# Patient Record
Sex: MALE | Race: WHITE | Employment: UNEMPLOYED | URBAN - METROPOLITAN AREA
[De-identification: names, ages, dates, MRNs, and addresses within clinical notes are randomized per-mention and may not be internally consistent; named-entity substitution may affect disease eponyms.]

---

## 2017-09-24 ENCOUNTER — APPOINTMENT (OUTPATIENT)
Dept: GENERAL RADIOLOGY | Age: 22
End: 2017-09-24
Payer: MEDICARE

## 2017-09-24 ENCOUNTER — HOSPITAL ENCOUNTER (EMERGENCY)
Age: 22
Discharge: HOME | End: 2017-09-24
Attending: EMERGENCY MEDICINE
Payer: MEDICARE

## 2017-09-24 VITALS
TEMPERATURE: 98.1 F | RESPIRATION RATE: 16 BRPM | HEIGHT: 70 IN | HEART RATE: 104 BPM | OXYGEN SATURATION: 97 % | DIASTOLIC BLOOD PRESSURE: 73 MMHG | WEIGHT: 230 LBS | BODY MASS INDEX: 32.93 KG/M2 | SYSTOLIC BLOOD PRESSURE: 127 MMHG
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
## 2017-09-24 PROCEDURE — 6370000000 HC RX 637 (ALT 250 FOR IP): Performed by: EMERGENCY MEDICINE

## 2017-09-24 PROCEDURE — 99284 EMERGENCY DEPT VISIT MOD MDM: CPT

## 2017-09-24 PROCEDURE — 72040 X-RAY EXAM NECK SPINE 2-3 VW: CPT

## 2017-09-24 PROCEDURE — 72100 X-RAY EXAM L-S SPINE 2/3 VWS: CPT

## 2017-09-24 PROCEDURE — 72072 X-RAY EXAM THORAC SPINE 3VWS: CPT

## 2017-09-24 RX ORDER — IBUPROFEN 600 MG/1
600 TABLET ORAL ONCE
Status: COMPLETED | OUTPATIENT
Start: 2017-09-24 | End: 2017-09-24

## 2017-09-24 RX ADMIN — IBUPROFEN 600 MG: 600 TABLET, FILM COATED ORAL at 03:34

## 2017-09-24 ASSESSMENT — PAIN SCALES - GENERAL
PAINLEVEL_OUTOF10: 7
PAINLEVEL_OUTOF10: 8

## 2017-09-24 ASSESSMENT — PAIN DESCRIPTION - DESCRIPTORS: DESCRIPTORS: ACHING

## 2017-09-24 ASSESSMENT — ENCOUNTER SYMPTOMS
SHORTNESS OF BREATH: 0
RESPIRATORY NEGATIVE: 1
ABDOMINAL PAIN: 0
COUGH: 0
EYES NEGATIVE: 1
BACK PAIN: 1
GASTROINTESTINAL NEGATIVE: 1

## 2017-09-24 ASSESSMENT — PAIN DESCRIPTION - FREQUENCY: FREQUENCY: CONTINUOUS

## 2017-09-24 ASSESSMENT — PAIN DESCRIPTION - LOCATION: LOCATION: BACK;HEAD

## 2017-10-31 ENCOUNTER — HOSPITAL ENCOUNTER (OUTPATIENT)
Age: 22
Discharge: HOME | End: 2017-10-31
Payer: MEDICARE

## 2017-10-31 LAB
ABSOLUTE EOS #: 0.2 K/UL (ref 0–0.44)
ABSOLUTE IMMATURE GRANULOCYTE: <0.03 K/UL (ref 0–0.3)
ABSOLUTE LYMPH #: 1.7 K/UL (ref 1.1–3.7)
ABSOLUTE MONO #: 0.67 K/UL (ref 0.1–1.2)
ALBUMIN SERPL-MCNC: 4.9 G/DL (ref 3.5–5.2)
ALBUMIN/GLOBULIN RATIO: 1.4 (ref 1–2.5)
ALP BLD-CCNC: 69 U/L (ref 40–129)
ALT SERPL-CCNC: 38 U/L (ref 5–41)
ANION GAP SERPL CALCULATED.3IONS-SCNC: 14 MMOL/L (ref 9–17)
AST SERPL-CCNC: 25 U/L
BASOPHILS # BLD: 1 % (ref 0–2)
BASOPHILS ABSOLUTE: 0.05 K/UL (ref 0–0.2)
BILIRUB SERPL-MCNC: 0.45 MG/DL (ref 0.3–1.2)
BUN BLDV-MCNC: 11 MG/DL (ref 6–20)
BUN/CREAT BLD: ABNORMAL (ref 9–20)
CALCIUM SERPL-MCNC: 9.9 MG/DL (ref 8.6–10.4)
CHLORIDE BLD-SCNC: 102 MMOL/L (ref 98–107)
CHOLESTEROL/HDL RATIO: 4.2
CHOLESTEROL: 192 MG/DL
CO2: 26 MMOL/L (ref 20–31)
CREAT SERPL-MCNC: 0.74 MG/DL (ref 0.7–1.2)
DIFFERENTIAL TYPE: ABNORMAL
EOSINOPHILS RELATIVE PERCENT: 2 % (ref 1–4)
GFR AFRICAN AMERICAN: >60 ML/MIN
GFR NON-AFRICAN AMERICAN: >60 ML/MIN
GFR SERPL CREATININE-BSD FRML MDRD: ABNORMAL ML/MIN/{1.73_M2}
GFR SERPL CREATININE-BSD FRML MDRD: ABNORMAL ML/MIN/{1.73_M2}
GLUCOSE BLD-MCNC: 102 MG/DL (ref 70–99)
HAV IGM SER IA-ACNC: NONREACTIVE
HCT VFR BLD CALC: 50.6 % (ref 40.7–50.3)
HDLC SERPL-MCNC: 46 MG/DL
HEMOGLOBIN: 16.3 G/DL (ref 13–17)
HEPATITIS B CORE IGM ANTIBODY: NONREACTIVE
HEPATITIS B SURFACE ANTIGEN: NONREACTIVE
HEPATITIS C ANTIBODY: REACTIVE
HIV AG/AB: NONREACTIVE
IMMATURE GRANULOCYTES: 0 %
LDL CHOLESTEROL: 116 MG/DL (ref 0–130)
LYMPHOCYTES # BLD: 20 % (ref 24–43)
MCH RBC QN AUTO: 29.4 PG (ref 25.2–33.5)
MCHC RBC AUTO-ENTMCNC: 32.2 G/DL (ref 29.9–34.7)
MCV RBC AUTO: 91.2 FL (ref 82.6–102.9)
MONOCYTES # BLD: 8 % (ref 3–12)
PDW BLD-RTO: 13.3 % (ref 11.8–14.4)
PLATELET # BLD: 216 K/UL (ref 138–453)
PLATELET ESTIMATE: ABNORMAL
PMV BLD AUTO: 10.3 FL (ref 8.1–13.5)
POTASSIUM SERPL-SCNC: 4.5 MMOL/L (ref 3.7–5.3)
RBC # BLD: 5.55 M/UL (ref 4.21–5.77)
RBC # BLD: ABNORMAL 10*6/UL
SEG NEUTROPHILS: 69 % (ref 36–65)
SEGMENTED NEUTROPHILS ABSOLUTE COUNT: 5.92 K/UL (ref 1.5–8.1)
SODIUM BLD-SCNC: 142 MMOL/L (ref 135–144)
T3 FREE: 2.94 PG/ML (ref 2.02–4.43)
THYROXINE, FREE: 0.99 NG/DL (ref 0.93–1.7)
TOTAL PROTEIN: 8.3 G/DL (ref 6.4–8.3)
TRIGL SERPL-MCNC: 148 MG/DL
TSH SERPL DL<=0.05 MIU/L-ACNC: 0.59 MIU/L (ref 0.3–5)
VLDLC SERPL CALC-MCNC: NORMAL MG/DL (ref 1–30)
WBC # BLD: 8.6 K/UL (ref 3.5–11.3)
WBC # BLD: ABNORMAL 10*3/UL

## 2017-10-31 PROCEDURE — 36415 COLL VENOUS BLD VENIPUNCTURE: CPT

## 2017-10-31 PROCEDURE — 87389 HIV-1 AG W/HIV-1&-2 AB AG IA: CPT

## 2017-10-31 PROCEDURE — 80061 LIPID PANEL: CPT

## 2017-10-31 PROCEDURE — 84481 FREE ASSAY (FT-3): CPT

## 2017-10-31 PROCEDURE — 84439 ASSAY OF FREE THYROXINE: CPT

## 2017-10-31 PROCEDURE — 80053 COMPREHEN METABOLIC PANEL: CPT

## 2017-10-31 PROCEDURE — 85025 COMPLETE CBC W/AUTO DIFF WBC: CPT

## 2017-10-31 PROCEDURE — 84443 ASSAY THYROID STIM HORMONE: CPT

## 2017-10-31 PROCEDURE — 80074 ACUTE HEPATITIS PANEL: CPT

## 2018-02-16 ENCOUNTER — HOSPITAL ENCOUNTER (OUTPATIENT)
Age: 23
Setting detail: SPECIMEN
Discharge: HOME | End: 2018-02-16
Payer: MEDICARE

## 2018-02-16 LAB
ABSOLUTE EOS #: 0.21 K/UL (ref 0–0.44)
ABSOLUTE IMMATURE GRANULOCYTE: <0.03 K/UL (ref 0–0.3)
ABSOLUTE LYMPH #: 1.32 K/UL (ref 1.1–3.7)
ABSOLUTE MONO #: 0.59 K/UL (ref 0.1–1.2)
ALBUMIN SERPL-MCNC: 4.4 G/DL (ref 3.5–5.2)
ALBUMIN/GLOBULIN RATIO: 1.7 (ref 1–2.5)
ALP BLD-CCNC: 67 U/L (ref 40–129)
ALT SERPL-CCNC: 16 U/L (ref 5–41)
ANION GAP SERPL CALCULATED.3IONS-SCNC: 12 MMOL/L (ref 9–17)
AST SERPL-CCNC: 19 U/L
BASOPHILS # BLD: 1 % (ref 0–2)
BASOPHILS ABSOLUTE: 0.05 K/UL (ref 0–0.2)
BILIRUB SERPL-MCNC: 0.24 MG/DL (ref 0.3–1.2)
BILIRUBIN DIRECT: <0.08 MG/DL
BILIRUBIN, INDIRECT: ABNORMAL MG/DL (ref 0–1)
BUN BLDV-MCNC: 12 MG/DL (ref 6–20)
CALCIUM SERPL-MCNC: 9.5 MG/DL (ref 8.6–10.4)
CHLORIDE BLD-SCNC: 102 MMOL/L (ref 98–107)
CHOLESTEROL/HDL RATIO: 3.4
CHOLESTEROL: 136 MG/DL
CO2: 26 MMOL/L (ref 20–31)
CREAT SERPL-MCNC: 0.82 MG/DL (ref 0.7–1.2)
DIFFERENTIAL TYPE: ABNORMAL
EOSINOPHILS RELATIVE PERCENT: 3 % (ref 1–4)
GFR AFRICAN AMERICAN: >60 ML/MIN
GFR NON-AFRICAN AMERICAN: >60 ML/MIN
GFR SERPL CREATININE-BSD FRML MDRD: ABNORMAL ML/MIN/{1.73_M2}
GFR SERPL CREATININE-BSD FRML MDRD: ABNORMAL ML/MIN/{1.73_M2}
GLUCOSE BLD-MCNC: 74 MG/DL (ref 70–99)
HAV IGM SER IA-ACNC: NONREACTIVE
HCT VFR BLD CALC: 44.7 % (ref 40.7–50.3)
HDLC SERPL-MCNC: 40 MG/DL
HEMOGLOBIN: 14 G/DL (ref 13–17)
HEPATITIS B CORE IGM ANTIBODY: NONREACTIVE
HEPATITIS B SURFACE ANTIGEN: NONREACTIVE
HEPATITIS C ANTIBODY: REACTIVE
HIV AG/AB: NONREACTIVE
IMMATURE GRANULOCYTES: 0 %
LDL CHOLESTEROL: 80 MG/DL (ref 0–130)
LYMPHOCYTES # BLD: 18 % (ref 24–43)
MCH RBC QN AUTO: 28.5 PG (ref 25.2–33.5)
MCHC RBC AUTO-ENTMCNC: 31.3 G/DL (ref 28.4–34.8)
MCV RBC AUTO: 91 FL (ref 82.6–102.9)
MONOCYTES # BLD: 8 % (ref 3–12)
NRBC AUTOMATED: 0 PER 100 WBC
PDW BLD-RTO: 14.4 % (ref 11.8–14.4)
PLATELET # BLD: 222 K/UL (ref 138–453)
PLATELET ESTIMATE: ABNORMAL
PMV BLD AUTO: 11 FL (ref 8.1–13.5)
POTASSIUM SERPL-SCNC: 5.5 MMOL/L (ref 3.7–5.3)
RBC # BLD: 4.91 M/UL (ref 4.21–5.77)
RBC # BLD: ABNORMAL 10*6/UL
SEG NEUTROPHILS: 70 % (ref 36–65)
SEGMENTED NEUTROPHILS ABSOLUTE COUNT: 5.36 K/UL (ref 1.5–8.1)
SODIUM BLD-SCNC: 140 MMOL/L (ref 135–144)
T3 FREE: 3.82 PG/ML (ref 2.02–4.43)
THYROXINE, FREE: 1.06 NG/DL (ref 0.93–1.7)
TOTAL PROTEIN: 7 G/DL (ref 6.4–8.3)
TRIGL SERPL-MCNC: 82 MG/DL
TSH SERPL DL<=0.05 MIU/L-ACNC: 0.52 MIU/L (ref 0.3–5)
VLDLC SERPL CALC-MCNC: ABNORMAL MG/DL (ref 1–30)
WBC # BLD: 7.5 K/UL (ref 3.5–11.3)
WBC # BLD: ABNORMAL 10*3/UL

## 2018-02-19 LAB
QUANTIFERON (R) TB GOLD (INCUBATED): NEGATIVE
QUANTIFERON MITOGEN: 8.55 IU/ML
QUANTIFERON NIL: 0.06 IU/ML
QUANTIFERON TB AG MINUS NIL: 0.12 IU/ML (ref 0–0.34)

## 2018-03-01 ENCOUNTER — HOSPITAL ENCOUNTER (OUTPATIENT)
Age: 23
Setting detail: SPECIMEN
Discharge: HOME | End: 2018-03-01
Payer: MEDICARE

## 2018-03-02 LAB
DIRECT EXAM: NORMAL
Lab: NORMAL
SPECIMEN DESCRIPTION: NORMAL
STATUS: NORMAL

## 2018-05-17 ENCOUNTER — HOSPITAL ENCOUNTER (OUTPATIENT)
Age: 23
Setting detail: SPECIMEN
Discharge: HOME | End: 2018-05-17
Payer: MEDICARE

## 2018-05-17 LAB
ABSOLUTE EOS #: 0.3 K/UL (ref 0–0.44)
ABSOLUTE IMMATURE GRANULOCYTE: <0.03 K/UL (ref 0–0.3)
ABSOLUTE LYMPH #: 2.35 K/UL (ref 1.1–3.7)
ABSOLUTE MONO #: 0.49 K/UL (ref 0.1–1.2)
ALBUMIN SERPL-MCNC: 4 G/DL (ref 3.5–5.2)
ALBUMIN/GLOBULIN RATIO: 1.4 (ref 1–2.5)
ALP BLD-CCNC: 56 U/L (ref 40–129)
ALT SERPL-CCNC: 46 U/L (ref 5–41)
ANION GAP SERPL CALCULATED.3IONS-SCNC: 12 MMOL/L (ref 9–17)
AST SERPL-CCNC: 43 U/L
BASOPHILS # BLD: 1 % (ref 0–2)
BASOPHILS ABSOLUTE: 0.03 K/UL (ref 0–0.2)
BILIRUB SERPL-MCNC: 0.6 MG/DL (ref 0.3–1.2)
BILIRUBIN DIRECT: 0.2 MG/DL
BILIRUBIN, INDIRECT: 0.4 MG/DL (ref 0–1)
BUN BLDV-MCNC: 10 MG/DL (ref 6–20)
CALCIUM SERPL-MCNC: 8.8 MG/DL (ref 8.6–10.4)
CHLORIDE BLD-SCNC: 104 MMOL/L (ref 98–107)
CHOLESTEROL/HDL RATIO: 3.6
CHOLESTEROL: 105 MG/DL
CO2: 25 MMOL/L (ref 20–31)
CREAT SERPL-MCNC: 0.91 MG/DL (ref 0.7–1.2)
DIFFERENTIAL TYPE: ABNORMAL
EOSINOPHILS RELATIVE PERCENT: 5 % (ref 1–4)
GFR AFRICAN AMERICAN: >60 ML/MIN
GFR NON-AFRICAN AMERICAN: >60 ML/MIN
GFR SERPL CREATININE-BSD FRML MDRD: ABNORMAL ML/MIN/{1.73_M2}
GFR SERPL CREATININE-BSD FRML MDRD: ABNORMAL ML/MIN/{1.73_M2}
GLUCOSE BLD-MCNC: 86 MG/DL (ref 70–99)
HAV IGM SER IA-ACNC: NONREACTIVE
HCT VFR BLD CALC: 42.1 % (ref 40.7–50.3)
HDLC SERPL-MCNC: 29 MG/DL
HEMOGLOBIN: 13.6 G/DL (ref 13–17)
HEPATITIS B CORE IGM ANTIBODY: NONREACTIVE
HEPATITIS B SURFACE ANTIGEN: NONREACTIVE
HEPATITIS C ANTIBODY: REACTIVE
HIV AG/AB: NONREACTIVE
IMMATURE GRANULOCYTES: 0 %
LDL CHOLESTEROL: 60 MG/DL (ref 0–130)
LYMPHOCYTES # BLD: 41 % (ref 24–43)
MCH RBC QN AUTO: 28.5 PG (ref 25.2–33.5)
MCHC RBC AUTO-ENTMCNC: 32.3 G/DL (ref 28.4–34.8)
MCV RBC AUTO: 88.1 FL (ref 82.6–102.9)
MONOCYTES # BLD: 9 % (ref 3–12)
NRBC AUTOMATED: 0 PER 100 WBC
PDW BLD-RTO: 13.8 % (ref 11.8–14.4)
PLATELET # BLD: 191 K/UL (ref 138–453)
PLATELET ESTIMATE: ABNORMAL
PMV BLD AUTO: 10.6 FL (ref 8.1–13.5)
POTASSIUM SERPL-SCNC: 3.9 MMOL/L (ref 3.7–5.3)
RBC # BLD: 4.78 M/UL (ref 4.21–5.77)
RBC # BLD: ABNORMAL 10*6/UL
SEG NEUTROPHILS: 44 % (ref 36–65)
SEGMENTED NEUTROPHILS ABSOLUTE COUNT: 2.51 K/UL (ref 1.5–8.1)
SODIUM BLD-SCNC: 141 MMOL/L (ref 135–144)
T3 FREE: 4.05 PG/ML (ref 2.02–4.43)
THYROXINE, FREE: 1.33 NG/DL (ref 0.93–1.7)
TOTAL PROTEIN: 6.8 G/DL (ref 6.4–8.3)
TRIGL SERPL-MCNC: 82 MG/DL
TSH SERPL DL<=0.05 MIU/L-ACNC: 0.39 MIU/L (ref 0.3–5)
VLDLC SERPL CALC-MCNC: ABNORMAL MG/DL (ref 1–30)
WBC # BLD: 5.7 K/UL (ref 3.5–11.3)
WBC # BLD: ABNORMAL 10*3/UL

## 2018-05-19 LAB
QUANTIFERON (R) TB GOLD (INCUBATED): NEGATIVE
QUANTIFERON MITOGEN: >10 IU/ML
QUANTIFERON NIL: 0.02 IU/ML
QUANTIFERON TB AG MINUS NIL: 0 IU/ML (ref 0–0.34)

## 2018-06-05 ENCOUNTER — HOSPITAL ENCOUNTER (EMERGENCY)
Age: 23
Discharge: HOME | End: 2018-06-05
Attending: EMERGENCY MEDICINE
Payer: MEDICARE

## 2018-06-05 VITALS
BODY MASS INDEX: 32.93 KG/M2 | HEART RATE: 98 BPM | OXYGEN SATURATION: 98 % | SYSTOLIC BLOOD PRESSURE: 116 MMHG | TEMPERATURE: 98.6 F | WEIGHT: 230 LBS | RESPIRATION RATE: 11 BRPM | DIASTOLIC BLOOD PRESSURE: 69 MMHG | HEIGHT: 70 IN

## 2018-06-05 DIAGNOSIS — T40.1X1A ACCIDENTAL OVERDOSE OF HEROIN, INITIAL ENCOUNTER (HCC): Primary | ICD-10-CM

## 2018-06-05 LAB
EKG ATRIAL RATE: 115 BPM
EKG P AXIS: 37 DEGREES
EKG P-R INTERVAL: 146 MS
EKG Q-T INTERVAL: 320 MS
EKG QRS DURATION: 90 MS
EKG QTC CALCULATION (BAZETT): 442 MS
EKG R AXIS: 51 DEGREES
EKG T AXIS: 43 DEGREES
EKG VENTRICULAR RATE: 115 BPM

## 2018-06-05 PROCEDURE — 99283 EMERGENCY DEPT VISIT LOW MDM: CPT

## 2018-06-05 PROCEDURE — 96374 THER/PROPH/DIAG INJ IV PUSH: CPT

## 2018-06-05 PROCEDURE — 6360000002 HC RX W HCPCS: Performed by: PHYSICIAN ASSISTANT

## 2018-06-05 PROCEDURE — 6360000002 HC RX W HCPCS

## 2018-06-05 PROCEDURE — 93005 ELECTROCARDIOGRAM TRACING: CPT

## 2018-06-05 PROCEDURE — 96375 TX/PRO/DX INJ NEW DRUG ADDON: CPT

## 2018-06-05 RX ORDER — NALOXONE HYDROCHLORIDE 1 MG/ML
INJECTION INTRAMUSCULAR; INTRAVENOUS; SUBCUTANEOUS
Status: COMPLETED
Start: 2018-06-05 | End: 2018-06-05

## 2018-06-05 RX ORDER — QUETIAPINE FUMARATE 200 MG/1
200 TABLET, FILM COATED ORAL NIGHTLY
COMMUNITY
End: 2021-03-03

## 2018-06-05 RX ORDER — KETOROLAC TROMETHAMINE 30 MG/ML
30 INJECTION, SOLUTION INTRAMUSCULAR; INTRAVENOUS ONCE
Status: COMPLETED | OUTPATIENT
Start: 2018-06-05 | End: 2018-06-05

## 2018-06-05 RX ORDER — NALOXONE HYDROCHLORIDE 1 MG/ML
2 INJECTION INTRAMUSCULAR; INTRAVENOUS; SUBCUTANEOUS ONCE
Status: DISCONTINUED | OUTPATIENT
Start: 2018-06-05 | End: 2018-06-06 | Stop reason: HOSPADM

## 2018-06-05 RX ADMIN — NALOXONE HYDROCHLORIDE 2 MG: 1 INJECTION PARENTERAL at 20:15

## 2018-06-05 RX ADMIN — KETOROLAC TROMETHAMINE 30 MG: 30 INJECTION, SOLUTION INTRAMUSCULAR at 20:39

## 2018-06-05 ASSESSMENT — PAIN SCALES - GENERAL: PAINLEVEL_OUTOF10: 6

## 2018-06-06 ASSESSMENT — ENCOUNTER SYMPTOMS
SHORTNESS OF BREATH: 0
BACK PAIN: 0
EYE DISCHARGE: 0
NAUSEA: 0
VOMITING: 0
COUGH: 0
RHINORRHEA: 0
EYE PAIN: 0
WHEEZING: 0
EYE ITCHING: 0
SORE THROAT: 0

## 2018-06-13 ENCOUNTER — HOSPITAL ENCOUNTER (EMERGENCY)
Age: 23
Discharge: HOME | End: 2018-06-13
Attending: EMERGENCY MEDICINE
Payer: MEDICARE

## 2018-06-13 VITALS
SYSTOLIC BLOOD PRESSURE: 145 MMHG | TEMPERATURE: 97.2 F | HEART RATE: 65 BPM | OXYGEN SATURATION: 100 % | RESPIRATION RATE: 18 BRPM | DIASTOLIC BLOOD PRESSURE: 91 MMHG

## 2018-06-13 DIAGNOSIS — J02.9 ACUTE PHARYNGITIS, UNSPECIFIED ETIOLOGY: ICD-10-CM

## 2018-06-13 DIAGNOSIS — J06.9 VIRAL URI WITH COUGH: Primary | ICD-10-CM

## 2018-06-13 DIAGNOSIS — H66.003 ACUTE SUPPURATIVE OTITIS MEDIA OF BOTH EARS WITHOUT SPONTANEOUS RUPTURE OF TYMPANIC MEMBRANES, RECURRENCE NOT SPECIFIED: ICD-10-CM

## 2018-06-13 DIAGNOSIS — H10.33 ACUTE CONJUNCTIVITIS OF BOTH EYES, UNSPECIFIED ACUTE CONJUNCTIVITIS TYPE: ICD-10-CM

## 2018-06-13 PROCEDURE — 6370000000 HC RX 637 (ALT 250 FOR IP): Performed by: EMERGENCY MEDICINE

## 2018-06-13 PROCEDURE — 99282 EMERGENCY DEPT VISIT SF MDM: CPT

## 2018-06-13 RX ORDER — SULFACETAMIDE SODIUM 100 MG/ML
2 SOLUTION/ DROPS OPHTHALMIC 4 TIMES DAILY
Status: DISCONTINUED | OUTPATIENT
Start: 2018-06-13 | End: 2018-06-13 | Stop reason: HOSPADM

## 2018-06-13 RX ORDER — IBUPROFEN 400 MG/1
400 TABLET ORAL ONCE
Status: COMPLETED | OUTPATIENT
Start: 2018-06-13 | End: 2018-06-13

## 2018-06-13 RX ORDER — SULFACETAMIDE SODIUM 100 MG/ML
2 SOLUTION/ DROPS OPHTHALMIC 4 TIMES DAILY
Qty: 2 BOTTLE | Refills: 0 | Status: SHIPPED | OUTPATIENT
Start: 2018-06-13 | End: 2018-06-20

## 2018-06-13 RX ORDER — DIPHENHYDRAMINE HCL 25 MG
25 TABLET ORAL ONCE
Status: COMPLETED | OUTPATIENT
Start: 2018-06-13 | End: 2018-06-13

## 2018-06-13 RX ORDER — CETIRIZINE HYDROCHLORIDE 10 MG/1
10 TABLET ORAL DAILY
Qty: 15 TABLET | Refills: 0 | Status: ON HOLD | OUTPATIENT
Start: 2018-06-13 | End: 2020-01-09 | Stop reason: ALTCHOICE

## 2018-06-13 RX ORDER — AMOXICILLIN AND CLAVULANATE POTASSIUM 875; 125 MG/1; MG/1
1 TABLET, FILM COATED ORAL 2 TIMES DAILY
Qty: 14 TABLET | Refills: 0 | Status: SHIPPED | OUTPATIENT
Start: 2018-06-13 | End: 2018-06-20

## 2018-06-13 RX ORDER — AMOXICILLIN AND CLAVULANATE POTASSIUM 875; 125 MG/1; MG/1
1 TABLET, FILM COATED ORAL EVERY 12 HOURS SCHEDULED
Status: DISCONTINUED | OUTPATIENT
Start: 2018-06-13 | End: 2018-06-13 | Stop reason: HOSPADM

## 2018-06-13 RX ORDER — IBUPROFEN 400 MG/1
400 TABLET ORAL EVERY 8 HOURS PRN
Qty: 20 TABLET | Refills: 0 | Status: SHIPPED | OUTPATIENT
Start: 2018-06-13 | End: 2021-03-03

## 2018-06-13 RX ADMIN — DIPHENHYDRAMINE HCL 25 MG: 25 TABLET ORAL at 01:22

## 2018-06-13 RX ADMIN — SULFACETAMIDE SODIUM 2 DROP: 100 SOLUTION OPHTHALMIC at 01:22

## 2018-06-13 RX ADMIN — IBUPROFEN 400 MG: 400 TABLET, FILM COATED ORAL at 01:22

## 2018-06-13 ASSESSMENT — VISUAL ACUITY
OU: 20/20
OS: 20/20
OD: 20/15

## 2018-06-13 ASSESSMENT — ENCOUNTER SYMPTOMS
BLOOD IN STOOL: 0
PHOTOPHOBIA: 0
COUGH: 1
DIARRHEA: 0
RHINORRHEA: 1
SORE THROAT: 1
VOMITING: 0
SHORTNESS OF BREATH: 0
ABDOMINAL PAIN: 0
EYE REDNESS: 1
SINUS PRESSURE: 0
EYE DISCHARGE: 1
NAUSEA: 0
EYE PAIN: 1

## 2019-07-27 ENCOUNTER — HOSPITAL ENCOUNTER (OUTPATIENT)
Age: 24
Setting detail: SPECIMEN
Discharge: HOME | End: 2019-07-27
Payer: MEDICARE

## 2019-07-27 LAB
ABSOLUTE EOS #: 0.23 K/UL (ref 0–0.44)
ABSOLUTE IMMATURE GRANULOCYTE: <0.03 K/UL (ref 0–0.3)
ABSOLUTE LYMPH #: 1.65 K/UL (ref 1.1–3.7)
ABSOLUTE MONO #: 0.49 K/UL (ref 0.1–1.2)
ALBUMIN SERPL-MCNC: 4.1 G/DL (ref 3.5–5.2)
ALBUMIN/GLOBULIN RATIO: 1.4 (ref 1–2.5)
ALP BLD-CCNC: 73 U/L (ref 40–129)
ALT SERPL-CCNC: 26 U/L (ref 5–41)
ANION GAP SERPL CALCULATED.3IONS-SCNC: 11 MMOL/L (ref 9–17)
AST SERPL-CCNC: 21 U/L
BASOPHILS # BLD: 0 % (ref 0–2)
BASOPHILS ABSOLUTE: <0.03 K/UL (ref 0–0.2)
BILIRUB SERPL-MCNC: 0.2 MG/DL (ref 0.3–1.2)
BUN BLDV-MCNC: 12 MG/DL (ref 6–20)
BUN/CREAT BLD: ABNORMAL (ref 9–20)
CALCIUM SERPL-MCNC: 9.5 MG/DL (ref 8.6–10.4)
CHLORIDE BLD-SCNC: 107 MMOL/L (ref 98–107)
CO2: 25 MMOL/L (ref 20–31)
CREAT SERPL-MCNC: 0.95 MG/DL (ref 0.7–1.2)
DIFFERENTIAL TYPE: NORMAL
EOSINOPHILS RELATIVE PERCENT: 4 % (ref 1–4)
GFR AFRICAN AMERICAN: >60 ML/MIN
GFR NON-AFRICAN AMERICAN: >60 ML/MIN
GFR SERPL CREATININE-BSD FRML MDRD: ABNORMAL ML/MIN/{1.73_M2}
GFR SERPL CREATININE-BSD FRML MDRD: ABNORMAL ML/MIN/{1.73_M2}
GLUCOSE BLD-MCNC: 102 MG/DL (ref 70–99)
HAV IGM SER IA-ACNC: NONREACTIVE
HCT VFR BLD CALC: 43.4 % (ref 40.7–50.3)
HEMOGLOBIN: 14.1 G/DL (ref 13–17)
HEPATITIS B CORE IGM ANTIBODY: NONREACTIVE
HEPATITIS B SURFACE ANTIGEN: NONREACTIVE
HEPATITIS C ANTIBODY: REACTIVE
HIV AG/AB: NONREACTIVE
IMMATURE GRANULOCYTES: 0 %
LYMPHOCYTES # BLD: 27 % (ref 24–43)
MCH RBC QN AUTO: 28.9 PG (ref 25.2–33.5)
MCHC RBC AUTO-ENTMCNC: 32.5 G/DL (ref 28.4–34.8)
MCV RBC AUTO: 88.9 FL (ref 82.6–102.9)
MONOCYTES # BLD: 8 % (ref 3–12)
NRBC AUTOMATED: 0 PER 100 WBC
PDW BLD-RTO: 13.2 % (ref 11.8–14.4)
PLATELET # BLD: 188 K/UL (ref 138–453)
PLATELET ESTIMATE: NORMAL
PMV BLD AUTO: 11.1 FL (ref 8.1–13.5)
POTASSIUM SERPL-SCNC: 4.8 MMOL/L (ref 3.7–5.3)
RBC # BLD: 4.88 M/UL (ref 4.21–5.77)
RBC # BLD: NORMAL 10*6/UL
SEG NEUTROPHILS: 61 % (ref 36–65)
SEGMENTED NEUTROPHILS ABSOLUTE COUNT: 3.63 K/UL (ref 1.5–8.1)
SODIUM BLD-SCNC: 143 MMOL/L (ref 135–144)
TOTAL PROTEIN: 7 G/DL (ref 6.4–8.3)
WBC # BLD: 6 K/UL (ref 3.5–11.3)
WBC # BLD: NORMAL 10*3/UL

## 2019-07-28 LAB
DIRECT EXAM: NORMAL
Lab: NORMAL
SPECIMEN DESCRIPTION: NORMAL

## 2019-07-30 LAB
QUANTI TB GOLD PLUS: NEGATIVE
QUANTI TB1 MINUS NIL: 0 IU/ML (ref 0–0.34)
QUANTI TB2 MINUS NIL: 0 IU/ML (ref 0–0.34)
QUANTIFERON MITOGEN: 3.19 IU/ML
QUANTIFERON NIL: 0.05 IU/ML

## 2020-01-08 ENCOUNTER — ANESTHESIA EVENT (OUTPATIENT)
Dept: OPERATING ROOM | Age: 25
End: 2020-01-08
Payer: MEDICARE

## 2020-01-08 ENCOUNTER — ANESTHESIA (OUTPATIENT)
Dept: OPERATING ROOM | Age: 25
End: 2020-01-08
Payer: MEDICARE

## 2020-01-08 ENCOUNTER — HOSPITAL ENCOUNTER (OUTPATIENT)
Age: 25
Setting detail: OBSERVATION
Discharge: HOME | End: 2020-01-09
Attending: EMERGENCY MEDICINE | Admitting: SURGERY
Payer: MEDICARE

## 2020-01-08 ENCOUNTER — APPOINTMENT (OUTPATIENT)
Dept: CT IMAGING | Age: 25
End: 2020-01-08
Payer: MEDICARE

## 2020-01-08 LAB
ABSOLUTE EOS #: 0.1 K/UL (ref 0–0.44)
ABSOLUTE IMMATURE GRANULOCYTE: 0.04 K/UL (ref 0–0.3)
ABSOLUTE LYMPH #: 1.59 K/UL (ref 1.1–3.7)
ABSOLUTE MONO #: 0.57 K/UL (ref 0.1–1.2)
ANION GAP SERPL CALCULATED.3IONS-SCNC: 13 MMOL/L (ref 9–17)
BASOPHILS # BLD: 0 % (ref 0–2)
BASOPHILS ABSOLUTE: 0.03 K/UL (ref 0–0.2)
BUN BLDV-MCNC: 12 MG/DL (ref 6–20)
BUN/CREAT BLD: NORMAL (ref 9–20)
CALCIUM SERPL-MCNC: 9.6 MG/DL (ref 8.6–10.4)
CHLORIDE BLD-SCNC: 98 MMOL/L (ref 98–107)
CO2: 25 MMOL/L (ref 20–31)
CREAT SERPL-MCNC: 0.86 MG/DL (ref 0.7–1.2)
DIFFERENTIAL TYPE: ABNORMAL
EOSINOPHILS RELATIVE PERCENT: 1 % (ref 1–4)
GFR AFRICAN AMERICAN: >60 ML/MIN
GFR NON-AFRICAN AMERICAN: >60 ML/MIN
GFR SERPL CREATININE-BSD FRML MDRD: NORMAL ML/MIN/{1.73_M2}
GFR SERPL CREATININE-BSD FRML MDRD: NORMAL ML/MIN/{1.73_M2}
GLUCOSE BLD-MCNC: 92 MG/DL (ref 70–99)
HCT VFR BLD CALC: 47.5 % (ref 40.7–50.3)
HEMOGLOBIN: 15.7 G/DL (ref 13–17)
IMMATURE GRANULOCYTES: 1 %
LYMPHOCYTES # BLD: 20 % (ref 24–43)
MCH RBC QN AUTO: 29.3 PG (ref 25.2–33.5)
MCHC RBC AUTO-ENTMCNC: 33.1 G/DL (ref 28.4–34.8)
MCV RBC AUTO: 88.6 FL (ref 82.6–102.9)
MONOCYTES # BLD: 7 % (ref 3–12)
NRBC AUTOMATED: 0 PER 100 WBC
PDW BLD-RTO: 13.4 % (ref 11.8–14.4)
PLATELET # BLD: 217 K/UL (ref 138–453)
PLATELET ESTIMATE: ABNORMAL
PMV BLD AUTO: 10.7 FL (ref 8.1–13.5)
POTASSIUM SERPL-SCNC: 4.8 MMOL/L (ref 3.7–5.3)
RBC # BLD: 5.36 M/UL (ref 4.21–5.77)
RBC # BLD: ABNORMAL 10*6/UL
SEG NEUTROPHILS: 71 % (ref 36–65)
SEGMENTED NEUTROPHILS ABSOLUTE COUNT: 5.7 K/UL (ref 1.5–8.1)
SODIUM BLD-SCNC: 136 MMOL/L (ref 135–144)
WBC # BLD: 8 K/UL (ref 3.5–11.3)
WBC # BLD: ABNORMAL 10*3/UL

## 2020-01-08 PROCEDURE — 2580000003 HC RX 258: Performed by: SURGERY

## 2020-01-08 PROCEDURE — 2580000003 HC RX 258: Performed by: STUDENT IN AN ORGANIZED HEALTH CARE EDUCATION/TRAINING PROGRAM

## 2020-01-08 PROCEDURE — 6360000002 HC RX W HCPCS: Performed by: NURSE ANESTHETIST, CERTIFIED REGISTERED

## 2020-01-08 PROCEDURE — 74177 CT ABD & PELVIS W/CONTRAST: CPT

## 2020-01-08 PROCEDURE — 2709999900 HC NON-CHARGEABLE SUPPLY: Performed by: SURGERY

## 2020-01-08 PROCEDURE — 3700000000 HC ANESTHESIA ATTENDED CARE: Performed by: SURGERY

## 2020-01-08 PROCEDURE — 96375 TX/PRO/DX INJ NEW DRUG ADDON: CPT

## 2020-01-08 PROCEDURE — 96365 THER/PROPH/DIAG IV INF INIT: CPT

## 2020-01-08 PROCEDURE — 6360000004 HC RX CONTRAST MEDICATION: Performed by: STUDENT IN AN ORGANIZED HEALTH CARE EDUCATION/TRAINING PROGRAM

## 2020-01-08 PROCEDURE — 6360000002 HC RX W HCPCS: Performed by: STUDENT IN AN ORGANIZED HEALTH CARE EDUCATION/TRAINING PROGRAM

## 2020-01-08 PROCEDURE — 3600000014 HC SURGERY LEVEL 4 ADDTL 15MIN: Performed by: SURGERY

## 2020-01-08 PROCEDURE — 85025 COMPLETE CBC W/AUTO DIFF WBC: CPT

## 2020-01-08 PROCEDURE — 2500000003 HC RX 250 WO HCPCS: Performed by: NURSE ANESTHETIST, CERTIFIED REGISTERED

## 2020-01-08 PROCEDURE — 99285 EMERGENCY DEPT VISIT HI MDM: CPT

## 2020-01-08 PROCEDURE — 2720000010 HC SURG SUPPLY STERILE: Performed by: SURGERY

## 2020-01-08 PROCEDURE — 3700000001 HC ADD 15 MINUTES (ANESTHESIA): Performed by: SURGERY

## 2020-01-08 PROCEDURE — 3600000004 HC SURGERY LEVEL 4 BASE: Performed by: SURGERY

## 2020-01-08 PROCEDURE — 7100000001 HC PACU RECOVERY - ADDTL 15 MIN: Performed by: SURGERY

## 2020-01-08 PROCEDURE — 7100000000 HC PACU RECOVERY - FIRST 15 MIN: Performed by: SURGERY

## 2020-01-08 PROCEDURE — 2580000003 HC RX 258: Performed by: NURSE ANESTHETIST, CERTIFIED REGISTERED

## 2020-01-08 PROCEDURE — 80048 BASIC METABOLIC PNL TOTAL CA: CPT

## 2020-01-08 PROCEDURE — 81003 URINALYSIS AUTO W/O SCOPE: CPT

## 2020-01-08 RX ORDER — FENTANYL CITRATE 50 UG/ML
25 INJECTION, SOLUTION INTRAMUSCULAR; INTRAVENOUS EVERY 5 MIN PRN
Status: DISCONTINUED | OUTPATIENT
Start: 2020-01-08 | End: 2020-01-09 | Stop reason: HOSPADM

## 2020-01-08 RX ORDER — FENTANYL CITRATE 50 UG/ML
INJECTION, SOLUTION INTRAMUSCULAR; INTRAVENOUS PRN
Status: DISCONTINUED | OUTPATIENT
Start: 2020-01-08 | End: 2020-01-09 | Stop reason: SDUPTHER

## 2020-01-08 RX ORDER — FENTANYL CITRATE 50 UG/ML
50 INJECTION, SOLUTION INTRAMUSCULAR; INTRAVENOUS EVERY 5 MIN PRN
Status: COMPLETED | OUTPATIENT
Start: 2020-01-08 | End: 2020-01-09

## 2020-01-08 RX ORDER — PROPOFOL 10 MG/ML
INJECTION, EMULSION INTRAVENOUS PRN
Status: DISCONTINUED | OUTPATIENT
Start: 2020-01-08 | End: 2020-01-09 | Stop reason: SDUPTHER

## 2020-01-08 RX ORDER — MORPHINE SULFATE 4 MG/ML
4 INJECTION, SOLUTION INTRAMUSCULAR; INTRAVENOUS ONCE
Status: COMPLETED | OUTPATIENT
Start: 2020-01-08 | End: 2020-01-08

## 2020-01-08 RX ORDER — LIDOCAINE HYDROCHLORIDE 10 MG/ML
INJECTION, SOLUTION EPIDURAL; INFILTRATION; INTRACAUDAL; PERINEURAL PRN
Status: DISCONTINUED | OUTPATIENT
Start: 2020-01-08 | End: 2020-01-09 | Stop reason: SDUPTHER

## 2020-01-08 RX ORDER — SODIUM CHLORIDE, SODIUM LACTATE, POTASSIUM CHLORIDE, CALCIUM CHLORIDE 600; 310; 30; 20 MG/100ML; MG/100ML; MG/100ML; MG/100ML
INJECTION, SOLUTION INTRAVENOUS CONTINUOUS PRN
Status: DISCONTINUED | OUTPATIENT
Start: 2020-01-08 | End: 2020-01-09 | Stop reason: SDUPTHER

## 2020-01-08 RX ORDER — MAGNESIUM HYDROXIDE 1200 MG/15ML
LIQUID ORAL CONTINUOUS PRN
Status: COMPLETED | OUTPATIENT
Start: 2020-01-08 | End: 2020-01-08

## 2020-01-08 RX ORDER — ROCURONIUM BROMIDE 10 MG/ML
INJECTION, SOLUTION INTRAVENOUS PRN
Status: DISCONTINUED | OUTPATIENT
Start: 2020-01-08 | End: 2020-01-09 | Stop reason: SDUPTHER

## 2020-01-08 RX ORDER — SUCCINYLCHOLINE CHLORIDE 20 MG/ML
INJECTION INTRAMUSCULAR; INTRAVENOUS PRN
Status: DISCONTINUED | OUTPATIENT
Start: 2020-01-08 | End: 2020-01-09 | Stop reason: SDUPTHER

## 2020-01-08 RX ORDER — DEXAMETHASONE SODIUM PHOSPHATE 10 MG/ML
INJECTION INTRAMUSCULAR; INTRAVENOUS PRN
Status: DISCONTINUED | OUTPATIENT
Start: 2020-01-08 | End: 2020-01-09 | Stop reason: SDUPTHER

## 2020-01-08 RX ADMIN — DEXAMETHASONE SODIUM PHOSPHATE 10 MG: 10 INJECTION INTRAMUSCULAR; INTRAVENOUS at 23:56

## 2020-01-08 RX ADMIN — SODIUM CHLORIDE, POTASSIUM CHLORIDE, SODIUM LACTATE AND CALCIUM CHLORIDE: 600; 310; 30; 20 INJECTION, SOLUTION INTRAVENOUS at 23:31

## 2020-01-08 RX ADMIN — PROPOFOL 200 MG: 10 INJECTION, EMULSION INTRAVENOUS at 23:34

## 2020-01-08 RX ADMIN — IOHEXOL 75 ML: 350 INJECTION, SOLUTION INTRAVENOUS at 21:24

## 2020-01-08 RX ADMIN — LIDOCAINE HYDROCHLORIDE 50 MG: 10 INJECTION, SOLUTION EPIDURAL; INFILTRATION; INTRACAUDAL; PERINEURAL at 23:34

## 2020-01-08 RX ADMIN — FENTANYL CITRATE 100 MCG: 50 INJECTION INTRAMUSCULAR; INTRAVENOUS at 23:34

## 2020-01-08 RX ADMIN — ROCURONIUM BROMIDE 30 MG: 10 INJECTION INTRAVENOUS at 23:37

## 2020-01-08 RX ADMIN — SUCCINYLCHOLINE CHLORIDE 100 MG: 20 INJECTION, SOLUTION INTRAMUSCULAR; INTRAVENOUS at 23:34

## 2020-01-08 RX ADMIN — PIPERACILLIN AND TAZOBACTAM 3.38 G: 3; .375 INJECTION, POWDER, FOR SOLUTION INTRAVENOUS at 22:14

## 2020-01-08 RX ADMIN — MORPHINE SULFATE 4 MG: 4 INJECTION INTRAVENOUS at 23:20

## 2020-01-08 ASSESSMENT — PULMONARY FUNCTION TESTS
PIF_VALUE: 0
PIF_VALUE: 17
PIF_VALUE: 15
PIF_VALUE: 21
PIF_VALUE: 16
PIF_VALUE: 14
PIF_VALUE: 16
PIF_VALUE: 1
PIF_VALUE: 1
PIF_VALUE: 16
PIF_VALUE: 15
PIF_VALUE: 13
PIF_VALUE: 2
PIF_VALUE: 16
PIF_VALUE: 16
PIF_VALUE: 1
PIF_VALUE: 17
PIF_VALUE: 16
PIF_VALUE: 4
PIF_VALUE: 16
PIF_VALUE: 16
PIF_VALUE: 15
PIF_VALUE: 13
PIF_VALUE: 1
PIF_VALUE: 14
PIF_VALUE: 15
PIF_VALUE: 18
PIF_VALUE: 15

## 2020-01-08 ASSESSMENT — ENCOUNTER SYMPTOMS
DIARRHEA: 0
CONSTIPATION: 0
ABDOMINAL PAIN: 1
RHINORRHEA: 0
SHORTNESS OF BREATH: 0
EYE REDNESS: 0
VOMITING: 0
COUGH: 0
NAUSEA: 1
BACK PAIN: 0
EYE ITCHING: 0

## 2020-01-08 ASSESSMENT — PAIN SCALES - GENERAL
PAINLEVEL_OUTOF10: 7
PAINLEVEL_OUTOF10: 7

## 2020-01-08 ASSESSMENT — PAIN DESCRIPTION - LOCATION: LOCATION: ABDOMEN

## 2020-01-08 ASSESSMENT — LIFESTYLE VARIABLES: SMOKING_STATUS: 1

## 2020-01-08 ASSESSMENT — PAIN DESCRIPTION - DESCRIPTORS: DESCRIPTORS: SHARP

## 2020-01-08 ASSESSMENT — PAIN DESCRIPTION - ORIENTATION: ORIENTATION: LOWER

## 2020-01-08 NOTE — LETTER
[facility]  [id number] [address]  Phone: [phone]    No name on file. January 9, 2020     Patient: Chucho Dhillon   YOB: 1995   Date of Visit: 1/8/2020       To Whom It May Concern: It is my medical opinion that Tripp Ca may return to light duty immediately with the following restrictions: lifting/carrying not to exceed 10 lbs. .    If you have any questions or concerns, please don't hesitate to call. Sincerely,        No name on file.

## 2020-01-08 NOTE — LETTER
[facility]  [address] [place] [address]  Phone: [phone]          January 9, 2020     Patient: Ashleigh Ansari   YOB: 1995   Date of Visit: 1/8/2020       To Whom It May Concern: It is my medical opinion that Frida Sutton requires opioid pain medication to be used in the acute post operative period. He had surgery on 1/8/2020. He was prescribed a 3 day supply of opioid pain medication. He was instructed to use the least amount possible. If you have any questions or concerns, please don't hesitate to call.       Sincerely,    Lianne Bauer, DO

## 2020-01-09 VITALS
OXYGEN SATURATION: 96 % | WEIGHT: 225 LBS | HEIGHT: 69 IN | BODY MASS INDEX: 33.33 KG/M2 | RESPIRATION RATE: 11 BRPM | SYSTOLIC BLOOD PRESSURE: 125 MMHG | DIASTOLIC BLOOD PRESSURE: 61 MMHG | TEMPERATURE: 98.4 F | HEART RATE: 75 BPM

## 2020-01-09 VITALS — SYSTOLIC BLOOD PRESSURE: 105 MMHG | TEMPERATURE: 98.8 F | OXYGEN SATURATION: 100 % | DIASTOLIC BLOOD PRESSURE: 63 MMHG

## 2020-01-09 PROBLEM — K37 APPENDICITIS: Status: RESOLVED | Noted: 2020-01-09 | Resolved: 2020-01-09

## 2020-01-09 PROBLEM — K37 APPENDICITIS: Status: ACTIVE | Noted: 2020-01-09

## 2020-01-09 LAB
BILIRUBIN URINE: NEGATIVE
COLOR: YELLOW
COMMENT UA: ABNORMAL
GLUCOSE URINE: NEGATIVE
KETONES, URINE: ABNORMAL
LEUKOCYTE ESTERASE, URINE: NEGATIVE
NITRITE, URINE: NEGATIVE
PH UA: 5 (ref 5–8)
PROTEIN UA: NEGATIVE
SPECIFIC GRAVITY UA: 1.09 (ref 1–1.03)
TURBIDITY: CLEAR
URINE HGB: NEGATIVE
UROBILINOGEN, URINE: NORMAL

## 2020-01-09 PROCEDURE — 2580000003 HC RX 258: Performed by: STUDENT IN AN ORGANIZED HEALTH CARE EDUCATION/TRAINING PROGRAM

## 2020-01-09 PROCEDURE — 6360000002 HC RX W HCPCS

## 2020-01-09 PROCEDURE — G0378 HOSPITAL OBSERVATION PER HR: HCPCS

## 2020-01-09 PROCEDURE — 6370000000 HC RX 637 (ALT 250 FOR IP): Performed by: STUDENT IN AN ORGANIZED HEALTH CARE EDUCATION/TRAINING PROGRAM

## 2020-01-09 PROCEDURE — 6360000002 HC RX W HCPCS: Performed by: STUDENT IN AN ORGANIZED HEALTH CARE EDUCATION/TRAINING PROGRAM

## 2020-01-09 PROCEDURE — 2500000003 HC RX 250 WO HCPCS: Performed by: SURGERY

## 2020-01-09 PROCEDURE — 88304 TISSUE EXAM BY PATHOLOGIST: CPT

## 2020-01-09 PROCEDURE — 6360000002 HC RX W HCPCS: Performed by: NURSE ANESTHETIST, CERTIFIED REGISTERED

## 2020-01-09 PROCEDURE — 2500000003 HC RX 250 WO HCPCS: Performed by: NURSE ANESTHETIST, CERTIFIED REGISTERED

## 2020-01-09 PROCEDURE — 6360000002 HC RX W HCPCS: Performed by: ANESTHESIOLOGY

## 2020-01-09 RX ORDER — SODIUM CHLORIDE 0.9 % (FLUSH) 0.9 %
10 SYRINGE (ML) INJECTION EVERY 12 HOURS SCHEDULED
Status: DISCONTINUED | OUTPATIENT
Start: 2020-01-09 | End: 2020-01-09 | Stop reason: HOSPADM

## 2020-01-09 RX ORDER — NEOSTIGMINE METHYLSULFATE 5 MG/5 ML
SYRINGE (ML) INTRAVENOUS PRN
Status: DISCONTINUED | OUTPATIENT
Start: 2020-01-09 | End: 2020-01-09 | Stop reason: SDUPTHER

## 2020-01-09 RX ORDER — SODIUM CHLORIDE, SODIUM LACTATE, POTASSIUM CHLORIDE, CALCIUM CHLORIDE 600; 310; 30; 20 MG/100ML; MG/100ML; MG/100ML; MG/100ML
INJECTION, SOLUTION INTRAVENOUS CONTINUOUS
Status: DISCONTINUED | OUTPATIENT
Start: 2020-01-09 | End: 2020-01-09

## 2020-01-09 RX ORDER — ACETAMINOPHEN 500 MG
1000 TABLET ORAL EVERY 8 HOURS
Status: DISCONTINUED | OUTPATIENT
Start: 2020-01-09 | End: 2020-01-09 | Stop reason: HOSPADM

## 2020-01-09 RX ORDER — KETOROLAC TROMETHAMINE 30 MG/ML
30 INJECTION, SOLUTION INTRAMUSCULAR; INTRAVENOUS EVERY 6 HOURS
Status: DISCONTINUED | OUTPATIENT
Start: 2020-01-09 | End: 2020-01-09 | Stop reason: HOSPADM

## 2020-01-09 RX ORDER — TRAZODONE HYDROCHLORIDE 50 MG/1
50 TABLET ORAL NIGHTLY
COMMUNITY
End: 2021-03-03

## 2020-01-09 RX ORDER — OXYCODONE HYDROCHLORIDE AND ACETAMINOPHEN 5; 325 MG/1; MG/1
1 TABLET ORAL EVERY 6 HOURS PRN
Qty: 12 TABLET | Refills: 0 | Status: SHIPPED | OUTPATIENT
Start: 2020-01-09 | End: 2020-01-12

## 2020-01-09 RX ORDER — KETOROLAC TROMETHAMINE 30 MG/ML
15 INJECTION, SOLUTION INTRAMUSCULAR; INTRAVENOUS EVERY 6 HOURS
Status: DISCONTINUED | OUTPATIENT
Start: 2020-01-09 | End: 2020-01-09

## 2020-01-09 RX ORDER — GLYCOPYRROLATE 1 MG/5 ML
SYRINGE (ML) INTRAVENOUS PRN
Status: DISCONTINUED | OUTPATIENT
Start: 2020-01-09 | End: 2020-01-09 | Stop reason: SDUPTHER

## 2020-01-09 RX ORDER — SODIUM CHLORIDE 0.9 % (FLUSH) 0.9 %
10 SYRINGE (ML) INJECTION PRN
Status: DISCONTINUED | OUTPATIENT
Start: 2020-01-09 | End: 2020-01-09 | Stop reason: HOSPADM

## 2020-01-09 RX ORDER — ONDANSETRON 4 MG/1
4 TABLET, ORALLY DISINTEGRATING ORAL EVERY 8 HOURS PRN
Qty: 15 TABLET | Refills: 0 | Status: SHIPPED | OUTPATIENT
Start: 2020-01-09 | End: 2021-03-03

## 2020-01-09 RX ORDER — ONDANSETRON 2 MG/ML
4 INJECTION INTRAMUSCULAR; INTRAVENOUS EVERY 6 HOURS PRN
Status: DISCONTINUED | OUTPATIENT
Start: 2020-01-09 | End: 2020-01-09 | Stop reason: HOSPADM

## 2020-01-09 RX ORDER — BUPIVACAINE HYDROCHLORIDE AND EPINEPHRINE 2.5; 5 MG/ML; UG/ML
INJECTION, SOLUTION INFILTRATION; PERINEURAL PRN
Status: DISCONTINUED | OUTPATIENT
Start: 2020-01-09 | End: 2020-01-09 | Stop reason: ALTCHOICE

## 2020-01-09 RX ORDER — ONDANSETRON 2 MG/ML
INJECTION INTRAMUSCULAR; INTRAVENOUS PRN
Status: DISCONTINUED | OUTPATIENT
Start: 2020-01-09 | End: 2020-01-09 | Stop reason: SDUPTHER

## 2020-01-09 RX ORDER — OXYCODONE HYDROCHLORIDE 5 MG/1
5 TABLET ORAL EVERY 4 HOURS PRN
Status: DISCONTINUED | OUTPATIENT
Start: 2020-01-09 | End: 2020-01-09 | Stop reason: HOSPADM

## 2020-01-09 RX ADMIN — HYDROMORPHONE HYDROCHLORIDE 0.5 MG: 1 INJECTION, SOLUTION INTRAMUSCULAR; INTRAVENOUS; SUBCUTANEOUS at 01:15

## 2020-01-09 RX ADMIN — HYDROMORPHONE HYDROCHLORIDE 0.5 MG: 1 INJECTION, SOLUTION INTRAMUSCULAR; INTRAVENOUS; SUBCUTANEOUS at 01:30

## 2020-01-09 RX ADMIN — Medication 0.4 MG: at 00:03

## 2020-01-09 RX ADMIN — SODIUM CHLORIDE, POTASSIUM CHLORIDE, SODIUM LACTATE AND CALCIUM CHLORIDE: 600; 310; 30; 20 INJECTION, SOLUTION INTRAVENOUS at 02:49

## 2020-01-09 RX ADMIN — HYDROMORPHONE HYDROCHLORIDE 0.5 MG: 1 INJECTION, SOLUTION INTRAMUSCULAR; INTRAVENOUS; SUBCUTANEOUS at 01:25

## 2020-01-09 RX ADMIN — HYDROMORPHONE HYDROCHLORIDE 0.5 MG: 1 INJECTION, SOLUTION INTRAMUSCULAR; INTRAVENOUS; SUBCUTANEOUS at 01:20

## 2020-01-09 RX ADMIN — OXYCODONE HYDROCHLORIDE 5 MG: 5 TABLET ORAL at 06:31

## 2020-01-09 RX ADMIN — KETOROLAC TROMETHAMINE 15 MG: 30 INJECTION, SOLUTION INTRAMUSCULAR; INTRAVENOUS at 01:14

## 2020-01-09 RX ADMIN — FENTANYL CITRATE 50 MCG: 50 INJECTION, SOLUTION INTRAMUSCULAR; INTRAVENOUS at 01:05

## 2020-01-09 RX ADMIN — FENTANYL CITRATE 50 MCG: 50 INJECTION, SOLUTION INTRAMUSCULAR; INTRAVENOUS at 01:10

## 2020-01-09 RX ADMIN — Medication 0.4 MG: at 00:43

## 2020-01-09 RX ADMIN — PIPERACILLIN AND TAZOBACTAM 3.38 G: 3; .375 INJECTION, POWDER, FOR SOLUTION INTRAVENOUS at 03:30

## 2020-01-09 RX ADMIN — OXYCODONE HYDROCHLORIDE 5 MG: 5 TABLET ORAL at 02:19

## 2020-01-09 RX ADMIN — SODIUM CHLORIDE, PRESERVATIVE FREE 10 ML: 5 INJECTION INTRAVENOUS at 08:51

## 2020-01-09 RX ADMIN — ACETAMINOPHEN 1000 MG: 500 TABLET ORAL at 02:19

## 2020-01-09 RX ADMIN — OXYCODONE HYDROCHLORIDE 5 MG: 5 TABLET ORAL at 10:10

## 2020-01-09 RX ADMIN — KETOROLAC TROMETHAMINE 30 MG: 30 INJECTION, SOLUTION INTRAMUSCULAR; INTRAVENOUS at 06:31

## 2020-01-09 RX ADMIN — ONDANSETRON 4 MG: 2 INJECTION, SOLUTION INTRAMUSCULAR; INTRAVENOUS at 00:43

## 2020-01-09 RX ADMIN — ACETAMINOPHEN 1000 MG: 500 TABLET ORAL at 10:10

## 2020-01-09 RX ADMIN — FENTANYL CITRATE 50 MCG: 50 INJECTION, SOLUTION INTRAMUSCULAR; INTRAVENOUS at 01:15

## 2020-01-09 RX ADMIN — FENTANYL CITRATE 50 MCG: 50 INJECTION, SOLUTION INTRAMUSCULAR; INTRAVENOUS at 01:00

## 2020-01-09 RX ADMIN — Medication 3 MG: at 00:43

## 2020-01-09 ASSESSMENT — PULMONARY FUNCTION TESTS
PIF_VALUE: 22
PIF_VALUE: 24
PIF_VALUE: 0
PIF_VALUE: 5
PIF_VALUE: 24
PIF_VALUE: 23
PIF_VALUE: 24
PIF_VALUE: 25
PIF_VALUE: 19
PIF_VALUE: 24
PIF_VALUE: 25
PIF_VALUE: 24
PIF_VALUE: 24
PIF_VALUE: 18
PIF_VALUE: 24
PIF_VALUE: 2
PIF_VALUE: 16
PIF_VALUE: 24
PIF_VALUE: 0
PIF_VALUE: 18
PIF_VALUE: 19
PIF_VALUE: 19
PIF_VALUE: 24
PIF_VALUE: 22
PIF_VALUE: 22
PIF_VALUE: 24
PIF_VALUE: 24
PIF_VALUE: 17
PIF_VALUE: 24
PIF_VALUE: 23
PIF_VALUE: 0
PIF_VALUE: 24
PIF_VALUE: 24
PIF_VALUE: 25
PIF_VALUE: 19
PIF_VALUE: 25
PIF_VALUE: 25
PIF_VALUE: 24
PIF_VALUE: 24
PIF_VALUE: 25
PIF_VALUE: 25
PIF_VALUE: 1
PIF_VALUE: 18
PIF_VALUE: 25
PIF_VALUE: 24
PIF_VALUE: 19
PIF_VALUE: 22
PIF_VALUE: 24
PIF_VALUE: 18
PIF_VALUE: 23
PIF_VALUE: 25
PIF_VALUE: 21
PIF_VALUE: 25

## 2020-01-09 ASSESSMENT — PAIN DESCRIPTION - PAIN TYPE
TYPE: SURGICAL PAIN
TYPE: SURGICAL PAIN

## 2020-01-09 ASSESSMENT — PAIN SCALES - GENERAL
PAINLEVEL_OUTOF10: 10
PAINLEVEL_OUTOF10: 7
PAINLEVEL_OUTOF10: 6
PAINLEVEL_OUTOF10: 10
PAINLEVEL_OUTOF10: 10
PAINLEVEL_OUTOF10: 5
PAINLEVEL_OUTOF10: 10
PAINLEVEL_OUTOF10: 9
PAINLEVEL_OUTOF10: 10
PAINLEVEL_OUTOF10: 5
PAINLEVEL_OUTOF10: 10
PAINLEVEL_OUTOF10: 8
PAINLEVEL_OUTOF10: 10

## 2020-01-09 ASSESSMENT — PAIN DESCRIPTION - LOCATION: LOCATION: ABDOMEN

## 2020-01-09 ASSESSMENT — PAIN DESCRIPTION - ORIENTATION: ORIENTATION: LOWER

## 2020-01-09 ASSESSMENT — PAIN DESCRIPTION - DESCRIPTORS: DESCRIPTORS: SHARP

## 2020-01-09 NOTE — ED PROVIDER NOTES
[facility] ED  Emergency Department Encounter  EmergencyMedicine Resident     Pt Name:Suraj Gao  MRN: [id number]  [place] 1995  Date of evaluation: 1/8/20  PCP:  No primary care provider on file. CHIEF COMPLAINT       Chief Complaint   Patient presents with    Abdominal Pain     Right lower radiates to left side. Started couple days ago       HISTORY OF PRESENT ILLNESS  (Location/Symptom, Timing/Onset, Context/Setting, Quality, Duration, Modifying Factors, Severity.)      Mcikey Perez is a 30 y.o. male who presents with right lower quadrant pain since last night. Patient states that occasionally radiates to left side, feels deep. Patient states that he has decreased appetite has been drinking water but not eating. Patient states he has intermittent feelings of fevers and chills. No surgical history, continues to pass gas. Patient has no other medical problems, does not take medication daily. PAST MEDICAL / SURGICAL / SOCIAL / FAMILY HISTORY     No past medical history   has a past surgical history that includes knee surgery.     Social History     Socioeconomic History    Marital status: Single     Spouse name: Not on file    Number of children: Not on file    Years of education: Not on file    Highest education level: Not on file   Occupational History    Not on file   Social Needs    Financial resource strain: Not on file    Food insecurity:     Worry: Not on file     Inability: Not on file    Transportation needs:     Medical: Not on file     Non-medical: Not on file   Tobacco Use    Smoking status: Current Every Day Smoker    Smokeless tobacco: Never Used   Substance and Sexual Activity    Alcohol use: No    Drug use: Yes     Types: Opiates     Sexual activity: Not on file   Lifestyle    Physical activity:     Days per week: Not on file     Minutes per session: Not on file    Stress: Not on file   Relationships    Social connections:     Talks on Ref Range    WBC 8.0 3.5 - 11.3 k/uL    RBC 5.36 4.21 - 5.77 m/uL    Hemoglobin 15.7 13.0 - 17.0 g/dL    Hematocrit 47.5 40.7 - 50.3 %    MCV 88.6 82.6 - 102.9 fL    MCH 29.3 25.2 - 33.5 pg    MCHC 33.1 28.4 - 34.8 g/dL    RDW 13.4 11.8 - 14.4 %    Platelets 990 783 - 459 k/uL    MPV 10.7 8.1 - 13.5 fL    NRBC Automated 0.0 0.0 per 100 WBC    Differential Type NOT REPORTED     Seg Neutrophils 71 (H) 36 - 65 %    Lymphocytes 20 (L) 24 - 43 %    Monocytes 7 3 - 12 %    Eosinophils % 1 1 - 4 %    Basophils 0 0 - 2 %    Immature Granulocytes 1 (H) 0 %    Segs Absolute 5.70 1.50 - 8.10 k/uL    Absolute Lymph # 1.59 1.10 - 3.70 k/uL    Absolute Mono # 0.57 0.10 - 1.20 k/uL    Absolute Eos # 0.10 0.00 - 0.44 k/uL    Basophils Absolute 0.03 0.00 - 0.20 k/uL    Absolute Immature Granulocyte 0.04 0.00 - 0.30 k/uL    WBC Morphology NOT REPORTED     RBC Morphology NOT REPORTED     Platelet Estimate NOT REPORTED    Basic Metabolic Panel   Result Value Ref Range    Glucose 92 70 - 99 mg/dL    BUN 12 6 - 20 mg/dL    CREATININE 0.86 0.70 - 1.20 mg/dL    Bun/Cre Ratio NOT REPORTED 9 - 20    Calcium 9.6 8.6 - 10.4 mg/dL    Sodium 136 135 - 144 mmol/L    Potassium 4.8 3.7 - 5.3 mmol/L    Chloride 98 98 - 107 mmol/L    CO2 25 20 - 31 mmol/L    Anion Gap 13 9 - 17 mmol/L    GFR Non-African American >60 >60 mL/min    GFR African American >60 >60 mL/min    GFR Comment          GFR Staging NOT REPORTED        IMPRESSION: Geovanni Jean-Baptiste is a 30 y.o. presenting with right lower quadrant tenderness and abdominal pain. Concerning for sinusitis given patient's lack of appetite and subjective fevers. Vitals normal, afebrile. Patient will receive CT scan to evaluate for appendicitis. Laboratory evaluation, if negative will be given return precautions. RADIOLOGY:  CT ABDOMEN PELVIS W IV CONTRAST Additional Contrast? None   Final Result   Acute appendicitis as described.                EKG  None    All EKG's are interpreted by the Emergency Department Physician who either signs or Co-signs this chart in the absence of a cardiologist.    EMERGENCY DEPARTMENT COURSE:  Acute appendicitis on CT, general surgery consulted. Patient consented for surgery by general surgery, 2 OR from ED    PROCEDURES:  None    CONSULTS:  IP CONSULT TO GENERAL SURGERY    CRITICAL CARE:  None    FINAL IMPRESSION      1. Acute appendicitis, unspecified acute appendicitis type          DISPOSITION / PLAN     DISPOSITION Decision To Admit 01/08/2020 10:06:50 PM      PATIENT REFERRED TO:  No follow-up provider specified.     DISCHARGE MEDICATIONS:  New Prescriptions    No medications on file       Rukhsana Espino MD  Emergency Medicine Resident    (Please note that portions of thisnote were completed with a voice recognition program.  Efforts were made to edit the dictations but occasionally words are mis-transcribed.)        Rukhsana Espino MD  01/08/20 8521

## 2020-01-09 NOTE — ED NOTES
Pt. Ambulatory with steady gait to restroom; urine sample provided, labeled correctly and sent to lab  Pt.  Requesting pain medication; Dr. Sharifa Asencio notified     Jenniffer Yuan RN  01/08/20 8938

## 2020-01-09 NOTE — OP NOTE
underlying tissues were evaluated and no injury was seen upon entry into the abdomen. We then evaluated the right lower quadrant. At first the appendix was unable to be seen. It was retrocecal according to the CT abdomen. We then placed an additional 5 mm port suprapubically under direct observation. We also placed a port in the left lower quadrant under direct visualization. This port was also 5 mm. We then had the patient placed in the Trendelenburg position and rotated to the left. An atraumatic grasper was then used to move the small bowel cephalad. We were then able to identify the appendix which was adhered to the abdominal wall. The appendix was acutely inflamed and the diagnosis of acute appendicitis was confirmed. A LigaSure was used to take down the surrounding attachments which were preventing the appendix from being elevated off of the abdominal wall. Care was taken to avoid any injury to the cecum or the terminal ileum which was also adhered to the abdominal wall. We were able to safely dissect the appendix free from the abdominal wall. We then used a [place] to create a window in the mesoappendix at the base of the appendix. The base was clearly identified where the appendix entered into the cecum. Once this window was created in the mesoappendix we then fired a Endo BLOSSOM blue load stapler across the appendix. When this was completed we then used a white load on the Endo BLOSSOM stapler to staple across the mesoappendix. When this was completed the staple lines were evaluated and no bleeding was noted. The appendix was placed in an Endo Catch bag. The right lower quadrant was then suctioned and closely evaluated and the staple lines appeared intact and there was no evidence of bleeding. The suprapubic and left lower quadrant ports were then removed under direct observation. We then removed the appendix through the supraumbilical port in the Endo Catch bag.   This was sent to

## 2020-01-09 NOTE — ED PROVIDER NOTES
Emergency Medicine Attending Note    I have seen and examined the patient in conjunction with the Resident/MLP. Please see my key portion documented:      I agree with the assessment and plan as discussed with Dr. Radha Rod. Acute retrocecal appendicitis. Electronically signed:  Junaid Wood M.D.           Lorena Hightower MD  01/08/20 Praveen Cano MD  01/09/20 [id number]

## 2020-01-09 NOTE — H&P
History and Physical - General Surgery    PATIENT NAME: Autumn Soriano  AGE: 30 y.o. MEDICAL RECORD NO. [id number]  DATE: 1/8/2020  SURGEON: Dr. Pooja Birch: No primary care provider on file. Patient evaluated at the request of  Dr. Hudson Odell  Reason for evaluation: Acute appendicitis    Patient information was obtained from patient. History/Exam limitations: none. Patient presented to the Emergency Department by private vehicle. IMPRESSION:     1. Acute appendicitis  2. IV drug abuse  3. Tobacco abuse      MEDICAL DECISION MAKING AND PLAN:   1. Laparoscopic appendectomy, possible open. Diagnoses and treatment discussed with the patient, risks and benefits were discussed and all questions answered. Informed consent obtained. 2. Admit patient post operatively for hydration and observation, will initiate clear liquid diet in post operative period. 3. AM labs cbc/bmp  4. IS q1h  5. Pt to ambulate TID and be up in chair  6. Nausea control w/ zofran  7. Pain control w/ APA, IBU, pt requesting no opioid pain medications  8. DVT ppx- EPC cuffs  9. Pt likely d/c in AM if doing well post op   10. Counseled smoking cessation  11. Counseled IV drug use cessation, pt is beginning rehabilitation program, requesting no opioid pain medication at this time      HISTORY:   History of Chief Complaint:    Autumn Soriano is a 30 y.o. male who presents with acute abdominal pain and anorexia with fevers for one day's duration. Symptom onset has been acute for a time period of one day(s). Severity is described as moderate to severe. Course of his symptoms over time is gradual and worsening. Pt is a daily smoker, uses heroin last on 12/28/19, denies ETOH. PMH significant for hepatitis C, history of right knee surgery. Past Medical History   has no past medical history on file. Past Surgical History   has a past surgical history that includes knee surgery.   Medications  Prior to Admission medications normal respiratory rate and rhythm  CARDIOVASCULAR: Heart regular rate and rhythm  ABDOMEN: soft, ttp RLQ>LLQ, without guarding, rebound, or peritoneal signs  NEUROLOGIC: There are no focalizing motor or sensory deficits  EXTREMITIES: no cyanosis, clubbing or edema    LABS:     Recent Labs     01/08/20 2002   WBC 8.0   HGB 15.7   HCT 47.5         K 4.8   CL 98   CO2 25   BUN 12   CREATININE 0.86   CALCIUM 9.6     No results for input(s): ALKPHOS, ALT, AST, BILITOT, BILIDIR, LABALBU, AMYLASE, LIPASE in the last 72 hours. CBC with Differential:    Lab Results   Component Value Date    WBC 8.0 01/08/2020    RBC 5.36 01/08/2020    HGB 15.7 01/08/2020    HCT 47.5 01/08/2020     01/08/2020    MCV 88.6 01/08/2020    MCH 29.3 01/08/2020    MCHC 33.1 01/08/2020    RDW 13.4 01/08/2020    LYMPHOPCT 20 01/08/2020    MONOPCT 7 01/08/2020    BASOPCT 0 01/08/2020    MONOSABS 0.57 01/08/2020    LYMPHSABS 1.59 01/08/2020    EOSABS 0.10 01/08/2020    BASOSABS 0.03 01/08/2020    DIFFTYPE NOT REPORTED 01/08/2020     CMP:    Lab Results   Component Value Date     01/08/2020    K 4.8 01/08/2020    CL 98 01/08/2020    CO2 25 01/08/2020    BUN 12 01/08/2020    CREATININE 0.86 01/08/2020    GFRAA >60 01/08/2020    LABGLOM >60 01/08/2020    GLUCOSE 92 01/08/2020    PROT 7.0 07/27/2019    LABALBU 4.1 07/27/2019    CALCIUM 9.6 01/08/2020    BILITOT 0.20 07/27/2019    ALKPHOS 73 07/27/2019    AST 21 07/27/2019    ALT 26 07/27/2019       RADIOLOGY:   Ct Abdomen Pelvis W Iv Contrast Additional Contrast? None    Result Date: 1/8/2020  Acute appendicitis as described. Thank you for the interesting evaluation. Further recommendations to follow.     Antonina Arechiga DO  1/8/20, 10:32 PM

## 2020-01-09 NOTE — PROGRESS NOTES
PROGRESS NOTE          PATIENT NAME: Ashleigh Ansari  MEDICAL RECORD NO. [id number]  DATE: 2020  SURGEON: Aureliano Bagley  PRIMARY CARE PHYSICIAN: No primary care provider on file. HD: # 0    ASSESSMENT    Patient Active Problem List   Diagnosis    Appendicitis   POD 0 s/p lap appy    MEDICAL DECISION MAKING AND PLAN    1. General diet  2. Pain control   3. Encourage ambulation and IS use  4. Plan for discharge when pain controlled and tolerating diet, hopefully later today. 5. Follow up in 2 weeks for post op check      Chief Complaint: \"Abd pain\"    Shauna Gray is feeling much better this morning. Afebrile. Hemodynamically stable. Tolerating clears, denies n/v. Feeling hungry. Incisions c/d/i. Abd soft and non tender. OBJECTIVE  VITALS: Temp: Temp: 98.7 °F (37.1 °C)Temp  Av.7 °F (37.1 °C)  Min: 98.2 °F (36.8 °C)  Max: 98.9 °F (82.5 °C) BP Systolic (08SGV), NME:175 , Min:82 , ZUD:114   Diastolic (52TPL), ANALISA:14, Min:49, Max:118   Pulse Pulse  Av.4  Min: 64  Max: 91 Resp Resp  Av.8  Min: 0  Max: 22 Pulse ox SpO2  Av.5 %  Min: 96 %  Max: 100 %  GENERAL: alert, no distress  NEURO: no neuro deficits   HEENT: atraumatic  : normal  LUNGS: clear to ausculation, without wheezes, rales or rhonci  HEART: normal rate and regular rhythm  ABDOMEN: soft, non-tender, non-distended and incisions c/d/i  EXTREMITY: no cyanosis, clubbing or edema    I/O last 3 completed shifts: In: 950 [I.V.:900; IV Piggyback:50]  Out: 10 [Blood:10]    Drain/tube output: In: 950 [I.V.:900]  Out: 10     LAB:  CBC:   Recent Labs     20   WBC 8.0   HGB 15.7   HCT 47.5   MCV 88.6        BMP:   Recent Labs     20      K 4.8   CL 98   CO2 25   BUN 12   CREATININE 0.86   GLUCOSE 92     COAGS: No results for input(s): APTT, PROT, INR in the last 72 hours.     RADIOLOGY:  CXR:       Deena Levine DO  20, 7:26 AM

## 2020-01-09 NOTE — ANESTHESIA PRE PROCEDURE
Department of Anesthesiology  Preprocedure Note       Name:  Cassandra Dumont   Age:  30 y.o.  :  1995                                          MRN:  [id number]         Date:  2020      Surgeon: Kam David):  Herbert Parker MD    Procedure: APPENDECTOMY LAPAROSCOPIC (N/A )    Medications prior to admission:   Prior to Admission medications    Medication Sig Start Date End Date Taking? Authorizing Provider   ibuprofen (IBU) 400 MG tablet Take 1 tablet by mouth every 8 hours as needed for Pain 18   Paulette Schultz MD   Pseudoephedrine HCl 30 MG CAPS Take 1 capsule by mouth every 6-8 hours as needed (congestion and ear fullness) Avoid use 6 hours prior to anticipated bedtime to avoid insomnia. 18   Paulette Schultz MD   cetirizine (ZYRTEC) 10 MG tablet Take 1 tablet by mouth daily 18   Paulette Schultz MD   QUEtiapine (SEROQUEL) 200 MG tablet Take 200 mg by mouth nightly    Historical Provider, MD       Current medications:    No current facility-administered medications for this encounter. Current Outpatient Medications   Medication Sig Dispense Refill    ibuprofen (IBU) 400 MG tablet Take 1 tablet by mouth every 8 hours as needed for Pain 20 tablet 0    Pseudoephedrine HCl 30 MG CAPS Take 1 capsule by mouth every 6-8 hours as needed (congestion and ear fullness) Avoid use 6 hours prior to anticipated bedtime to avoid insomnia. 12 capsule 0    cetirizine (ZYRTEC) 10 MG tablet Take 1 tablet by mouth daily 15 tablet 0    QUEtiapine (SEROQUEL) 200 MG tablet Take 200 mg by mouth nightly         Allergies:  No Known Allergies    Problem List:  There is no problem list on file for this patient. Past Medical History:  History reviewed. No pertinent past medical history.     Past Surgical History:        Procedure Laterality Date    KNEE SURGERY         Social History:    Social History     Tobacco Use    Smoking status: Current Every Day Smoker    Smokeless tobacco: Never Used Cardiovascular:Negative CV ROS            Rhythm: regular  Rate: normal                    Neuro/Psych:   Negative Neuro/Psych ROS  (+) depression/anxiety             GI/Hepatic/Renal:   (+) hepatitis: C,           Endo/Other: Negative Endo/Other ROS                    Abdominal:   (+) obese,         Vascular:                                        Anesthesia Plan      general     ASA 2 - emergent       Induction: intravenous and rapid sequence. Anesthetic plan and risks discussed with patient and Unable to obtain due to emergent nature. Plan discussed with CRNA.                   Zev Mata MD   1/8/2020

## 2020-01-09 NOTE — PROGRESS NOTES
Patient was able to tolerate a general diet as well as ambulate in the room with no assistance.  Patient states he is ready to go home

## 2020-01-09 NOTE — PROGRESS NOTES
POST OP NOTE    SUBJECTIVE  Pt s/p laparoscopic appendectomy . Reports some mild abdominal pain but overall tolerating pain well. Denies any nausea or vomiting. Denies passing any flatus since surgery. Denies pain elsewhere. Denies fever, chills. VSS, afebrile. OBJECTIVE  VITALS:  BP (!) 145/78   Pulse 80   Temp 98.7 °F (37.1 °C) (Oral)   Resp 13   Ht 5' 9\" (1.753 m)   Wt 225 lb (102.1 kg)   SpO2 97%   BMI 33.23 kg/m²         GENERAL:  awake and alert. No acute distress  CARDIOVASCULAR:  regular rate and rhythm   LUNGS:  CTA Bilaterally  ABDOMEN:   Abdomen soft, non-tender, non-distended  INCISION: Incision clean/dry/intact    ASSESSMENT  1. POD# 0 s/p laparoscopic appendectomy    PLAN  1. Pain management- Tylenol, toradol, morphine PRN  2. DVT proph- SCDs  3. Diet - clear liquid. Will advance as tolerated  4. Zosyn given pre-operatively. No signs/symptoms of infectious process at this time.       Kat Real  Trauma/Surgery Service  1/9/2020 at 3:46 AM

## 2020-01-09 NOTE — PROGRESS NOTES
Smoking Cessation - topics covered   []  Health Risks  []  Benefits of Quitting   []  Smoking Cessation  []  Patient has no history of tobacco use per note in significant history. []  Patient is former smoker. Per note in significant history, patient quit in   []  No need for tobacco cessation education. []  Booklet given  []  Patient verbalizes understanding. []  Patient denies need for tobacco cessation education. [x]  Unable to meet with patient today.      Kee Michelle  2:03 PM

## 2020-01-10 LAB — SURGICAL PATHOLOGY REPORT: NORMAL

## 2020-08-17 ENCOUNTER — HOSPITAL ENCOUNTER (EMERGENCY)
Age: 25
Discharge: HOME | End: 2020-08-17
Attending: EMERGENCY MEDICINE
Payer: MEDICARE

## 2020-08-17 VITALS
HEIGHT: 69 IN | BODY MASS INDEX: 27.4 KG/M2 | WEIGHT: 185 LBS | DIASTOLIC BLOOD PRESSURE: 80 MMHG | HEART RATE: 86 BPM | TEMPERATURE: 98.4 F | SYSTOLIC BLOOD PRESSURE: 119 MMHG | RESPIRATION RATE: 16 BRPM | OXYGEN SATURATION: 97 %

## 2020-08-17 PROCEDURE — 99283 EMERGENCY DEPT VISIT LOW MDM: CPT

## 2020-08-17 ASSESSMENT — ENCOUNTER SYMPTOMS
NAUSEA: 0
VOMITING: 0
BACK PAIN: 0
SHORTNESS OF BREATH: 0
ABDOMINAL PAIN: 0

## 2020-08-17 NOTE — ED NOTES
contacted [facility] for transport back to [facility].        Eliezer Singletary MSW, ELVAW     Nicholas Cody  08/17/20 [id number]
Bed: 22  Expected date:   Expected time:   Means of arrival:   Comments:  [address] RN  08/17/20 8500
Pt to the ED via EMS following an overdose. PT states that he was at the [facility] to enter the 30 day sobriety program and snorted the last of the drugs he had on him. Pt usually does IV Heroin but is unsure of the substance that he most recently inhaled. Pt received 8mg [place] narcan by police and 2mg IV Narcan by EMS before being revived.  Pt arrived to ED alert and oriented x4, no signs of acute distress, and cooperative       Jessica Franks RN  08/17/20 806 Roma Corona RN  08/17/20 4923
normal...

## 2020-08-17 NOTE — ED PROVIDER NOTES
[facility] ED     Emergency Department     Faculty Attestation        I performed a history and physical examination of the patient and discussed management with the resident. I reviewed the residents note and agree with the documented findings and plan of care. Any areas of disagreement are noted on the chart. I was personally present for the key portions of any procedures. I have documented in the chart those procedures where I was not present during the key portions. I have reviewed the emergency nurses triage note. I agree with the chief complaint, past medical history, past surgical history, allergies, medications, social and family history as documented unless otherwise noted below. For Physician Assistant/ Nurse Practitioner cases/documentation I have personally evaluated this patient and have completed at least one if not all key elements of the E/M (history, physical exam, and MDM). Additional findings are as noted. Vital Signs: BP: 119/80  Pulse: 86  Resp: 16  Temp: 98.4 °F (36.9 °C) SpO2: 97 %  PCP:  No primary care provider on file. Pertinent Comments:         Critical Care  None    This patient was evaluated in the Emergency Department for symptoms described in the history of present illness. He/she was evaluated in the context of the global COVID-19 pandemic, which necessitated consideration that the patient might be at risk for infection with the SARS-CoV-2 virus that causes COVID-19. Institutional protocols and algorithms that pertain to the evaluation of patients at risk for COVID-19 are in a state of rapid change based on information released by regulatory bodies including the CDC and federal and state organizations. These policies and algorithms were followed during the patient's care in the ED.     (Please note that portions of this note were completed with a voice recognition program. Efforts were made to edit the dictations but

## 2020-08-17 NOTE — ED PROVIDER NOTES
[facility] ED  Emergency Department Encounter  Emergency Medicine Resident     Pt Name: Berto Mei  MRN: [id number]  [place] 1995  Date of evaluation: 8/17/20  PCP:  No primary care provider on file. CHIEF COMPLAINT       Chief Complaint   Patient presents with    Drug Overdose       HISTORY OFPRESENT ILLNESS  (Location/Symptom, Timing/Onset, Context/Setting, Quality, Duration, Modifying Factors,Severity.)      Berto Mei is a 30 y. o.yo male who presents with Overdose. Patient is a known user of opioid/heroin, states that he is been try to get into rehab. Has an appointment to be in staff, states that he went back to a dealer that he used to associate with in the past has not had this product in a while, states that the product might be stronger than he remembers, snorted the product and then promptly O deed. States that he was down on the floor was given 2 mg of IV Narcan came to. States that he is feeling at baseline right now. Denies any weakness focal neuro deficits, injuries or pain at this time. States that he did not fall and hit his head. The last thing he remembers was snorting the drugs. Denies any fevers or chills. States that he feels well at this time, is not having any shortness of breath. Alert and oriented x4. PAST MEDICAL / SURGICAL / SOCIAL / FAMILY HISTORY      has no past medical history on file. has a past surgical history that includes knee surgery; Appendectomy (01/08/2020); and laparoscopic appendectomy (N/A, 1/8/2020).      Social History     Socioeconomic History    Marital status: Single     Spouse name: Not on file    Number of children: Not on file    Years of education: Not on file    Highest education level: Not on file   Occupational History    Not on file   Social Needs    Financial resource strain: Not on file    Food insecurity     Worry: Not on file     Inability: Not on file    Transportation needs     Medical: Not on file     Non-medical: Not on file   Tobacco Use    Smoking status: Current Every Day Smoker    Smokeless tobacco: Never Used   Substance and Sexual Activity    Alcohol use: No    Drug use: Yes     Types: Opiates , IV    Sexual activity: Not on file   Lifestyle    Physical activity     Days per week: Not on file     Minutes per session: Not on file    Stress: Not on file   Relationships    Social connections     Talks on phone: Not on file     Gets together: Not on file     Attends Faith service: Not on file     Active member of club or organization: Not on file     Attends meetings of clubs or organizations: Not on file     Relationship status: Not on file    Intimate partner violence     Fear of current or ex partner: Not on file     Emotionally abused: Not on file     Physically abused: Not on file     Forced sexual activity: Not on file   Other Topics Concern    Not on file   Social History Narrative    Not on file       History reviewed. No pertinent family history. Allergies:  Patient has no known allergies. Home Medications:  Prior to Admission medications    Medication Sig Start Date End Date Taking? Authorizing Provider   ondansetron (ZOFRAN ODT) 4 MG disintegrating tablet Take 1 tablet by mouth every 8 hours as needed for Nausea or Vomiting 1/9/20   Suleiman Malik DO   traZODone (DESYREL) 50 MG tablet Take 50 mg by mouth nightly    Historical Provider, MD   ibuprofen (IBU) 400 MG tablet Take 1 tablet by mouth every 8 hours as needed for Pain 6/13/18   Trevin Chapin MD   QUEtiapine (SEROQUEL) 200 MG tablet Take 200 mg by mouth nightly    Historical Provider, MD       REVIEW OFSYSTEMS    (2-9 systems for level 4, 10 or more for level 5)      Review of Systems   Constitutional: Negative for diaphoresis and fever. HENT: Negative for congestion. Eyes: Negative for visual disturbance. Respiratory: Negative for shortness of breath. Cardiovascular: Negative for chest pain. Gastrointestinal: Negative for abdominal pain, nausea and vomiting. Endocrine: Negative for polyuria. Genitourinary: Negative for dysuria. Musculoskeletal: Negative for back pain. Skin: Negative for wound. Neurological: Negative for headaches. Psychiatric/Behavioral: Negative for confusion. PHYSICAL EXAM   (up to 7 for level 4, 8 or more forlevel 5)      ED TRIAGE VITALS BP: 119/80, Temp: 98.4 °F (36.9 °C), Pulse: 86, Resp: 16, SpO2: 97 %    Vitals:    08/17/20 1420   BP: 119/80   Pulse: 86   Resp: 16   Temp: 98.4 °F (36.9 °C)   SpO2: 97%   Weight: 185 lb (83.9 kg)   Height: 5' 9\" (1.753 m)       Physical Exam  Constitutional:       General: He is not in acute distress. Appearance: He is well-developed. HENT:      Head: Normocephalic and atraumatic. Nose: Nose normal.   Eyes:      Pupils: Pupils are equal, round, and reactive to light. Neck:      Musculoskeletal: Normal range of motion and neck supple. Cardiovascular:      Rate and Rhythm: Normal rate and regular rhythm. Heart sounds: No murmur. Pulmonary:      Effort: Pulmonary effort is normal. No respiratory distress. Breath sounds: No stridor. No wheezing. Abdominal:      General: There is no distension. Palpations: Abdomen is soft. Tenderness: There is no abdominal tenderness. Musculoskeletal: Normal range of motion. General: No tenderness. Skin:     General: Skin is warm and dry. Capillary Refill: Capillary refill takes less than 2 seconds. Findings: No erythema or rash. Neurological:      Mental Status: He is alert and oriented to person, place, and time. Sensory: No sensory deficit.       Deep Tendon Reflexes: Reflexes normal.   Psychiatric:         Behavior: Behavior normal.         DIFFERENTIAL  DIAGNOSIS     PLAN (LABS / IMAGING / EKG):  Orders Placed This Encounter   Procedures    Inpatient consult to Social Work       MEDICATIONS ORDERED:  No orders of the defined types were placed in this encounter. DDX:     Overdose, opoid     Initial MDM/Plan: 30 y.o. male who presents with overdose, states that he took heroin earlier, snorted that medication, states that he has a history of abuse and is trying to quit himself appointment set up and reservation made. Medically stable, physical exam unremarkable. Will observe for 2 hours discharge. Also get social work involved to make sure patient has placement. DIAGNOSTIC RESULTS / EMERGENCYDEPARTMENT COURSE / MDM     LABS:  No results found for this visit on 08/17/20. RADIOLOGY:  No orders to display         EMERGENCY DEPARTMENT COURSE:  ED Course as of Aug 18 2120   [facility] Aug 17, 2020   1424 Patient seen and assessed in the emergency department no acute respiratory cardiovascular distress. Patient is a known user of opioid/heroin, states that he is been try to get into rehab. Has an appointment to be in staff, states that he went back to a dealer that he used to associate with in the past has not had this product in a while, states that the product might be stronger than he remembers, snorted the product and then promptly O deed. States that he was down on the floor was given 2 mg of IV Narcan came to. States that he is feeling at baseline right now. Denies any weakness focal neuro deficits, injuries or pain at this time. States that he did not fall and hit his head. The last thing he remembers was snorting the drugs. Denies any fevers or chills. States that he feels well at this time, is not having any shortness of breath. Alert and oriented x4. [PS]   1625 Stable, discharge        [PS]      ED Course User Index  [PS] Candie Mansfield MD          PROCEDURES:  None    CONSULTS:  IP CONSULT TO SOCIAL WORK    CRITICAL CARE:  Please see attending note    FINAL IMPRESSION      1.  Accidental overdose of heroin, initial encounter [facility])          DISPOSITION / PLAN     DISPOSITION Decision To Discharge 08/17/2020

## 2020-08-17 NOTE — ED PROVIDER NOTES
[facility] ED  Emergency Department Encounter  Emergency Medicine Resident     Pt Name: Adilene Sanderson  MRN: [id number]  [place] 1995  Date of evaluation: 8/17/20  PCP:  No primary care provider on file. CHIEF COMPLAINT       Chief Complaint   Patient presents with    Drug Overdose       HISTORY OFPRESENT ILLNESS  (Location/Symptom, Timing/Onset, Context/Setting, Quality, Duration, Modifying Factors,Severity.)      Adilene Sanderson is a 30 y. o.yo male who presents with ***     PAST MEDICAL / SURGICAL / SOCIAL / FAMILY HISTORY      has no past medical history on file. has a past surgical history that includes knee surgery; Appendectomy (01/08/2020); and laparoscopic appendectomy (N/A, 1/8/2020).  ***    Social History     Socioeconomic History    Marital status: Single     Spouse name: Not on file    Number of children: Not on file    Years of education: Not on file    Highest education level: Not on file   Occupational History    Not on file   Social Needs    Financial resource strain: Not on file    Food insecurity     Worry: Not on file     Inability: Not on file    Transportation needs     Medical: Not on file     Non-medical: Not on file   Tobacco Use    Smoking status: Current Every Day Smoker    Smokeless tobacco: Never Used   Substance and Sexual Activity    Alcohol use: No    Drug use: Yes     Types: Opiates , IV    Sexual activity: Not on file   Lifestyle    Physical activity     Days per week: Not on file     Minutes per session: Not on file    Stress: Not on file   Relationships    Social connections     Talks on phone: Not on file     Gets together: Not on file     Attends [employer] service: Not on file     Active member of club or organization: Not on file     Attends meetings of clubs or organizations: Not on file     Relationship status: Not on file    Intimate partner violence     Fear of current or ex partner: Not on file     Emotionally abused: Normocephalic and atraumatic. Nose: Nose normal.   Eyes:      Pupils: Pupils are equal, round, and reactive to light. Neck:      Musculoskeletal: Normal range of motion and neck supple. Cardiovascular:      Rate and Rhythm: Normal rate and regular rhythm. Heart sounds: No murmur. Pulmonary:      Effort: Pulmonary effort is normal. No respiratory distress. Breath sounds: No stridor. No wheezing. Abdominal:      General: There is no distension. Palpations: Abdomen is soft. Tenderness: There is no abdominal tenderness. Musculoskeletal: Normal range of motion. General: No tenderness. Skin:     General: Skin is warm and dry. Capillary Refill: Capillary refill takes less than 2 seconds. Findings: No erythema or rash. Neurological:      Mental Status: He is alert and oriented to person, place, and time. Sensory: No sensory deficit. Deep Tendon Reflexes: Reflexes normal.   Psychiatric:         Behavior: Behavior normal.         DIFFERENTIAL  DIAGNOSIS     PLAN (LABS / IMAGING / EKG):  Orders Placed This Encounter   Procedures    Inpatient consult to Social Work       MEDICATIONS ORDERED:  No orders of the defined types were placed in this encounter. DDX: ***    Initial MDM/Plan: 30 y.o. male who presents with ***     DIAGNOSTIC RESULTS / EMERGENCYDEPARTMENT COURSE / MDM     LABS:  No results found for this visit on 08/17/20. RADIOLOGY:  No orders to display       EKG  ***    All EKG's are interpreted by the Emergency Department Physicianwho either signs or Co-signs this chart in the absence of a cardiologist.    EMERGENCY DEPARTMENT COURSE:    ***      PROCEDURES:  None    CONSULTS:  IP CONSULT TO SOCIAL WORK    CRITICAL CARE:  Please see attending note    FINAL IMPRESSION      No diagnosis found. DISPOSITION / PLAN     DISPOSITION     ***    PATIENT REFERRED TO:  No follow-up provider specified.     DISCHARGE MEDICATIONS:  New Prescriptions No medications on file       Shirin Brower MD  Emergency Medicine Resident    (Please note that portions of this note were completed with a voice recognition program.Efforts were made to edit the dictations but occasionally words are mis-transcribed.)

## 2020-11-19 ENCOUNTER — HOSPITAL ENCOUNTER (EMERGENCY)
Age: 25
Discharge: HOME | End: 2020-11-20
Attending: EMERGENCY MEDICINE
Payer: MEDICARE

## 2020-11-19 VITALS
BODY MASS INDEX: 25.1 KG/M2 | TEMPERATURE: 98.5 F | OXYGEN SATURATION: 93 % | RESPIRATION RATE: 16 BRPM | SYSTOLIC BLOOD PRESSURE: 138 MMHG | HEART RATE: 100 BPM | DIASTOLIC BLOOD PRESSURE: 85 MMHG | WEIGHT: 170 LBS

## 2020-11-19 PROCEDURE — 99283 EMERGENCY DEPT VISIT LOW MDM: CPT

## 2020-11-20 NOTE — ED NOTES
Bed: 04  Expected date: 11/19/20  Expected time: 11:42 PM  Means of arrival: Mariel Snowsadi  Comments:  Medic 3 31 yo [address] [facility], [address] [place] [address]  11/19/20 7149

## 2020-11-20 NOTE — ED PROVIDER NOTES
[address]  ED  Emergency Department Encounter  Emergency Medicine Resident     Pt Name: Mary Ca  MRN: [id number]  [place] 1995  Date of evaluation: 11/20/20  PCP:  No primary care provider on file. CHIEF COMPLAINT       Chief Complaint   Patient presents with    Drug / Alcohol Assessment       HISTORY OFPRESENT ILLNESS  (Location/Symptom, Timing/Onset, Context/Setting, Quality, Duration, Modifying Factors,Severity.)      Mary Ca is a 30 y.o. male who presents with unfound complaint. Patient was brought here by EMS after his girlfriend was found blue and he was concerned that she overdosed, Narcan was given to her. Patient denies doing any heroin or drugs tonight, states he has been clean for 2 days and is attempting to get into a rehab facility program.  Patient has no complaints. Patient denies any chest pain, shortness of breath, nausea, vomiting, fever, chills. PAST MEDICAL / SURGICAL / SOCIAL / FAMILY HISTORY      has no past medical history on file. has a past surgical history that includes knee surgery; Appendectomy (01/08/2020); and laparoscopic appendectomy (N/A, 1/8/2020).      Social History     Socioeconomic History    Marital status: Single     Spouse name: Not on file    Number of children: Not on file    Years of education: Not on file    Highest education level: Not on file   Occupational History    Not on file   Social Needs    Financial resource strain: Not on file    Food insecurity     Worry: Not on file     Inability: Not on file    Transportation needs     Medical: Not on file     Non-medical: Not on file   Tobacco Use    Smoking status: Current Every Day Smoker    Smokeless tobacco: Never Used   Substance and Sexual Activity    Alcohol use: No    Drug use: Yes     Types: Opiates , IV    Sexual activity: Not on file   Lifestyle    Physical activity     Days per week: Not on file     Minutes per session: Not on file    Temp Temp Source Pulse Resp SpO2 Height Weight   11/19/20 2357 11/19/20 2356 11/19/20 2356 11/19/20 2357 11/19/20 2357 11/19/20 2357 -- 11/19/20 2356   138/85 98.5 °F (36.9 °C) Oral 100 16 93 %  170 lb (77.1 kg)       Physical Exam  HENT:      Head: Normocephalic and atraumatic. Eyes:      Pupils: Pupils are equal, round, and reactive to light. Neck:      Musculoskeletal: Normal range of motion and neck supple. Cardiovascular:      Rate and Rhythm: Normal rate and regular rhythm. Pulmonary:      Effort: Pulmonary effort is normal. No respiratory distress. Breath sounds: Normal breath sounds. No stridor. Abdominal:      General: Bowel sounds are normal. There is no distension. Palpations: Abdomen is soft. Tenderness: There is no abdominal tenderness. Musculoskeletal: Normal range of motion. General: No deformity. Skin:     General: Skin is warm and dry. Capillary Refill: Capillary refill takes less than 2 seconds. Neurological:      Mental Status: He is alert and oriented to person, place, and time. Psychiatric:         Behavior: Behavior normal.         DIFFERENTIAL  DIAGNOSIS     PLAN (LABS / IMAGING / EKG):  No orders of the defined types were placed in this encounter. MEDICATIONS ORDERED:  No orders of the defined types were placed in this encounter. DDX: no complaint    Initial MDM/Plan: 30 y.o. male who presents with EMS and was recommended to get checked out, however patient has no complaints. Patient was around his girlfriend who had an overdose tonight and was given Narcan, however patient has not done any drugs in 2 days. Patient does have a history of heroin abuse, however he is sober and attempting to get to a rehab facility program.    DIAGNOSTIC RESULTS / [address] / MDM     LABS:  Labs Reviewed - No data to display      RADIOLOGY:  No results found.       EKG  NA     All EKG's are interpreted by the Emergency Department

## 2020-11-20 NOTE — ED PROVIDER NOTES
[address]     Emergency Department     Faculty Attestation    I performed a history and physical examination of the patient and discussed management with the resident. I have reviewed and agree with the residents findings including all diagnostic interpretations, and treatment plans as written. Any areas of disagreement are noted on the chart. I was personally present for the key portions of any procedures. I have documented in the chart those procedures where I was not present during the key portions. I have reviewed the emergency nurses triage note. I agree with the chief complaint, past medical history, past surgical history, allergies, medications, social and family history as documented unless otherwise noted below. Documentation of the HPI, Physical Exam and Medical Decision Making performed by [email or web address] is based on my personal performance of the HPI, PE and MDM. For Physician Assistant/ Nurse Practitioner cases/documentation I have personally evaluated this patient and have completed at least one if not all key elements of the E/M (history, physical exam, and MDM). Additional findings are as noted. 29 yo M pt took trazadone, denies opiate or other illicit substance, no fever, no vomit, no injury, no suicidal or homicidal ideation, pt denies cp or abdominal pain,   pe vss gcs 15, roscoe, no cervical tenderness, crepitus or deformity, chest non tender, abdomen non tender, non distended, extremities atraumatic,     -pt observed, tolerating liquids, talkative, ambulatory, pt denies complaint, I feel pt stable for out pt tx,     EKG Interpretation    Interpreted by me      CRITICAL CARE: There was a high probability of clinically significant/life threatening deterioration in this patient's condition which required my urgent intervention. Total critical care time was 0 minutes. This excludes any time for separately reportable procedures.        Chaz Petties Linh Barajas, DO  11/20/20 18 [facility], DO  11/20/20 0253

## 2021-03-03 ENCOUNTER — HOSPITAL ENCOUNTER (EMERGENCY)
Age: 26
Discharge: HOME | End: 2021-03-03
Attending: EMERGENCY MEDICINE
Payer: MEDICARE

## 2021-03-03 VITALS
HEIGHT: 69 IN | BODY MASS INDEX: 33.47 KG/M2 | SYSTOLIC BLOOD PRESSURE: 131 MMHG | RESPIRATION RATE: 14 BRPM | HEART RATE: 75 BPM | TEMPERATURE: 97.7 F | DIASTOLIC BLOOD PRESSURE: 74 MMHG | WEIGHT: 226 LBS | OXYGEN SATURATION: 99 %

## 2021-03-03 DIAGNOSIS — M25.562 CHRONIC PAIN OF BOTH KNEES: ICD-10-CM

## 2021-03-03 DIAGNOSIS — M25.561 CHRONIC PAIN OF BOTH KNEES: ICD-10-CM

## 2021-03-03 DIAGNOSIS — G89.29 CHRONIC PAIN OF BOTH KNEES: ICD-10-CM

## 2021-03-03 DIAGNOSIS — T50.901A ACCIDENTAL DRUG OVERDOSE, INITIAL ENCOUNTER: Primary | ICD-10-CM

## 2021-03-03 PROCEDURE — 99285 EMERGENCY DEPT VISIT HI MDM: CPT

## 2021-03-03 PROCEDURE — 6370000000 HC RX 637 (ALT 250 FOR IP): Performed by: STUDENT IN AN ORGANIZED HEALTH CARE EDUCATION/TRAINING PROGRAM

## 2021-03-03 RX ORDER — ACETAMINOPHEN 500 MG
1000 TABLET ORAL 3 TIMES DAILY
Qty: 90 TABLET | Refills: 0 | Status: SHIPPED | OUTPATIENT
Start: 2021-03-03 | End: 2021-03-17 | Stop reason: SDUPTHER

## 2021-03-03 RX ORDER — ACETAMINOPHEN 500 MG
1000 TABLET ORAL ONCE
Status: COMPLETED | OUTPATIENT
Start: 2021-03-03 | End: 2021-03-03

## 2021-03-03 RX ORDER — IBUPROFEN 800 MG/1
800 TABLET ORAL ONCE
Status: COMPLETED | OUTPATIENT
Start: 2021-03-03 | End: 2021-03-03

## 2021-03-03 RX ORDER — IBUPROFEN 600 MG/1
600 TABLET ORAL 4 TIMES DAILY PRN
Qty: 30 TABLET | Refills: 0 | Status: SHIPPED | OUTPATIENT
Start: 2021-03-03 | End: 2021-03-17 | Stop reason: SDUPTHER

## 2021-03-03 RX ADMIN — IBUPROFEN 800 MG: 800 TABLET, FILM COATED ORAL at 18:08

## 2021-03-03 RX ADMIN — ACETAMINOPHEN 1000 MG: 500 TABLET ORAL at 18:08

## 2021-03-03 ASSESSMENT — PAIN DESCRIPTION - DESCRIPTORS: DESCRIPTORS: ACHING

## 2021-03-03 ASSESSMENT — PAIN SCALES - GENERAL
PAINLEVEL_OUTOF10: 10
PAINLEVEL_OUTOF10: 10

## 2021-03-03 ASSESSMENT — PAIN DESCRIPTION - FREQUENCY: FREQUENCY: CONTINUOUS

## 2021-03-03 ASSESSMENT — PAIN DESCRIPTION - LOCATION: LOCATION: HEAD

## 2021-03-03 NOTE — ED PROVIDER NOTES
[facility] ED  Emergency Department  Senior Resident Attestation     Primary Care Physician  No primary care provider on file. I performed a history and physical examination of the patient and discussed management with the lenore resident. I reviewed the lenore residents note and agree with the documented findings and plan of care. Any areas of disagreement are noted on the chart. Case was then discussed with Faculty Attending Supervisor for additional medical management. PERTINENT ATTENDING PHYSICIAN COMMENTS:    HISTORY:   Garcia Davidson is a 27 y.o. male who  has no past medical history on file. and presents with complaint of headache. Patient was picked up by [facility] police after being found unresponsive. Respond to Narcan. Her complains of a headache and diffuse body pain. States that he hit his head earlier today when he is rising from a seated position hit a tree branch. Denies consciousness. Also has pain throughout his lower extremities. PHYSICAL:   Temp: 97.7 °F (36.5 °C),  Pulse: 72, Resp: 20, BP: (!) 144/90, SpO2: 100 %  Gen: Non-toxic, Afebrile  HENT: No padilla signs, raccoon eyes or hemotympanum. Neck: Supple  Cards: Regular rate and rhythm  Pulm: Lung sounds clear to auscultation  Abdomen: Soft, non-tender, non-distended  Neur: CN II-XII intact. Full strength and sensation in upper and lower extremities bilaterally.   Skin: warm, dry  Extremities: pulses 2+ radial / dorsalis pedis, no clubbing, cyanosis, edema    IMPRESSION:   Heroin overdose, headache    PLAN:   Symptomatic treatment, observe, discharge    CRITICAL CARE TIME:    None    Briana Rasheed DO  Senior Resident Physician    (Please note that portions of this note were completed with a voice recognition program.  Efforts were made to edit the dictations but occasionally words are mis-transcribed.)        Briana Rasheed DO  Resident  03/03/21 8616

## 2021-03-03 NOTE — ED PROVIDER NOTES
[address]  ED  Emergency Department Encounter  EmergencyMedicine Resident     Pt Name:Suraj Mg  MRN: [id number]  [place] 1995  Date of evaluation: 3/3/21  PCP:  No primary care provider on file. CHIEF COMPLAINT       Chief Complaint   Patient presents with    Headache     c/o headache after given Narcan after Fentanyl Overdose. Narcan approx 1715 by [employer], being sent to booking pt complaint of heart racing/not feeling well brought for clearance       HISTORY OF PRESENT ILLNESS  (Location/Symptom, Timing/Onset, Context/Setting, Quality, Duration, Modifying Factors, Severity.)      Angie Baez is a 27 y.o. male who presents with in [facility] custody for medical clearance. Reportedly the patient was sitting at a bus stop leaned up against a glass wall unresponsive. Medical squad responded gave him Narcan at [phone] with good response. Patient denies hitting his head or traumatic injury, denies assault. Says he has had generalized body aches arthralgias myalgias. Tolerating p.o., had a [employer] meal earlier today denies chest pain shortness of breath nausea vomiting diarrhea. Patient takes 16 mg Suboxone daily, did not dose today. Does admit to using IV fentanyl. Smokes a pack a day, denies alcohol. PAST MEDICAL / SURGICAL / SOCIAL / FAMILY HISTORY      has no past medical history on file. has a past surgical history that includes knee surgery; Appendectomy (01/08/2020); and laparoscopic appendectomy (N/A, 1/8/2020).     Social History     Socioeconomic History    Marital status: Single     Spouse name: Not on file    Number of children: Not on file    Years of education: Not on file    Highest education level: Not on file   Occupational History    Not on file   Social Needs    Financial resource strain: Not on file    Food insecurity     Worry: Not on file     Inability: Not on file    Transportation needs     Medical: Not on file Non-medical: Not on file   Tobacco Use    Smoking status: Current Every Day Smoker    Smokeless tobacco: Never Used   Substance and Sexual Activity    Alcohol use: No    Drug use: Yes     Types: Opiates , IV, Marijuana     Comment: fentanyl    Sexual activity: Not on file   Lifestyle    Physical activity     Days per week: Not on file     Minutes per session: Not on file    Stress: Not on file   Relationships    Social connections     Talks on phone: Not on file     Gets together: Not on file     Attends Hindu service: Not on file     Active member of club or organization: Not on file     Attends meetings of clubs or organizations: Not on file     Relationship status: Not on file    Intimate partner violence     Fear of current or ex partner: Not on file     Emotionally abused: Not on file     Physically abused: Not on file     Forced sexual activity: Not on file   Other Topics Concern    Not on file   Social History Narrative    Not on file       History reviewed. No pertinent family history. Allergies:  Patient has no known allergies. Home Medications:  Prior to Admission medications    Medication Sig Start Date End Date Taking? Authorizing Provider   acetaminophen (TYLENOL) 500 MG tablet Take 2 tablets by mouth 3 times daily 3/3/21  Yes Jayashree Baugh MD   ibuprofen (ADVIL;MOTRIN) 600 MG tablet Take 1 tablet by mouth 4 times daily as needed for Pain 3/3/21  Yes Jayashree Baugh MD       REVIEW OF SYSTEMS    (2-9 systems for level 4, 10 or more for level 5)      Review of Systems   Constitutional: Negative for fever. HENT: Negative for congestion. Eyes: Negative for photophobia. Respiratory: Negative for shortness of breath. Cardiovascular: Negative for chest pain. Gastrointestinal: Negative for abdominal pain and vomiting. Endocrine: Negative for polyuria. Genitourinary: Negative for dysuria. Musculoskeletal: Positive for arthralgias, myalgias.    Skin: Negative for color change. Allergic/Immunologic: Negative for immunocompromised state. Neurological: Negative for dizziness. Hematological: Does not bruise/bleed easily. Psychiatric/Behavioral: Negative for agitation. PHYSICAL EXAM   (up to 7 for level 4, 8 or more for level 5)      INITIAL VITALS:   /74   Pulse 75   Temp 97.7 °F (36.5 °C) (Oral)   Resp 14   Ht 5' 9\" (1.753 m)   Wt 226 lb (102.5 kg)   SpO2 99%   BMI 33.37 kg/m²     Physical Exam  Constitutional:       General: Appears sleepy     Appearance: Sleepy appearance. Normal weight. Not toxic-appearing. HENT:      Head: Normocephalic and atraumatic. Nose: Nose normal.      Mouth/Throat: Mucous membranes are moist.  Uvula midline no oropharyngeal edema. Pharynx: Oropharynx is clear. Eyes:      Extraocular Movements: Extraocular movements intact. Conjunctiva/sclera: Conjunctivae normal.      Pupils: Pupils are equal, round, and reactive to light. Neck:      Musculoskeletal: Normal range of motion and neck supple. No neck rigidity. Cardiovascular:      Rate and Rhythm: Normal rate and regular rhythm. Pulses: Normal pulses. Heart sounds: Normal heart sounds. No murmur. Pulmonary:      Effort: Pulmonary effort is normal.      Breath sounds: Normal breath sounds. No wheezing. Abdominal:      General: Abdomen is flat. Bowel sounds are normal.      Tenderness: There is no abdominal tenderness. Musculoskeletal:     Normal range of motion. General: Bilateral knee pain on range of motion. No clicking pops deformities. No effusion no erythema no sign of trauma    Skin:     General: Skin is warm. Capillary Refill: Capillary refill takes less than 2 seconds. Coloration: Skin is not jaundiced. Neurological:      General: No focal deficit present. Mental Status: Alert and oriented to person, place, and time. Mental status is at baseline. Motor: No weakness.        DIFFERENTIAL DIAGNOSIS     PLAN (LABS / IMAGING / EKG):  No orders of the defined types were placed in this encounter. MEDICATIONS ORDERED:  Orders Placed This Encounter   Medications    acetaminophen (TYLENOL) tablet 1,000 mg    ibuprofen (ADVIL;MOTRIN) tablet 800 mg    acetaminophen (TYLENOL) 500 MG tablet     Sig: Take 2 tablets by mouth 3 times daily     Dispense:  90 tablet     Refill:  0    ibuprofen (ADVIL;MOTRIN) 600 MG tablet     Sig: Take 1 tablet by mouth 4 times daily as needed for Pain     Dispense:  30 tablet     Refill:  0           DIAGNOSTIC RESULTS / EMERGENCY DEPARTMENT COURSE / MDM     LABS:  No results found for this visit on 03/03/21. RADIOLOGY:  None    EKG  None    All EKG's are interpreted by the Emergency Department Physician who either signs or Co-signs this chart in the absence of a cardiologist.    EMERGENCY DEPARTMENT COURSE:  Patient breathing quietly and unlabored on room air. Speech is normal and speaking in full sentences without requiring to pause to take a breath. Patient tired appearing, vital signs stable, afebrile. Physical exam unremarkable no signs of trauma TMs unremarkable no padilla sign or raccoon eyes or other signs of basilar skull fracture. Patient ambulatory with a normal gait moves all extremities equally with good strength, sensation normal throughout. Abdomen soft nontender. Patient is complaining of chronic bilateral knee pain. Said he had an arthroplasty done when he was 10 does not recall the name of the surgeon or what was done. He has had pain ever since. Patient was given Narcan at [phone] will observe until 1915 for signs of reemerging opiate overdose    We will prescribe anti-inflammatory pain medication and refer the patient to orthopedic surgery for his chronic knee pain. PROCEDURES:  None    CONSULTS:  None    CRITICAL CARE:  None    FINAL IMPRESSION      1. Accidental drug overdose, initial encounter    2.  Chronic pain of both knees DISPOSITION / PLAN     DISPOSITION Decision To Discharge 03/03/2021 07:13:57 PM      PATIENT REFERRED TO:  [address] [place] [address] Mark  [phone]  Schedule an appointment as soon as possible for a visit today        DISCHARGE MEDICATIONS:  New Prescriptions    ACETAMINOPHEN (TYLENOL) 500 MG TABLET    Take 2 tablets by mouth 3 times daily    IBUPROFEN (ADVIL;MOTRIN) 600 MG TABLET    Take 1 tablet by mouth 4 times daily as needed for Pain       Juwan Lyon MD  Emergency Medicine Resident    (Please note that portions of thisnote were completed with a voice recognition program.  Efforts were made to edit the dictations but occasionally words are mis-transcribed.)       Juwan Lyon MD  Resident  03/03/21 1222

## 2021-03-04 NOTE — ED PROVIDER NOTES
[address]  ED  eMERGENCY dEPARTMENT eNCOUnter   Attending Attestation     Pt Name: Tyrone Keller  MRN: [id number]  [place] 1995  Date of evaluation: 3/3/21       Tyrone Keller is a 27 y.o. male who presents with Headache (c/o headache after given Narcan after Fentanyl Overdose. Narcan approx 1715 by [facility], being sent to booking pt complaint of heart racing/not feeling well brought for clearance)      History: Patient presents for medical clearance for retirement. Patient received Narcan after fentanyl overdose. Patient complains of pain everywhere. Exam: Heart rate and rhythm are regular. Lungs are clear to auscultation bilaterally. Abdomen is soft, nontender. Patient in no acute distress. Plan to watch the patient briefly in the ED  and plan for discharge to retirement. I performed a history and physical examination of the patient and discussed management with the resident. I reviewed the residents note and agree with the documented findings and plan of care. Any areas of disagreement are noted on the chart. I was personally present for the key portions of any procedures. I have documented in the chart those procedures where I was not present during the key portions. I have personally reviewed all images and agree with the resident's interpretation. I have reviewed the emergency nurses triage note. I agree with the chief complaint, past medical history, past surgical history, allergies, medications, social and family history as documented unless otherwise noted below. Documentation of the HPI, Physical Exam and Medical Decision Making performed by medical students or scribes is based on my personal performance of the HPI, PE and MDM. For Phys Assistant/ Nurse Practitioner cases/documentation I have had a face to face evaluation of this patient and have completed at least one if not all key elements of the E/M (history, physical exam, and MDM).  Additional findings are as noted.    For APC cases I have personally evaluated and examined the patient in conjunction with the APC and agree with the treatment plan and disposition of the patient as recorded by the APC.     Aguila Urias MD  Attending Emergency  Physician       Kerline Henry MD  03/05/21 5109

## 2021-03-17 ENCOUNTER — APPOINTMENT (OUTPATIENT)
Dept: GENERAL RADIOLOGY | Age: 26
End: 2021-03-17
Payer: MEDICARE

## 2021-03-17 ENCOUNTER — HOSPITAL ENCOUNTER (EMERGENCY)
Age: 26
Discharge: HOME | End: 2021-03-17
Attending: EMERGENCY MEDICINE
Payer: MEDICARE

## 2021-03-17 VITALS
HEART RATE: 108 BPM | RESPIRATION RATE: 20 BRPM | DIASTOLIC BLOOD PRESSURE: 104 MMHG | TEMPERATURE: 96.8 F | HEIGHT: 69 IN | SYSTOLIC BLOOD PRESSURE: 144 MMHG | WEIGHT: 220 LBS | BODY MASS INDEX: 32.58 KG/M2 | OXYGEN SATURATION: 96 %

## 2021-03-17 DIAGNOSIS — M25.561 CHRONIC PAIN OF RIGHT KNEE: Primary | ICD-10-CM

## 2021-03-17 DIAGNOSIS — G89.29 CHRONIC PAIN OF RIGHT KNEE: Primary | ICD-10-CM

## 2021-03-17 PROCEDURE — 73562 X-RAY EXAM OF KNEE 3: CPT

## 2021-03-17 PROCEDURE — 6360000002 HC RX W HCPCS: Performed by: STUDENT IN AN ORGANIZED HEALTH CARE EDUCATION/TRAINING PROGRAM

## 2021-03-17 PROCEDURE — 99284 EMERGENCY DEPT VISIT MOD MDM: CPT

## 2021-03-17 PROCEDURE — 96372 THER/PROPH/DIAG INJ SC/IM: CPT

## 2021-03-17 RX ORDER — KETOROLAC TROMETHAMINE 30 MG/ML
30 INJECTION, SOLUTION INTRAMUSCULAR; INTRAVENOUS ONCE
Status: COMPLETED | OUTPATIENT
Start: 2021-03-17 | End: 2021-03-17

## 2021-03-17 RX ORDER — IBUPROFEN 600 MG/1
600 TABLET ORAL 4 TIMES DAILY PRN
Qty: 30 TABLET | Refills: 0 | Status: SHIPPED | OUTPATIENT
Start: 2021-03-17 | End: 2021-03-22

## 2021-03-17 RX ORDER — ACETAMINOPHEN 500 MG
1000 TABLET ORAL 3 TIMES DAILY
Qty: 30 TABLET | Refills: 0 | Status: SHIPPED | OUTPATIENT
Start: 2021-03-17 | End: 2021-09-15

## 2021-03-17 RX ADMIN — KETOROLAC TROMETHAMINE 30 MG: 30 INJECTION, SOLUTION INTRAMUSCULAR at 22:25

## 2021-03-18 NOTE — ED NOTES
Pt c/o rt knee pain states he had previous surgery 10 yrs ago for football injury. Pt denies injury fall or trauma to rt knee. Pt presents with slight swelling and tenderness to rt knee. No other complaints at this time. Call light in reach.      Daniela Torres RN  03/17/21 1089

## 2021-03-18 NOTE — ED PROVIDER NOTES
[facility]     Emergency Department     Faculty Note/ Attestation      Pt Name: Luma Collazo                                       MRN: [id number]  [place] 1995  Date of evaluation: 3/17/2021    Patients PCP:    No primary care provider on file. Attestation  I performed a history and physical examination of the patient and discussed management with the resident. I reviewed the residents note and agree with the documented findings and plan of care. Any areas of disagreement are noted on the chart. I was personally present for the key portions of any procedures. I have documented in the chart those procedures where I was not present during the key portions. I have reviewed the emergency nurses triage note. I agree with the chief complaint, past medical history, past surgical history, allergies, medications, social and family history as documented unless otherwise noted below. For Physician Assistant/ Nurse Practitioner cases/documentation I have personally evaluated this patient and have completed at least one if not all key elements of the E/M (history, physical exam, and MDM). Additional findings are as noted.       Initial Screens:        Joshua Coma Scale  Eye Opening: Spontaneous  Best Verbal Response: Oriented  Best Motor Response: Obeys commands  Joshua Coma Scale Score: 15    Vitals:    Vitals:    03/17/21 2209 03/17/21 2214 03/17/21 2216   BP:   (!) 180/92   Pulse:  129    Resp:  20    Temp: 96.8 °F (36 °C)     TempSrc: Skin     SpO2:  96%    Weight:  220 lb (99.8 kg)    Height:  5' 9\" (1.753 m)        CHIEF COMPLAINT       Chief Complaint   Patient presents with    Knee Pain     rt knee             DIAGNOSTIC RESULTS             RADIOLOGY:   XR KNEE RIGHT (3 VIEWS)    (Results Pending)         LABS:  Labs Reviewed - No data to display      EMERGENCY DEPARTMENT COURSE:     -------------------------  BP: (!) 180/92, Temp: 96.8 °F (36 °C), Pulse: 129, Resp: 21      Comments    19year-old with ACL repair multiple years ago, states has had pain since, mild effusion today with aching at night. He is ambulatory, he has full range of motion, no abnormalities on exam other than effusion. Followed up with his Ortho, he is not taking NSAIDs, he is not using a brace or doing any physical therapy. Recommend all these things, x-ray is complete and if normal will discharge him. He does seem a bit anxious, his blood pressure and pulse rate are up we will let him settle and reassess.     (Please note that portions of this note were completed with a voice recognition program.  Efforts were made to edit the dictations but occasionally words are mis-transcribed.)      Moran MD  Attending Emergency Physician         Michelle Whelan MD  03/17/21 4311

## 2021-03-18 NOTE — ED PROVIDER NOTES
[facility]   Emergency Department  Emergency Medicine Attending Sign-out     Care of Chloe Mace was assumed from previous attending Dr. Lissy Yates and is being seen for Knee Pain (rt knee)  . The patient's initial evaluation and plan have been discussed with the prior provider who initially evaluated the patient. Attestation  I was available and discussed any additional care issues that arose and coordinated the management plans with the resident(s) caring for the patient during my duty period. Any areas of disagreement with resident's documentation of care or procedures are noted on the chart. I was personally present for the key portions of any/all procedures, during my duty period. I have documented in the chart those procedures where I was not present during the key portions. BRIEF PATIENT SUMMARY/MDM COURSE PER INITIAL PROVIDER:   RECENT VITALS:     Temp: 96.8 °F (36 °C),  Pulse: 108, Resp: 20, BP: (!) 144/104, SpO2: 96 %    This patient is a 27 y.o. Male with for right knee pain. No new trauma. Effusion with no signs of overlying infection per initial attending. Awaiting xray and repeat HR as initial was tachycardia    DIAGNOSTICS/MEDICATIONS:     MEDICATIONS GIVEN:  ED Medication Orders (From admission, onward)    Start Ordered     Status Ordering Provider    03/17/21 2230 03/17/21 2222  ketorolac (TORADOL) injection 30 mg  ONCE      Last MAR action: Given - by Jennifer Yun on 03/17/21 at [address] [place] [address],  [address], [address] - No data to display    RADIOLOGY  Xr Knee Right (3 Views)    Result Date: 3/17/2021  EXAMINATION: THREE XRAY VIEWS OF THE RIGHT KNEE 3/17/2021 10:37 pm COMPARISON: None.  HISTORY: ORDERING SYSTEM PROVIDED HISTORY: Chronic knee pain, history of ACL repair TECHNOLOGIST PROVIDED HISTORY: Chronic knee pain, history of ACL repair Reason for Exam: c/o worsening chronic knee pain waking him up hx: ACL repair Acuity: Acute Type of Exam:

## 2021-03-19 ASSESSMENT — ENCOUNTER SYMPTOMS
EYE REDNESS: 0
EYE DISCHARGE: 0
ABDOMINAL PAIN: 0
COLOR CHANGE: 0
SHORTNESS OF BREATH: 0

## 2021-03-19 NOTE — ED PROVIDER NOTES
Yes     Types: Opiates , IV, Marijuana     Comment: fentanyl    Sexual activity: Not on file   Lifestyle    Physical activity     Days per week: Not on file     Minutes per session: Not on file    Stress: Not on file   Relationships    Social connections     Talks on phone: Not on file     Gets together: Not on file     Attends Confucianism service: Not on file     Active member of club or organization: Not on file     Attends meetings of clubs or organizations: Not on file     Relationship status: Not on file    Intimate partner violence     Fear of current or ex partner: Not on file     Emotionally abused: Not on file     Physically abused: Not on file     Forced sexual activity: Not on file   Other Topics Concern    Not on file   Social History Narrative    Not on file       No family history on file. Allergies:  Patient has no known allergies. Home Medications:  Prior to Admission medications    Medication Sig Start Date End Date Taking? Authorizing Provider   acetaminophen (TYLENOL) 500 MG tablet Take 2 tablets by mouth 3 times daily 3/17/21  Yes Eros Goodwin DO   ibuprofen (ADVIL;MOTRIN) 600 MG tablet Take 1 tablet by mouth 4 times daily as needed for Pain 3/17/21  Yes Eros Goodwin DO       REVIEW OF SYSTEMS    (2-9 systems for level 4, 10 or more for level 5)      Review of Systems   Constitutional: Negative for chills and fever. Eyes: Negative for discharge and redness. Respiratory: Negative for shortness of breath. Cardiovascular: Negative for chest pain. Gastrointestinal: Negative for abdominal pain. Genitourinary: Negative for dysuria. Musculoskeletal: Positive for arthralgias and joint swelling. Skin: Negative for color change and rash. Allergic/Immunologic: Negative for environmental allergies. Neurological: Negative for headaches. Psychiatric/Behavioral: Negative for agitation and confusion.        PHYSICAL EXAM   (up to 7 for level 4, 8 or more for level 5) INITIAL VITALS:    height is 5' 9\" (1.753 m) and weight is 220 lb (99.8 kg). His skin temperature is 96.8 °F (36 °C). His blood pressure is 144/104 (abnormal) and his pulse is 108. His respiration is 20 and oxygen saturation is 96%. Physical Exam  Vitals signs and nursing note reviewed. Constitutional:       Appearance: He is well-developed. HENT:      Head: Normocephalic and atraumatic. Nose: Nose normal.      Mouth/Throat:      Mouth: Mucous membranes are moist.   Eyes:      General: No scleral icterus. Conjunctiva/sclera: Conjunctivae normal.      Pupils: Pupils are equal, round, and reactive to light. Neck:      Musculoskeletal: Neck supple. Trachea: No tracheal deviation. Cardiovascular:      Rate and Rhythm: Normal rate and regular rhythm. Heart sounds: Normal heart sounds. No murmur. No friction rub. No gallop. Pulmonary:      Effort: Pulmonary effort is normal. No respiratory distress. Breath sounds: Normal breath sounds. No wheezing or rales. Abdominal:      General: Bowel sounds are normal. There is no distension. Palpations: Abdomen is soft. There is no mass. Tenderness: There is no abdominal tenderness. There is no guarding or rebound. Musculoskeletal: Normal range of motion. Comments: Full range of motion of right knee, however painful. PMS intact distally. Pulses 2/4. No pain on anterior posterior drawer or varus or valgus. Mild medial effusion, no redness or erythema. Able to bear weight. Skin:     General: Skin is warm and dry. Findings: No erythema or rash. Neurological:      Mental Status: He is alert and oriented to person, place, and time.    Psychiatric:         Behavior: Behavior normal.         DIFFERENTIAL  DIAGNOSIS     PLAN (LABS / IMAGING / EKG):  Orders Placed This Encounter   Procedures    XR KNEE RIGHT (3 VIEWS)       MEDICATIONS ORDERED:  Orders Placed This Encounter   Medications    ketorolac (TORADOL) injection 30 mg    acetaminophen (TYLENOL) 500 MG tablet     Sig: Take 2 tablets by mouth 3 times daily     Dispense:  30 tablet     Refill:  0    ibuprofen (ADVIL;MOTRIN) 600 MG tablet     Sig: Take 1 tablet by mouth 4 times daily as needed for Pain     Dispense:  30 tablet     Refill:  0       DDX: Meniscus versus ligamentous injury versus septic joint    Initial MDM/Plan: 27 y.o. male who presents with acute on chronic knee pain. Denies any recent injury however will get x-rays as patient has had no recent imaging. Low suspicion for septic joint or severe ligamentous injury. Will encourage patient follow-up closely with orthopedic surgeon or sports medicine doctor for continued follow-up as he has long history of ligamentous injury to this knee. DIAGNOSTIC RESULTS / EMERGENCY DEPARTMENT COURSE / MDM     LABS:  Labs Reviewed - No data to display      RADIOLOGY:  XR KNEE RIGHT (3 VIEWS)   Final Result   1. Moderate tricompartmental osteoarthritis involving the right knee with a   lateral compartment predominance. 2. No evidence of an acute fracture. EMERGENCY DEPARTMENT COURSE:  · Based on the low acuity of concerning symptoms and improvement of symptoms, patient will be discharged with follow up and prescription information listed in the Disposition section. · Patient states they will follow-up with primary care physician and/or return to the emergency department should they experience a change or worsening of symptoms. · Patient will be discharged with resources: summary of visit, instructions, follow-up information, prescriptions if necessary and clinics available. · Patient/ family instructed to read discharge paperwork. All of their questions and concerns were addressed. · Suspicion for any acute life-threatening processes is low. Patient voices understanding of plan.       PROCEDURES:  None    CONSULTS:  None    CRITICAL CARE:  Please see attending note    FINAL IMPRESSION 1. Chronic pain of right knee      =    DISPOSITION / PLAN     DISPOSITION Decision To Discharge 03/17/2021 11:13:49 PM        PATIENTREFERRED TO:  [address] [facility]  [phone]  Schedule an appointment as soon as possible for a visit         DISCHARGE MEDICATIONS:  Discharge Medication List as of 3/17/2021 11:25 PM          Clarissa Pedro DO  EmergencyMedicine Resident    (Please note that portions of this note were completed with a voice recognition program.  Efforts were made to edit the dictations but occasionally words are mis-transcribed.)       Clarissa Pedro DO  Resident  03/19/21 2331

## 2021-03-22 ENCOUNTER — HOSPITAL ENCOUNTER (EMERGENCY)
Age: 26
Discharge: HOME | End: 2021-03-22
Attending: EMERGENCY MEDICINE
Payer: MEDICARE

## 2021-03-22 VITALS
TEMPERATURE: 98.4 F | OXYGEN SATURATION: 100 % | DIASTOLIC BLOOD PRESSURE: 99 MMHG | HEIGHT: 71 IN | BODY MASS INDEX: 30.8 KG/M2 | HEART RATE: 120 BPM | RESPIRATION RATE: 16 BRPM | WEIGHT: 220 LBS | SYSTOLIC BLOOD PRESSURE: 155 MMHG

## 2021-03-22 DIAGNOSIS — M25.561 ACUTE PAIN OF RIGHT KNEE: ICD-10-CM

## 2021-03-22 DIAGNOSIS — Z76.0 ENCOUNTER FOR MEDICATION REFILL: Primary | ICD-10-CM

## 2021-03-22 PROCEDURE — 99283 EMERGENCY DEPT VISIT LOW MDM: CPT

## 2021-03-22 RX ORDER — 0.9 % SODIUM CHLORIDE 0.9 %
1000 INTRAVENOUS SOLUTION INTRAVENOUS ONCE
Status: DISCONTINUED | OUTPATIENT
Start: 2021-03-22 | End: 2021-03-22

## 2021-03-22 RX ORDER — METHYLPREDNISOLONE SODIUM SUCCINATE 125 MG/2ML
125 INJECTION, POWDER, LYOPHILIZED, FOR SOLUTION INTRAMUSCULAR; INTRAVENOUS ONCE
Status: DISCONTINUED | OUTPATIENT
Start: 2021-03-22 | End: 2021-03-22

## 2021-03-22 RX ORDER — DIPHENHYDRAMINE HYDROCHLORIDE 50 MG/ML
50 INJECTION INTRAMUSCULAR; INTRAVENOUS EVERY 6 HOURS PRN
Status: DISCONTINUED | OUTPATIENT
Start: 2021-03-22 | End: 2021-03-22

## 2021-03-22 RX ORDER — IBUPROFEN 600 MG/1
600 TABLET ORAL EVERY 6 HOURS PRN
Qty: 20 TABLET | Refills: 0 | Status: SHIPPED | OUTPATIENT
Start: 2021-03-22 | End: 2021-09-15

## 2021-03-22 ASSESSMENT — ENCOUNTER SYMPTOMS
WHEEZING: 0
ABDOMINAL PAIN: 0
SHORTNESS OF BREATH: 0
NAUSEA: 0
BACK PAIN: 0
DIARRHEA: 0
VOMITING: 0
CONSTIPATION: 0
COUGH: 0

## 2021-03-22 ASSESSMENT — PAIN DESCRIPTION - DESCRIPTORS: DESCRIPTORS: SHARP

## 2021-03-22 ASSESSMENT — PAIN DESCRIPTION - ORIENTATION: ORIENTATION: RIGHT

## 2021-03-22 ASSESSMENT — PAIN DESCRIPTION - PAIN TYPE: TYPE: ACUTE PAIN

## 2021-03-22 NOTE — ED NOTES
Pt arrived to ed after being evaluated from knee pain several days ago. Pt reports having insurance problems and cannot fill prescriptions. Pt requesting motrin. Pt is alert, oriented speaking clearly. Vitals presented tachycardia in 120s pt reports just smoking vape quickly before walking into room. Pt denies heart racing, chest pain shortness of breath.       Debbie Steele RN  03/22/21 0020
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
## 2021-08-04 ENCOUNTER — HOSPITAL ENCOUNTER (EMERGENCY)
Facility: CLINIC | Age: 26
Discharge: NURSING, REHAB OR LONG-TERM CARE | End: 2021-08-04
Attending: SPECIALIST
Payer: MEDICARE

## 2021-08-04 VITALS
DIASTOLIC BLOOD PRESSURE: 122 MMHG | SYSTOLIC BLOOD PRESSURE: 158 MMHG | TEMPERATURE: 98.9 F | HEART RATE: 104 BPM | RESPIRATION RATE: 19 BRPM | OXYGEN SATURATION: 95 %

## 2021-08-04 DIAGNOSIS — Z71.1 NO PROBLEM, FEARED COMPLAINT UNFOUNDED: ICD-10-CM

## 2021-08-04 DIAGNOSIS — Z76.89 ENCOUNTER FOR ASSESSMENT OF ALCOHOL AND DRUG USE: Primary | ICD-10-CM

## 2021-08-04 LAB
ABSOLUTE EOS #: 0.1 K/UL (ref 0–0.4)
ABSOLUTE IMMATURE GRANULOCYTE: NORMAL K/UL (ref 0–0.3)
ABSOLUTE LYMPH #: 2.2 K/UL (ref 1–4.8)
ABSOLUTE MONO #: 0.8 K/UL (ref 0.1–1.2)
ACETAMINOPHEN LEVEL: <5 UG/ML (ref 10–30)
AMPHETAMINE SCREEN URINE: NEGATIVE
ANION GAP: 10 MMOL/L (ref 7–16)
BARBITURATE SCREEN URINE: NEGATIVE
BASOPHILS # BLD: 0 % (ref 0–2)
BASOPHILS ABSOLUTE: 0 K/UL (ref 0–0.2)
BENZODIAZEPINE SCREEN, URINE: NEGATIVE
BUPRENORPHINE URINE: NORMAL
CANNABINOID SCREEN URINE: NEGATIVE
COCAINE METABOLITE, URINE: NEGATIVE
DIFFERENTIAL TYPE: NORMAL
EKG ATRIAL RATE: 101 BPM
EKG P AXIS: 47 DEGREES
EKG P-R INTERVAL: 154 MS
EKG Q-T INTERVAL: 342 MS
EKG QRS DURATION: 96 MS
EKG QTC CALCULATION (BAZETT): 443 MS
EKG R AXIS: 49 DEGREES
EKG T AXIS: 47 DEGREES
EKG VENTRICULAR RATE: 101 BPM
EOSINOPHILS RELATIVE PERCENT: 1 % (ref 1–4)
ETHANOL PERCENT: <0.01 %
ETHANOL: <10 MG/DL
GFR NON-AFRICAN AMERICAN: ABNORMAL ML/MIN
GFR SERPL CREATININE-BSD FRML MDRD: ABNORMAL ML/MIN
GFR SERPL CREATININE-BSD FRML MDRD: ABNORMAL ML/MIN/{1.73_M2}
GLUCOSE BLD-MCNC: 101 MG/DL (ref 74–100)
HCT VFR BLD CALC: 47.7 % (ref 41–53)
HEMOGLOBIN: 16.1 G/DL (ref 13.5–17.5)
IMMATURE GRANULOCYTES: NORMAL %
LYMPHOCYTES # BLD: 24 % (ref 24–44)
MCH RBC QN AUTO: 28.6 PG (ref 26–34)
MCHC RBC AUTO-ENTMCNC: 33.7 G/DL (ref 31–37)
MCV RBC AUTO: 85 FL (ref 80–100)
MDMA URINE: NORMAL
METHADONE SCREEN, URINE: NEGATIVE
METHAMPHETAMINE, URINE: NORMAL
MONOCYTES # BLD: 9 % (ref 2–11)
NRBC AUTOMATED: NORMAL PER 100 WBC
OPIATES, URINE: NEGATIVE
OXYCODONE SCREEN URINE: NEGATIVE
PDW BLD-RTO: 14.7 % (ref 12.5–15.4)
PHENCYCLIDINE, URINE: NEGATIVE
PLATELET # BLD: 212 K/UL (ref 140–450)
PLATELET ESTIMATE: NORMAL
PMV BLD AUTO: 7.6 FL (ref 6–12)
POC BUN: 16 MG/DL (ref 8–26)
POC CHLORIDE: 106 MMOL/L (ref 98–107)
POC CREATININE: 1.37 MG/DL (ref 0.51–1.19)
POC HEMATOCRIT: 48 % (ref 41–53)
POC HEMOGLOBIN: 16.3 G/DL (ref 13.5–17.5)
POC IONIZED CALCIUM: 1.11 MMOL/L (ref 1.15–1.33)
POC POTASSIUM: 4.3 MMOL/L (ref 3.5–4.5)
POC SODIUM: 143 MMOL/L (ref 138–146)
POC TCO2: 28 MMOL/L (ref 22–30)
PROPOXYPHENE, URINE: NORMAL
RBC # BLD: 5.61 M/UL (ref 4.5–5.9)
RBC # BLD: NORMAL 10*6/UL
SALICYLATE LEVEL: <1 MG/DL (ref 3–10)
SEG NEUTROPHILS: 66 % (ref 36–66)
SEGMENTED NEUTROPHILS ABSOLUTE COUNT: 5.9 K/UL (ref 1.8–7.7)
TEST INFORMATION: NORMAL
TRICYCLIC ANTIDEPRESSANTS, UR: NORMAL
WBC # BLD: 9.1 K/UL (ref 3.5–11)
WBC # BLD: NORMAL 10*3/UL

## 2021-08-04 PROCEDURE — 80143 DRUG ASSAY ACETAMINOPHEN: CPT

## 2021-08-04 PROCEDURE — 85025 COMPLETE CBC W/AUTO DIFF WBC: CPT

## 2021-08-04 PROCEDURE — 80051 ELECTROLYTE PANEL: CPT

## 2021-08-04 PROCEDURE — 85014 HEMATOCRIT: CPT

## 2021-08-04 PROCEDURE — 99284 EMERGENCY DEPT VISIT MOD MDM: CPT

## 2021-08-04 PROCEDURE — 84520 ASSAY OF UREA NITROGEN: CPT

## 2021-08-04 PROCEDURE — 82330 ASSAY OF CALCIUM: CPT

## 2021-08-04 PROCEDURE — 80179 DRUG ASSAY SALICYLATE: CPT

## 2021-08-04 PROCEDURE — 82947 ASSAY GLUCOSE BLOOD QUANT: CPT

## 2021-08-04 PROCEDURE — 36415 COLL VENOUS BLD VENIPUNCTURE: CPT

## 2021-08-04 PROCEDURE — 93005 ELECTROCARDIOGRAM TRACING: CPT | Performed by: SPECIALIST

## 2021-08-04 PROCEDURE — 80307 DRUG TEST PRSMV CHEM ANLYZR: CPT

## 2021-08-04 PROCEDURE — 99285 EMERGENCY DEPT VISIT HI MDM: CPT

## 2021-08-04 PROCEDURE — 82565 ASSAY OF CREATININE: CPT

## 2021-08-04 PROCEDURE — G0480 DRUG TEST DEF 1-7 CLASSES: HCPCS

## 2021-08-04 RX ORDER — GABAPENTIN 300 MG/1
300 CAPSULE ORAL 3 TIMES DAILY
Status: ON HOLD | COMMUNITY
End: 2021-09-22 | Stop reason: HOSPADM

## 2021-08-04 RX ORDER — TRAZODONE HYDROCHLORIDE 100 MG/1
100 TABLET ORAL NIGHTLY
COMMUNITY
End: 2022-03-11

## 2021-08-04 RX ORDER — BUSPIRONE HYDROCHLORIDE 5 MG/1
5 TABLET ORAL DAILY
COMMUNITY
End: 2022-03-11

## 2021-08-04 RX ORDER — BUPROPION HYDROCHLORIDE 150 MG/1
150 TABLET ORAL EVERY MORNING
COMMUNITY
End: 2022-03-11

## 2021-08-04 RX ORDER — HYDROXYZINE PAMOATE 25 MG/1
25 CAPSULE ORAL 3 TIMES DAILY PRN
COMMUNITY
End: 2022-03-11

## 2021-08-04 NOTE — ED PROVIDER NOTES
[facility]  [address] [facility]  Phone: [phone]      Pt Name: Mary Ca  MRN: [id number]  [place] 1995  Date of evaluation: 8/4/2021      CHIEF COMPLAINT       Chief Complaint   Patient presents with   [address]         HISTORY OF PRESENT ILLNESS    Mary Ca is a 27 y.o. male who presents   Chief Complaint   Patient presents with   [address] Problem   . 40-year-old male patient presents to the emergency department brought in via EMS from [facility] recovery and rehab center for evaluation of possible substance abuse. Patient has history of substance abuse including fentanyl, Percocet, cocaine, marijuana and intravenous heroin in the past.  He was admitted at [facility] recently for about 2 days and then discharged to rehab center at [address] [place] [address] for recovery 2 days ago. Patient states he was feeling very tired, fatigued and felt asleep. He was very drowsy and sleepy and the staff was concerned whether patient has been using opiates and thus he is transferred to the emergency department. Upon arrival patient denies using any opiates tonight. He just feels anxious, very tired and fatigued. He denies any headache, chest pain, shortness of breath, palpitations or diaphoresis. He denies any abdominal pain, vomiting or diarrhea. REVIEW OF SYSTEMS       Review of Systems    All systems reviewed and negative unless noted in HPI. The patient denies fever or constitutional symptoms. Denies vision change. Denies any sore throat or rhinorrhea. Denies any neck pain or stiffness. Denies chest pain or shortness of breath. No nausea,  vomiting or diarrhea. Denies any dysuria. Denies urinary frequency or hematuria. Denies musculoskeletal injury or pain. Denies any weakness, numbness or focal neurologic deficit.     Denies any polyuria, polydypsia       PAST MEDICAL HISTORY    has no past medical history on file.    SURGICAL HISTORY      has a past surgical history that includes knee surgery; Appendectomy (01/08/2020); and laparoscopic appendectomy (N/A, 1/8/2020). CURRENT MEDICATIONS       Discharge Medication List as of 8/4/2021  2:55 AM      CONTINUE these medications which have NOT CHANGED    Details   sertraline (ZOLOFT) 50 MG tablet Take 50 mg by mouth nightlyHistorical Med      busPIRone (BUSPAR) 5 MG tablet Take 5 mg by mouth dailyHistorical Med      buPROPion (WELLBUTRIN XL) 150 MG extended release tablet Take 150 mg by mouth every morningHistorical Med      gabapentin (NEURONTIN) 300 MG capsule Take 300 mg by mouth 3 times daily. Historical Med      traZODone (DESYREL) 100 MG tablet Take 100 mg by mouth nightlyHistorical Med      hydrOXYzine (VISTARIL) 25 MG capsule Take 25 mg by mouth 3 times daily as needed for AnxietyHistorical Med      ibuprofen (IBU) 600 MG tablet Take 1 tablet by mouth every 6 hours as needed for Pain, Disp-20 tablet, R-0Print      diclofenac sodium (VOLTAREN) 1 % GEL Apply topically 2 times daily for 6 days, Topical, 2 TIMES DAILY Starting Mon 3/22/2021, Until Sun 3/28/2021, For 6 days, Disp-50 g, R-0, Print      acetaminophen (TYLENOL) 500 MG tablet Take 2 tablets by mouth 3 times daily, Disp-30 tablet, R-0Print             ALLERGIES     has No Known Allergies. FAMILY HISTORY     has no family status information on file. family history is not on file. SOCIAL HISTORY      reports that he has been smoking. He has never used smokeless tobacco. He reports previous drug use. Drugs: Opiates , IV, Marijuana, and Cocaine. He reports that he does not drink alcohol. PHYSICAL EXAM     INITIAL VITALS:  oral temperature is 98.9 °F (37.2 °C). His blood pressure is 158/122 (abnormal) and his pulse is 104. His respiration is 19 and oxygen saturation is 95%. Physical Exam  Vitals and nursing note reviewed. Constitutional:       Appearance: He is well-developed.    HENT:      Head: Normocephalic and atraumatic. Nose: Nose normal.   Eyes:      Extraocular Movements: Extraocular movements intact. Pupils: Pupils are equal, round, and reactive to light. Cardiovascular:      Rate and Rhythm: Normal rate and regular rhythm. Heart sounds: Normal heart sounds. No murmur heard. Pulmonary:      Effort: Pulmonary effort is normal. No respiratory distress. Breath sounds: Normal breath sounds. Abdominal:      General: Bowel sounds are normal. There is no distension. Palpations: Abdomen is soft. Tenderness: There is no abdominal tenderness. Musculoskeletal:      Cervical back: Normal range of motion and neck supple. Skin:     General: Skin is warm and dry. Neurological:      General: No focal deficit present. Mental Status: He is alert and oriented to person, place, and time. DIFFERENTIAL DIAGNOSIS/ MDM:     Patient is transferred to the emergency department for evaluation of possible substance abuse. Will obtain EKG, CBC, chemistries, urine drug screen, alcohol level, salicylate and acetaminophen level. DIAGNOSTIC RESULTS     EKG: All EKG's are interpreted by the Emergency Department Physician who either signs or Co-signs this chart in the absence of a cardiologist.    EKG Interpretation    Interpreted by emergency department physician    Rhythm: Sinus rhythm, but Tachycardic  Rate: Tachycardic  Axis: normal  Conduction: normal  ST Segments: no acute change  T Waves: no acute change  Q Waves: no acute change    Clinical Impression: Sinus Tachycardia. RADIOLOGY:   I reviewed the radiologist interpretations:  No orders to display       No results found.         LABS:  Labs Reviewed   SALICYLATE LEVEL - Abnormal; Notable for the following components:       Result Value    Salicylate Lvl <1 (*)     All other components within normal limits   ACETAMINOPHEN LEVEL - Abnormal; Notable for the following components:    Acetaminophen Level <5 (*) All other components within normal limits   CALCIUM, IONIC (POC) - Abnormal; Notable for the following components:    POC Ionized Calcium 1.11 (*)     All other components within normal limits   CREATININE W/GFR POINT OF CARE - Abnormal; Notable for the following components:    POC Creatinine 1.37 (*)     All other components within normal limits   POCT GLUCOSE - Abnormal; Notable for the following components:    POC Glucose 101 (*)     All other components within normal limits   URINE DRUG SCREEN   ETHANOL   CBC WITH AUTO DIFFERENTIAL   ELECTROLYTES PLUS   HGB/HCT   UREA N (POC)       I reviewed all the lab results, serum creatinine is 1.37 and ionized calcium is 1.11    EMERGENCY DEPARTMENT COURSE:   Vitals:    Vitals:    08/04/21 0005 08/04/21 0123   BP: (!) 158/122    Pulse: 104    Resp: 19    Temp:  98.9 °F (37.2 °C)   TempSrc:  Oral   SpO2: 95%      -------------------------  BP: (!) 158/122, Temp: 98.9 °F (37.2 °C), Pulse: 104, Resp: 19    No orders of the defined types were placed in this encounter. During the emergency department course, patient was given oral fluids which he tolerated well. He has remained alert and oriented x3. He has been feeling drowsy intermittently but he is easily arousable. He is ambulatory in the hallway and is essentially asymptomatic. Plan is to discharge the patient with instructions drink plenty of oral fluids, continue current medications, follow-up with PCP, return if worse. The patient and significant other understands that at this time there is no evidence for a more malignant underlying process, but also understands that early in the process of an illness or injury, an emergency department workup can be falsely reassuring. Routine discharge counseling was given, and it is understood that worsening, changing or persistent symptoms should prompt an immediate call or follow up with their primary physician or return to the emergency department.  The importance of appropriate follow up was also discussed. I have reviewed the disposition diagnosis. I have answered the questions and given discharge instructions. There was voiced understanding of these instructions and no further questions or complaints. I have reviewed the disposition diagnosis with the patient and or their family/guardian. I have answered their questions and given discharge instructions. They voiced understanding of these instructions and did not have any further questions or complaints. Re-evaluation Notes    Patient is resting comfortably and does not appear to be in any pain or distress      PROCEDURES:  None    FINAL IMPRESSION      1. Encounter for assessment of alcohol and drug use    2. No problem, feared complaint unfounded          DISPOSITION/PLAN   DISPOSITION Decision To Discharge 08/04/2021 02:52:52 AM      Condition on Disposition    Stable    PATIENT REFERRED TO:  Call 419-same-day for follow up    Call in 2 days  For reevaluation of current symptoms    [facility] ED  / [address]   [phone]    If symptoms worsen      DISCHARGE MEDICATIONS:  Discharge Medication List as of 8/4/2021  2:55 AM          (Please note that portions of this note were completed with a voice recognition program.  Efforts were made to edit the dictations but occasionally words are mis-transcribed.)    Marybeth Koch MD,, MD, F.A.C.E.P.   Attending Emergency Physician     Marybeth Koch MD  08/04/21 9137

## 2021-09-15 ENCOUNTER — APPOINTMENT (OUTPATIENT)
Dept: GENERAL RADIOLOGY | Age: 26
End: 2021-09-15
Payer: MEDICARE

## 2021-09-15 ENCOUNTER — HOSPITAL ENCOUNTER (EMERGENCY)
Age: 26
Discharge: HOME | End: 2021-09-15
Attending: EMERGENCY MEDICINE
Payer: MEDICARE

## 2021-09-15 VITALS
DIASTOLIC BLOOD PRESSURE: 100 MMHG | RESPIRATION RATE: 16 BRPM | OXYGEN SATURATION: 98 % | TEMPERATURE: 98.4 F | HEART RATE: 110 BPM | SYSTOLIC BLOOD PRESSURE: 149 MMHG

## 2021-09-15 DIAGNOSIS — S42.491G OTHER CLOSED DISPLACED FRACTURE OF DISTAL END OF RIGHT HUMERUS WITH DELAYED HEALING, SUBSEQUENT ENCOUNTER: Primary | ICD-10-CM

## 2021-09-15 PROCEDURE — 73110 X-RAY EXAM OF WRIST: CPT

## 2021-09-15 PROCEDURE — 99283 EMERGENCY DEPT VISIT LOW MDM: CPT

## 2021-09-15 PROCEDURE — 73080 X-RAY EXAM OF ELBOW: CPT

## 2021-09-15 PROCEDURE — 73060 X-RAY EXAM OF HUMERUS: CPT

## 2021-09-15 PROCEDURE — 6370000000 HC RX 637 (ALT 250 FOR IP): Performed by: STUDENT IN AN ORGANIZED HEALTH CARE EDUCATION/TRAINING PROGRAM

## 2021-09-15 RX ORDER — OXYCODONE HYDROCHLORIDE 5 MG/1
5 TABLET ORAL ONCE
Status: COMPLETED | OUTPATIENT
Start: 2021-09-15 | End: 2021-09-15

## 2021-09-15 RX ORDER — ACETAMINOPHEN 325 MG/1
650 TABLET ORAL EVERY 6 HOURS PRN
Qty: 60 TABLET | Refills: 0 | Status: ON HOLD | OUTPATIENT
Start: 2021-09-15 | End: 2021-09-22 | Stop reason: HOSPADM

## 2021-09-15 RX ORDER — OXYCODONE HYDROCHLORIDE 5 MG/1
5 TABLET ORAL EVERY 6 HOURS PRN
Qty: 12 TABLET | Refills: 0 | Status: SHIPPED | OUTPATIENT
Start: 2021-09-15 | End: 2021-09-18

## 2021-09-15 RX ORDER — CYCLOBENZAPRINE HCL 5 MG
5 TABLET ORAL 2 TIMES DAILY PRN
Qty: 20 TABLET | Refills: 0 | Status: ON HOLD | OUTPATIENT
Start: 2021-09-15 | End: 2021-09-22 | Stop reason: HOSPADM

## 2021-09-15 RX ORDER — IBUPROFEN 200 MG
400 TABLET ORAL EVERY 6 HOURS PRN
Qty: 60 TABLET | Refills: 0 | Status: SHIPPED | OUTPATIENT
Start: 2021-09-15 | End: 2021-09-21 | Stop reason: SDUPTHER

## 2021-09-15 RX ORDER — OXYCODONE HYDROCHLORIDE 5 MG/1
10 TABLET ORAL ONCE
Status: COMPLETED | OUTPATIENT
Start: 2021-09-15 | End: 2021-09-15

## 2021-09-15 RX ADMIN — OXYCODONE HYDROCHLORIDE 5 MG: 5 TABLET ORAL at 23:05

## 2021-09-15 RX ADMIN — OXYCODONE HYDROCHLORIDE 10 MG: 5 TABLET ORAL at 22:11

## 2021-09-15 ASSESSMENT — PAIN DESCRIPTION - LOCATION: LOCATION: ARM

## 2021-09-15 ASSESSMENT — PAIN DESCRIPTION - ORIENTATION: ORIENTATION: RIGHT;UPPER

## 2021-09-15 ASSESSMENT — ENCOUNTER SYMPTOMS
SORE THROAT: 0
VOMITING: 0
ABDOMINAL PAIN: 0
DIARRHEA: 0
SHORTNESS OF BREATH: 0
RHINORRHEA: 0
NAUSEA: 0

## 2021-09-15 ASSESSMENT — PAIN SCALES - GENERAL
PAINLEVEL_OUTOF10: 10
PAINLEVEL_OUTOF10: 10
PAINLEVEL_OUTOF10: 9

## 2021-09-15 ASSESSMENT — PAIN DESCRIPTION - PROGRESSION: CLINICAL_PROGRESSION: GRADUALLY WORSENING

## 2021-09-16 NOTE — ED PROVIDER NOTES
laparoscopic appendectomy (N/A, 1/8/2020). 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/21    Sexual activity: Not on file   Other Topics Concern    Not on file   Social History Narrative    Not on file     Social Determinants of Health     Financial Resource Strain:     Difficulty of Paying Living Expenses:    Food Insecurity:     Worried About Running Out of Food in the Last Year:     [address] in the Last Year:    Transportation Needs:     Lack of Transportation (Medical):  Lack of Transportation (Non-Medical):    Physical Activity:     Days of Exercise per Week:     Minutes of Exercise per Session:    Stress:     Feeling of Stress :    Social Connections:     Frequency of Communication with Friends and Family:     Frequency of Social Gatherings with Friends and Family:     Attends [employer] Services:     Active Member of Clubs or Organizations:     Attends Club or Organization Meetings:     Marital Status:    Intimate Partner Violence:     Fear of Current or Ex-Partner:     Emotionally Abused:     Physically Abused:     Sexually Abused:        History reviewed. No pertinent family history. Allergies:  Patient has no known allergies. Home Medications:  Prior to Admission medications    Medication Sig Start Date End Date Taking?  Authorizing Provider   sertraline (ZOLOFT) 50 MG tablet Take 50 mg by mouth nightly    Historical Provider, MD   busPIRone (BUSPAR) 5 MG tablet Take 5 mg by mouth daily    Historical Provider, MD   buPROPion (WELLBUTRIN XL) 150 MG extended release tablet Take 150 mg by mouth every morning    Historical Provider, MD   gabapentin (NEURONTIN) 300 MG capsule Take 300 mg by mouth 3 times daily. Historical Provider, MD   traZODone (DESYREL) 100 MG tablet Take 100 mg by mouth nightly    Historical Provider, MD   hydrOXYzine (VISTARIL) 25 MG capsule Take 25 mg by mouth 3 times daily as needed for Anxiety    Historical Provider, MD   ibuprofen (IBU) 600 MG tablet Take 1 tablet by mouth every 6 hours as needed for Pain 3/22/21 4/11/21  Williams Vargas DO   diclofenac sodium (VOLTAREN) 1 % GEL Apply topically 2 times daily for 6 days 3/22/21 3/28/21  Williams Vargas DO   acetaminophen (TYLENOL) 500 MG tablet Take 2 tablets by mouth 3 times daily 3/17/21   Radha Hill DO       REVIEW OF SYSTEMS    (2-9 systems for level 4, 10 or more for level 5)      Review of Systems   Constitutional: Negative for chills, diaphoresis, fatigue and fever. HENT: Negative for congestion, rhinorrhea and sore throat. Respiratory: Negative for shortness of breath. Cardiovascular: Negative for chest pain. Gastrointestinal: Negative for abdominal pain, diarrhea, nausea and vomiting. Musculoskeletal: Positive for arthralgias. Negative for neck pain and neck stiffness. Skin: Negative for pallor and rash. Neurological: Negative for dizziness, syncope, weakness, light-headedness and headaches. PHYSICAL EXAM   (up to 7 for level 4, 8 or more for level 5)      INITIAL VITALS:   BP (!) 149/100   Pulse 110   Temp 98.4 °F (36.9 °C) (Oral)   Resp 16   SpO2 98%     Physical Exam  Constitutional:       General: He is not in acute distress. Appearance: He is well-developed. He is not ill-appearing, toxic-appearing or diaphoretic.       Comments: Patient is alert oriented, openly communicative, appears to be in a moderate amount of discomfort   HENT:      Head: Normocephalic and ORDERED:  Orders Placed This Encounter   Medications    oxyCODONE (ROXICODONE) immediate release tablet 10 mg       DDX:     DIAGNOSTIC RESULTS / EMERGENCY DEPARTMENT COURSE / MDM   LAB RESULTS:  No results found for this visit on 09/15/21. IMPRESSION: 47-year-old male with a right humerus fracture, happened 7 days ago, has not followed up with orthopedics, will repeat imaging, pain management, consult orthopedics. No concern for compartment syndrome at this time. RADIOLOGY:  XR HUMERUS RIGHT (MIN 2 VIEWS)    Result Date: 9/15/2021  EXAMINATION: TWO XRAY VIEWS OF THE RIGHT HUMERUS 9/15/2021 6:52 pm COMPARISON: None. HISTORY: ORDERING SYSTEM PROVIDED HISTORY: humerus fracture TECHNOLOGIST PROVIDED HISTORY: humerus fracture Reason for Exam: fx of humerus x 1 week ago out of town Acuity: Acute Type of Exam: Ongoing FINDINGS: Oblique fracture of the distal humerus with posterior displacement. There is no evidence of dislocation. . The  joint spaces appear well maintained. The soft tissues are unremarkable. Oblique fracture of the distal humerus with posterior displacement     XR ELBOW RIGHT (MIN 3 VIEWS)    Result Date: 9/15/2021  EXAMINATION: XRAY VIEWS OF THE RIGHT WRIST; THREE XRAY VIEWS OF THE RIGHT ELBOW 9/15/2021 6:52 pm COMPARISON: None. HISTORY: ORDERING SYSTEM PROVIDED HISTORY: right wrist pain TECHNOLOGIST PROVIDED HISTORY: right wrist pain Acuity: Acute Type of Exam: Ongoing; ORDERING SYSTEM PROVIDED HISTORY: right elbow pain TECHNOLOGIST PROVIDED HISTORY: right elbow pain Acuity: Acute Type of Exam: Ongoing RIGHT WRIST FINDINGS: Carpal bones and alignment are maintained. Distal radius and ulna are intact. No acute fracture or dislocation. Normal wrist radiographs RIGHT ELBOW FINDINGS: Oblique fracture of the distal humerus with posterior displacement. There is no evidence of dislocation. . The  joint spaces appear well maintained. Soft tissue swelling.   5 mm linear radiopaque foreign body in the subcutaneous tissues of the ventral lateral lower arm IMPRESSION: Oblique fracture of the distal humerus with posterior displacement     XR WRIST RIGHT (MIN 3 VIEWS)    Result Date: 9/15/2021  EXAMINATION: XRAY VIEWS OF THE RIGHT WRIST; THREE XRAY VIEWS OF THE RIGHT ELBOW 9/15/2021 6:52 pm COMPARISON: None. HISTORY: ORDERING SYSTEM PROVIDED HISTORY: right wrist pain TECHNOLOGIST PROVIDED HISTORY: right wrist pain Acuity: Acute Type of Exam: Ongoing; ORDERING SYSTEM PROVIDED HISTORY: right elbow pain TECHNOLOGIST PROVIDED HISTORY: right elbow pain Acuity: Acute Type of Exam: Ongoing RIGHT WRIST FINDINGS: Carpal bones and alignment are maintained. Distal radius and ulna are intact. No acute fracture or dislocation. Normal wrist radiographs RIGHT ELBOW FINDINGS: Oblique fracture of the distal humerus with posterior displacement. There is no evidence of dislocation. . The  joint spaces appear well maintained. Soft tissue swelling. 5 mm linear radiopaque foreign body in the subcutaneous tissues of the ventral lateral lower arm IMPRESSION: Oblique fracture of the distal humerus with posterior displacement       EKG      All EKG's are interpreted by the Emergency Department Physician who either signs or Co-signs this chart in the absence of a cardiologist.    EMERGENCY DEPARTMENT COURSE:  Patient came to emergency department, HPI and physical exam were conducted. All nursing notes were reviewed. X-rays consistent with distal humerus fracture, no other fractures noted. Consulted with orthopedics who recommended outpatient management with follow-up with Dr. Lubna Waggoner. Patient remained stable emergency department with improving vital signs. Gave strict return precautions to the emergency department and discharge patient home. Recommended patient call and schedule an appointment with orthopedics in the morning for reevaluation.   Prescribed Tylenol, ibuprofen, Flexeril, short course of oxycodone for pain management. PROCEDURES:      CONSULTS:  IP CONSULT TO ORTHOPEDIC SURGERY    CRITICAL CARE:      FINAL IMPRESSION      1. Other closed displaced fracture of distal end of right humerus with delayed healing, subsequent encounter          DISPOSITION / [facility] [address] Discharge - Pending Orders Complete 09/15/2021 10:14:49 PM      PATIENT REFERRED TO:  No follow-up provider specified.     DISCHARGE MEDICATIONS:  New Prescriptions    No medications on file       Randy Mccormack MD  Emergency Medicine Resident    (Please note that portions of thisnote were completed with a voice recognition program.  Efforts were made to edit the dictations but occasionally words are mis-transcribed.)        Randy Mccormack MD  Resident  09/15/21 [id number]

## 2021-09-16 NOTE — ED PROVIDER NOTES
[facility]     Emergency Department     Faculty Attestation        I performed a history and physical examination of the patient and discussed management with the resident. I reviewed the residents note and agree with the documented findings and plan of care. Any areas of disagreement are noted on the chart. I was personally present for the key portions of any procedures. I have documented in the chart those procedures where I was not present during the key portions. I have reviewed the emergency nurses triage note. I agree with the chief complaint, past medical history, past surgical history, allergies, medications, social and family history as documented unless otherwise noted below. For mid-level providers such as nurse practitioners as well as physicians assistants:    I have personally seen and evaluated the patient. I find the patient's history and physical exam are consistent with NP/PA documentation. I agree with the care provided, treatment rendered, disposition, & follow-up plan. Additional findings are as noted. Vital Signs: BP (!) 149/100   Pulse 110   Temp 98.4 °F (36.9 °C) (Oral)   Resp 16   SpO2 98%   PCP:  No primary care provider on file. Pertinent Comments:     Patient broke his humerus while arm wrestling a week ago. He is right-hand dominant. He was placed in a splint. He complains of continued pain he was also follow-up with orthopedic surgeon but did not do so.   Splint was taken down his compartments are soft compressible he has +2 radial ulnar pulses and other wise is well-appearing will check x-rays discussed with Ortho, reassessment      Critical Care  None          Christiano Tobin MD    Attending Emergency Medicine Physician              Neelam Lainez MD  09/15/21 [id number]

## 2021-09-17 DIAGNOSIS — M79.601 PAIN OF RIGHT UPPER EXTREMITY: Primary | ICD-10-CM

## 2021-09-21 ENCOUNTER — APPOINTMENT (OUTPATIENT)
Dept: GENERAL RADIOLOGY | Age: 26
End: 2021-09-21
Payer: MEDICARE

## 2021-09-21 ENCOUNTER — HOSPITAL ENCOUNTER (EMERGENCY)
Age: 26
Discharge: HOME | End: 2021-09-21
Attending: EMERGENCY MEDICINE
Payer: MEDICARE

## 2021-09-21 VITALS
TEMPERATURE: 98.3 F | RESPIRATION RATE: 20 BRPM | WEIGHT: 220 LBS | HEIGHT: 71 IN | DIASTOLIC BLOOD PRESSURE: 90 MMHG | BODY MASS INDEX: 30.8 KG/M2 | SYSTOLIC BLOOD PRESSURE: 153 MMHG | OXYGEN SATURATION: 96 % | HEART RATE: 100 BPM

## 2021-09-21 DIAGNOSIS — M79.601 RIGHT ARM PAIN: Primary | ICD-10-CM

## 2021-09-21 PROCEDURE — 6360000002 HC RX W HCPCS: Performed by: STUDENT IN AN ORGANIZED HEALTH CARE EDUCATION/TRAINING PROGRAM

## 2021-09-21 PROCEDURE — 99283 EMERGENCY DEPT VISIT LOW MDM: CPT

## 2021-09-21 PROCEDURE — 96372 THER/PROPH/DIAG INJ SC/IM: CPT

## 2021-09-21 PROCEDURE — 73060 X-RAY EXAM OF HUMERUS: CPT

## 2021-09-21 PROCEDURE — 73090 X-RAY EXAM OF FOREARM: CPT

## 2021-09-21 PROCEDURE — 73080 X-RAY EXAM OF ELBOW: CPT

## 2021-09-21 PROCEDURE — 6370000000 HC RX 637 (ALT 250 FOR IP): Performed by: STUDENT IN AN ORGANIZED HEALTH CARE EDUCATION/TRAINING PROGRAM

## 2021-09-21 RX ORDER — LORAZEPAM 2 MG/ML
1 INJECTION INTRAMUSCULAR ONCE
Status: DISCONTINUED | OUTPATIENT
Start: 2021-09-21 | End: 2021-09-21

## 2021-09-21 RX ORDER — LORAZEPAM 2 MG/ML
0.5 INJECTION INTRAMUSCULAR ONCE
Status: DISCONTINUED | OUTPATIENT
Start: 2021-09-21 | End: 2021-09-21

## 2021-09-21 RX ORDER — MORPHINE SULFATE 4 MG/ML
4 INJECTION, SOLUTION INTRAMUSCULAR; INTRAVENOUS ONCE
Status: DISCONTINUED | OUTPATIENT
Start: 2021-09-21 | End: 2021-09-21

## 2021-09-21 RX ORDER — MORPHINE SULFATE 4 MG/ML
4 INJECTION, SOLUTION INTRAMUSCULAR; INTRAVENOUS ONCE
Status: COMPLETED | OUTPATIENT
Start: 2021-09-21 | End: 2021-09-21

## 2021-09-21 RX ORDER — PROPOFOL 10 MG/ML
200 INJECTION, EMULSION INTRAVENOUS
Status: DISCONTINUED | OUTPATIENT
Start: 2021-09-21 | End: 2021-09-21

## 2021-09-21 RX ORDER — OXYCODONE HYDROCHLORIDE 5 MG/1
5 TABLET ORAL EVERY 6 HOURS PRN
Qty: 9 TABLET | Refills: 0 | Status: ON HOLD | OUTPATIENT
Start: 2021-09-21 | End: 2021-09-22 | Stop reason: HOSPADM

## 2021-09-21 RX ORDER — IBUPROFEN 200 MG
800 TABLET ORAL EVERY 6 HOURS PRN
Qty: 60 TABLET | Refills: 0 | Status: ON HOLD | OUTPATIENT
Start: 2021-09-21 | End: 2021-09-22 | Stop reason: HOSPADM

## 2021-09-21 RX ORDER — HYDROCODONE BITARTRATE AND ACETAMINOPHEN 5; 325 MG/1; MG/1
1 TABLET ORAL ONCE
Status: COMPLETED | OUTPATIENT
Start: 2021-09-21 | End: 2021-09-21

## 2021-09-21 RX ORDER — KETOROLAC TROMETHAMINE 30 MG/ML
30 INJECTION, SOLUTION INTRAMUSCULAR; INTRAVENOUS ONCE
Status: COMPLETED | OUTPATIENT
Start: 2021-09-21 | End: 2021-09-21

## 2021-09-21 RX ADMIN — MORPHINE SULFATE 4 MG: 4 INJECTION INTRAVENOUS at 03:15

## 2021-09-21 RX ADMIN — KETOROLAC TROMETHAMINE 30 MG: 30 INJECTION, SOLUTION INTRAMUSCULAR; INTRAVENOUS at 03:15

## 2021-09-21 RX ADMIN — HYDROCODONE BITARTRATE AND ACETAMINOPHEN 1 TABLET: 5; 325 TABLET ORAL at 01:15

## 2021-09-21 ASSESSMENT — PAIN SCALES - GENERAL
PAINLEVEL_OUTOF10: 10

## 2021-09-21 ASSESSMENT — PAIN DESCRIPTION - FREQUENCY: FREQUENCY: CONTINUOUS

## 2021-09-21 ASSESSMENT — PAIN DESCRIPTION - ORIENTATION: ORIENTATION: RIGHT

## 2021-09-21 ASSESSMENT — ENCOUNTER SYMPTOMS
RHINORRHEA: 0
SHORTNESS OF BREATH: 0
ABDOMINAL PAIN: 0
COLOR CHANGE: 1

## 2021-09-21 ASSESSMENT — PAIN DESCRIPTION - DESCRIPTORS: DESCRIPTORS: PRESSURE

## 2021-09-21 ASSESSMENT — PAIN DESCRIPTION - LOCATION: LOCATION: ARM

## 2021-09-21 NOTE — ED PROVIDER NOTES
[facility] ED  Emergency Department Encounter  EmergencyMedicine Resident     Pt Name:Suraj Baker  MRN: [id number]  [place] 1995  Date of evaluation: 9/21/21  PCP:  No primary care provider on file. This patient was evaluated in the Emergency Department for symptoms described in the history of present illness. The patient was evaluated in the context of the global COVID-19 pandemic, which necessitated consideration that the patient might be at risk for infection with the SARS-CoV-2 virus that causes COVID-19. Institutional protocols and algorithms that pertain to the evaluation of patients at risk for COVID-19 are in a state of rapid change based on information released by regulatory bodies including the CDC and federal and state organizations. These policies and algorithms were followed during the patient's care in the ED. CHIEF COMPLAINT       Chief Complaint   Patient presents with    Arm Pain       HISTORY OF PRESENT ILLNESS  (Location/Symptom, Timing/Onset, Context/Setting, Quality, Duration, Modifying Factors, Severity.)      Vannessa Huerta is a 27 y.o. male who presents with numbness and tingling in the right arm. Patient centered on [phone] and was diagnosed with a closed displaced fracture of the distal end of the right humerus. Review of records  Patient was in nursing home, fractured his right humerus arm wrestling, humerus was displaced, patient underwent procedural sedation, reduced, splinted, instructed to follow-up with orthopedics, did not. PAST MEDICAL / SURGICAL / SOCIAL / FAMILY HISTORY      has no past medical history on file. Distal humerus fracture     has a past surgical history that includes knee surgery; Appendectomy (01/08/2020); and laparoscopic appendectomy (N/A, 1/8/2020).       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/21    Sexual activity: Not on file   Other Topics Concern    Not on file   Social History Narrative    Not on file     Social Determinants of Health     Financial Resource Strain:     Difficulty of Paying Living Expenses:    Food Insecurity:     Worried About Running Out of Food in the Last Year:     [address] in the Last Year:    Transportation Needs:     Lack of Transportation (Medical):  Lack of Transportation (Non-Medical):    Physical Activity:     Days of Exercise per Week:     Minutes of Exercise per Session:    Stress:     Feeling of Stress :    Social Connections:     Frequency of Communication with Friends and Family:     Frequency of Social Gatherings with Friends and Family:     Attends [employer] Services:     Active Member of Clubs or Organizations:     Attends Club or Organization Meetings:     Marital Status:    Intimate Partner Violence:     Fear of Current or Ex-Partner:     Emotionally Abused:     Physically Abused:     Sexually Abused:        No family history on file. Allergies:  Patient has no known allergies. Home Medications:  Prior to Admission medications    Medication Sig Start Date End Date Taking?  Authorizing Provider   acetaminophen (TYLENOL) 325 MG tablet Take 2 tablets by mouth every 6 hours as needed for Pain 9/15/21   Randy Mccormack MD   ibuprofen (ADVIL;MOTRIN) 200 MG tablet Take 2 tablets by mouth every 6 hours as needed for Pain or Fever 9/15/21   Randy Mccormack MD   cyclobenzaprine (FLEXERIL) 5 MG tablet Take 1 tablet by mouth 2 times daily as needed for Muscle spasms 9/15/21 9/25/21 Leilani Brown MD   sertraline (ZOLOFT) 50 MG tablet Take 50 mg by mouth nightly    Historical Provider, MD   busPIRone (BUSPAR) 5 MG tablet Take 5 mg by mouth daily    Historical Provider, MD   buPROPion (WELLBUTRIN XL) 150 MG extended release tablet Take 150 mg by mouth every morning    Historical Provider, MD   gabapentin (NEURONTIN) 300 MG capsule Take 300 mg by mouth 3 times daily. Historical Provider, MD   traZODone (DESYREL) 100 MG tablet Take 100 mg by mouth nightly    Historical Provider, MD   hydrOXYzine (VISTARIL) 25 MG capsule Take 25 mg by mouth 3 times daily as needed for Anxiety    Historical Provider, MD   diclofenac sodium (VOLTAREN) 1 % GEL Apply topically 2 times daily for 6 days 3/22/21 3/28/21  [place],        REVIEW OF SYSTEMS    (2-9 systems for level 4, 10 or more for level 5)      Review of Systems   Constitutional: Negative for fever. HENT: Negative for congestion and rhinorrhea. Respiratory: Negative for shortness of breath. Cardiovascular: Negative for chest pain. Gastrointestinal: Negative for abdominal pain. Musculoskeletal: Positive for arthralgias. Negative for gait problem and neck pain. Skin: Positive for color change and wound. Neurological: Positive for numbness. Psychiatric/Behavioral: Negative for agitation. PHYSICAL EXAM   (up to 7 for level 4, 8 or more for level 5)      INITIAL VITALS:   BP (!) 153/90   Pulse 100   Temp 98.3 °F (36.8 °C) (Oral)   Resp 20   Ht 5' 11\" (1.803 m)   Wt 220 lb (99.8 kg)   SpO2 96%   BMI 30.68 kg/m²     Physical Exam  Vitals and nursing note reviewed. Constitutional:       General: He is not in acute distress. Appearance: Normal appearance. He is not toxic-appearing. Comments: uncomfortable   HENT:      Head: Normocephalic and atraumatic. Right Ear: External ear normal.      Left Ear: External ear normal.      Mouth/Throat:      Pharynx: Oropharynx is clear.    Eyes: Conjunctiva/sclera: Conjunctivae normal.   Cardiovascular:      Rate and Rhythm: Normal rate. Pulses: Normal pulses. Pulmonary:      Effort: Pulmonary effort is normal.   Abdominal:      Palpations: Abdomen is soft. Skin:     Capillary Refill: Capillary refill takes less than 2 seconds. Findings: Bruising present. Neurological:      Mental Status: He is alert. Psychiatric:         Mood and Affect: Mood normal.         DIFFERENTIAL  DIAGNOSIS     PLAN (LABS / IMAGING / EKG):  Orders Placed This Encounter   Procedures    XR HUMERUS RIGHT (MIN 2 VIEWS)    XR ELBOW RIGHT (MIN 3 VIEWS)    XR RADIUS ULNA RIGHT (2 VIEWS)    XR HUMERUS RIGHT (MIN 2 VIEWS)    XR HUMERUS RIGHT (MIN 2 VIEWS)    Inpatient consult to Orthopedic Surgery       MEDICATIONS ORDERED:  Orders Placed This Encounter   Medications    HYDROcodone-acetaminophen (NORCO) 5-325 MG per tablet 1 tablet    DISCONTD: propofol injection 200 mg    DISCONTD: morphine injection 4 mg    DISCONTD: LORazepam (ATIVAN) injection 1 mg    DISCONTD: LORazepam (ATIVAN) injection 0.5 mg    morphine (PF) injection 4 mg    ketorolac (TORADOL) injection 30 mg    DISCONTD: ibuprofen (ADVIL;MOTRIN) 200 MG tablet     Sig: Take 4 tablets by mouth every 6 hours as needed for Pain or Fever     Dispense:  60 tablet     Refill:  0    DISCONTD: oxyCODONE (ROXICODONE) 5 MG immediate release tablet     Sig: Take 1 tablet by mouth every 6 hours as needed for Pain for up to 3 days. Intended supply: 3 days. Take lowest dose possible to manage pain     Dispense:  9 tablet     Refill:  0       DDX: humerus fracture    DIAGNOSTIC RESULTS / EMERGENCY DEPARTMENT COURSE / MDM   LAB RESULTS:  No results found for this visit on 09/21/21. IMPRESSION: Patient is alert oriented uncomfortable nontoxic 33-year-old male with a known distal humerus fracture with worsening pain and report of global numbness however he is able to feel when I touch each of his fingers. Pulses are intact distally departments are soft to be x-ray imaging consultation orthopedics analgesia. RADIOLOGY:  XR HUMERUS RIGHT (MIN 2 VIEWS)    Result Date: 9/22/2021  EXAMINATION: TWO XRAY VIEWS OF THE RIGHT HUMERUS 9/22/2021 3:09 pm COMPARISON: 09/21/2021. HISTORY: ORDERING SYSTEM PROVIDED HISTORY: Post-op in PACU TECHNOLOGIST PROVIDED HISTORY: Post-op in PACU Reason for Exam: post op FINDINGS: A dynamic compression plate with 8 fixation screws bridges a comminuted fracture involving the right distal humeral diaphysis near the metaphyseal diaphyseal junction. No measurable displacement, distraction, angulation or overriding were noted. No right AC joint separation or right glenohumeral dislocation. A dynamic compression plate with 8 fixation screws bridges a comminuted fracture involving the right distal humeral diaphysis near the metaphyseal diaphyseal junction. Post internal fixation, no measurable displacement, distraction, angulation or overriding were noted. XR HUMERUS RIGHT (MIN 2 VIEWS)    Result Date: 9/21/2021  EXAMINATION: TWO XRAY VIEWS OF THE RIGHT HUMERUS 9/21/2021 3:46 am COMPARISON: Elbow right minimum three views 09/21/2021 at 0131 hours. Humerus right minimum two views 09/21/2021 at 0130 hours. HISTORY: ORDERING SYSTEM PROVIDED HISTORY: post-reduction TECHNOLOGIST PROVIDED HISTORY: post-reduction Reason for Exam: Post reduction; ORDERING SYSTEM PROVIDED HISTORY: post splint TECHNOLOGIST PROVIDED HISTORY: post splint Reason for Exam: Post splint FINDINGS: Distal humeral diaphyseal distracted comminuted acute fracture is seen with medial angulation of the main distal fracture fragment identified which measures 25 degrees with distraction of the fracture fragments measuring 15 mm. . Surrounding distal humeral region subcutaneous soft tissue swelling is identified. Ventral casting material is noted in place without interval change in alignment of the distal humeral fracture.      No identified projecting overlying the antecubital fossa. Unclear if this is extrinsic to the patient or could represent a retained foreign body. There is volar soft tissue swelling identified along the forearm. X-rays of the elbow again demonstrate the linear radiopaque foreign body. Mild soft tissue swelling. No displaced fracture involving the elbow. Distal humeral fracture not significantly significant change in alignment. Soft tissue swelling along the forearm and elbow with linear radiopaque foreign body. Moderate swelling. No elbow fracture or forearm fracture. XR ELBOW RIGHT (MIN 3 VIEWS)    Result Date: 9/21/2021  EXAMINATION: TWO XRAY VIEWS OF THE RIGHT HUMERUS; TWO XRAY VIEWS OF THE RIGHT FOREARM; THREE XRAY VIEWS OF THE RIGHT ELBOW 9/21/2021 1:27 am COMPARISON: None. HISTORY: ORDERING SYSTEM PROVIDED HISTORY: pain TECHNOLOGIST PROVIDED HISTORY: pain Acuity: Unknown Type of Exam: Unknown FINDINGS: Within the mid humerus there is a focus of cortical thickening likely related to remote fracture or remote trauma. Within the distal humeral diaphysis, there is a distal diaphyseal fracture proximally 10 cm from the distal humerus. There is approximately 30 degrees of angulation with lateral displacement. Images of the forearm demonstrate mild soft tissue swelling. There is a radiopaque density identified projecting overlying the antecubital fossa. Unclear if this is extrinsic to the patient or could represent a retained foreign body. There is volar soft tissue swelling identified along the forearm. X-rays of the elbow again demonstrate the linear radiopaque foreign body. Mild soft tissue swelling. No displaced fracture involving the elbow. Distal humeral fracture not significantly significant change in alignment. Soft tissue swelling along the forearm and elbow with linear radiopaque foreign body. Moderate swelling. No elbow fracture or forearm fracture.      XR RADIUS ULNA RIGHT (2 VIEWS)    Result Date: 9/21/2021  EXAMINATION: TWO XRAY VIEWS OF THE RIGHT HUMERUS; TWO XRAY VIEWS OF THE RIGHT FOREARM; THREE XRAY VIEWS OF THE RIGHT ELBOW 9/21/2021 1:27 am COMPARISON: None. HISTORY: ORDERING SYSTEM PROVIDED HISTORY: pain TECHNOLOGIST PROVIDED HISTORY: pain Acuity: Unknown Type of Exam: Unknown FINDINGS: Within the mid humerus there is a focus of cortical thickening likely related to remote fracture or remote trauma. Within the distal humeral diaphysis, there is a distal diaphyseal fracture proximally 10 cm from the distal humerus. There is approximately 30 degrees of angulation with lateral displacement. Images of the forearm demonstrate mild soft tissue swelling. There is a radiopaque density identified projecting overlying the antecubital fossa. Unclear if this is extrinsic to the patient or could represent a retained foreign body. There is volar soft tissue swelling identified along the forearm. X-rays of the elbow again demonstrate the linear radiopaque foreign body. Mild soft tissue swelling. No displaced fracture involving the elbow. Distal humeral fracture not significantly significant change in alignment. Soft tissue swelling along the forearm and elbow with linear radiopaque foreign body. Moderate swelling. No elbow fracture or forearm fracture. FLUORO FOR SURGICAL PROCEDURES    Result Date: 9/22/2021  Radiology exam is complete. No Radiologist dictation. Please follow up with ordering provider. EKG      All EKG's are interpreted by the Emergency Department Physician who either signs or Co-signs this chart in the absence of a cardiologist.    EMERGENCY DEPARTMENT COURSE:  ED Course as of Sep 22 2044   Tue Sep 21, 2021   0146 Seen and evaluated, xr obtained.  Sling removed compartments soft global decreased sensation per pt report, pulses intact    [BG]   0221 Dispo pending ortho evaluation    [BG]      ED Course User Index  [BG] Noemí Richardson DO PROCEDURES:  none    CONSULTS:  None    CRITICAL CARE:      FINAL IMPRESSION      1. Right arm pain          DISPOSITION / [address] to [address] [place] [address] pending ortho evaluation      PATIENT REFERRED TO:  No follow-up provider specified.     DISCHARGE MEDICATIONS:  New Prescriptions    No medications on file       Charisma Carlson DO  Emergency Medicine Resident    (Please note that portions of thisnote were completed with a voice recognition program.  Efforts were made to edit the dictations but occasionally words are mis-transcribed.)        Charisma Carlson DO  Resident  09/22/21 [id number]

## 2021-09-21 NOTE — CONSULTS
Orthopedic Surgery Consult  (Dr. Gabby Richards)      CC/Reason for consult:  Right distal humeral shaft fracture    HPI:      The patient is a RHD 27 y.o. male with right arm pain. Pt was arm wrestling about 2 weeks prior while in alf when he felt a pop. Pt felt immediate pain to above the right elbow. XRs in outside hospital showed right distal humeral shaft fracture. Pt was splinted and instructed to follow up. Pt was seen in ED at [facility] on 9/15/21 with same injury, another splint placed. Today, pt reports holding his 2 month old daughter when he felt more pain to the right elbow and increased numbness to hand. Currently, pt reports pain controlled. Numbness to 4-5th fingers on right. Pt has not followed up with orthopedics at this point. Pt denies pain elsewhere. Past Medical History:    History reviewed. No pertinent past medical history. Past Surgical History:    Past Surgical History:   Procedure Laterality Date    APPENDECTOMY  01/08/2020    laprascopic     KNEE SURGERY      LAPAROSCOPIC APPENDECTOMY N/A 1/8/2020    APPENDECTOMY LAPAROSCOPIC performed by Michelle Maravilla MD at [place] [address]       Medications Prior to Admission:   Prior to Admission medications    Medication Sig Start Date End Date Taking?  Authorizing Provider   acetaminophen (TYLENOL) 325 MG tablet Take 2 tablets by mouth every 6 hours as needed for Pain 9/15/21   Adin Hampton MD   ibuprofen (ADVIL;MOTRIN) 200 MG tablet Take 2 tablets by mouth every 6 hours as needed for Pain or Fever 9/15/21   Adin Hampton MD   cyclobenzaprine (FLEXERIL) 5 MG tablet Take 1 tablet by mouth 2 times daily as needed for Muscle spasms 9/15/21 9/25/21  Adin aHmpton MD   sertraline (ZOLOFT) 50 MG tablet Take 50 mg by mouth nightly    Historical Provider, MD   busPIRone (BUSPAR) 5 MG tablet Take 5 mg by mouth daily    Historical Provider, MD   buPROPion (WELLBUTRIN XL) 150 MG extended release tablet Take 150 mg by mouth every morning    Historical Provider, MD   gabapentin (NEURONTIN) 300 MG capsule Take 300 mg by mouth 3 times daily. Historical Provider, MD   traZODone (DESYREL) 100 MG tablet Take 100 mg by mouth nightly    Historical Provider, MD   hydrOXYzine (VISTARIL) 25 MG capsule Take 25 mg by mouth 3 times daily as needed for Anxiety    Historical Provider, MD   diclofenac sodium (VOLTAREN) 1 % GEL Apply topically 2 times daily for 6 days 3/22/21 3/28/21  Ming Ludwig DO       Allergies:    Patient has no known allergies. Social History:   Social History     Socioeconomic History    Marital status: Single     Spouse name: None    Number of children: None    Years of education: None    Highest education level: None   Occupational History    None   Tobacco Use    Smoking status: Current Every Day Smoker    Smokeless tobacco: Never Used   Vaping Use    Vaping Use: Former   Substance and Sexual Activity    Alcohol use: No    Drug use: Not Currently     Types: Opiates , IV, Marijuana, Cocaine     Comment: fentanyl; sober 9/15/21    Sexual activity: None   Other Topics Concern    None   Social History Narrative    None     Social Determinants of Health     Financial Resource Strain:     Difficulty of Paying Living Expenses:    Food Insecurity:     Worried About Running Out of Food in the Last Year:     Ran Out of Food in the Last Year:    Transportation Needs:     Lack of Transportation (Medical):      Lack of Transportation (Non-Medical):    Physical Activity:     Days of Exercise per Week:     Minutes of Exercise per Session:    Stress:     Feeling of Stress :    Social Connections:     Frequency of Communication with Friends and Family:     Frequency of Social Gatherings with Friends and Family:     Attends Taoist Services:     Active Member of Clubs or Organizations:     Attends Club or Organization Meetings:     Marital Status:    Intimate Partner Violence:     Fear of Current or Ex-Partner:     Emotionally Abused:     Physically Abused:     Sexually Abused:        Family History:  History reviewed. No pertinent family history. REVIEW OF SYSTEMS:   General: No LOC  CV: No chest pain  Resp: No SOB  MSK: Positive for myalgias and joint pain. Neuro: Positive numbness, tingling  10 point ROS negative other than stated above      PHYSICAL EXAM:  Blood pressure (!) 153/90, pulse 100, temperature 98.3 °F (36.8 °C), temperature source Oral, resp. rate 20, height 5' 11\" (1.803 m), weight 220 lb (99.8 kg), SpO2 96 %. Gen: alert and oriented, NAD, cooperative  Head: normocephalic, atraumatic   Neck: supple  Chest: Non-labored breathing. Cardiovascular: regular rate, no dependent edema  Respiratory: Chest symmetric, no accessory muscle use    RUE: Swelling, ecchymosis distal humeral region with TTP. No TTP shoulder, forearm, wrist. . Skin intact. Compartments soft and compressible. Hand is warm and perfused. Axillary/MSC/Ulnar/Median/AIN/PIN motor intact. C4-T1 SILT. Radial pulse 2+ with BCR        LABS:  No results for input(s): WBC, HGB, HCT, PLT, INR, PTT, NA, K, BUN, CREATININE, GLUCOSE, SEDRATE, CRP in the last 72 hours. Invalid input(s): PT     Radiology:    -Multiple views right humerus/elbow demonstrates varus angulated, shortened distal humeral shaft fracture    A/P: 27 y.o. male being seen for right displaced distal humeral shaft fracture    -RUE close reduced and splinted in ED under IV pain meds/ativan  -Posterior long arm splint applied, keep clean and dry.  -Weight bearing: NWB RUE  -Maintain sling  -Pain control per ED  -Ok to DC.  Follow up with Dr. César Moise this week for surgical timing.  -Please page Ortho with any questions or concerns      Nicolasa Walton DO   Orthopedic Surgery Resident PGY-4  [facility] [address]

## 2021-09-21 NOTE — ED NOTES
Pt refused sedation and stated he has a baby at home to take care of       Renetta Menjivar, JENNIFER  09/21/21 [id number]

## 2021-09-21 NOTE — ED NOTES
Pt to ED via triage a/o x4 with c/o right arm pain. Pt stated he broke his arm 2 weeks ago and it was placed in a splint. Pt stated tonight when he was holding his 2 month old daughter she slid down and hit his arm and he feels like something is out of place and having increased pain. Pt denies any other injury. Vital complete, call light in reach.  Will continue to monitor      Anton Grimes RN  09/21/21 [id number]

## 2021-09-21 NOTE — ED PROVIDER NOTES
[facility] ED  Emergency Department  Emergency Medicine Resident Sign-out   Care of Sharan Sahu was assumed from Dr. Kalani Em and is being seen for Arm Pain  . The patient's initial evaluation and plan have been discussed with the prior provider who initially evaluated the patient. EMERGENCY DEPARTMENT COURSE / MEDICAL DECISION MAKING:       MEDICATIONS GIVEN:  Orders Placed This Encounter   Medications    HYDROcodone-acetaminophen (NORCO) 5-325 MG per tablet 1 tablet    DISCONTD: propofol injection 200 mg    DISCONTD: morphine injection 4 mg    DISCONTD: LORazepam (ATIVAN) injection 1 mg    DISCONTD: LORazepam (ATIVAN) injection 0.5 mg    morphine (PF) injection 4 mg    ketorolac (TORADOL) injection 30 mg       LABS / RADIOLOGY:     Labs Reviewed - No data to display    XR HUMERUS RIGHT (MIN 2 VIEWS)    Result Date: 9/21/2021  EXAMINATION: TWO XRAY VIEWS OF THE RIGHT HUMERUS; TWO XRAY VIEWS OF THE RIGHT FOREARM; THREE XRAY VIEWS OF THE RIGHT ELBOW 9/21/2021 1:27 am COMPARISON: None. HISTORY: ORDERING SYSTEM PROVIDED HISTORY: pain TECHNOLOGIST PROVIDED HISTORY: pain Acuity: Unknown Type of Exam: Unknown FINDINGS: Within the mid humerus there is a focus of cortical thickening likely related to remote fracture or remote trauma. Within the distal humeral diaphysis, there is a distal diaphyseal fracture proximally 10 cm from the distal humerus. There is approximately 30 degrees of angulation with lateral displacement. Images of the forearm demonstrate mild soft tissue swelling. There is a radiopaque density identified projecting overlying the antecubital fossa. Unclear if this is extrinsic to the patient or could represent a retained foreign body. There is volar soft tissue swelling identified along the forearm. X-rays of the elbow again demonstrate the linear radiopaque foreign body. Mild soft tissue swelling. No displaced fracture involving the elbow.      Distal humeral fracture not significantly significant change in alignment. Soft tissue swelling along the forearm and elbow with linear radiopaque foreign body. Moderate swelling. No elbow fracture or forearm fracture. XR ELBOW RIGHT (MIN 3 VIEWS)    Result Date: 9/15/2021  EXAMINATION: XRAY VIEWS OF THE RIGHT WRIST; THREE XRAY VIEWS OF THE RIGHT ELBOW 9/15/2021 6:52 pm COMPARISON: None. HISTORY: ORDERING SYSTEM PROVIDED HISTORY: right wrist pain TECHNOLOGIST PROVIDED HISTORY: right wrist pain Acuity: Acute Type of Exam: Ongoing; ORDERING SYSTEM PROVIDED HISTORY: right elbow pain TECHNOLOGIST PROVIDED HISTORY: right elbow pain Acuity: Acute Type of Exam: Ongoing RIGHT WRIST FINDINGS: Carpal bones and alignment are maintained. Distal radius and ulna are intact. No acute fracture or dislocation. Normal wrist radiographs RIGHT ELBOW FINDINGS: Oblique fracture of the distal humerus with posterior displacement. There is no evidence of dislocation. . The  joint spaces appear well maintained. Soft tissue swelling. 5 mm linear radiopaque foreign body in the subcutaneous tissues of the ventral lateral lower arm IMPRESSION: Oblique fracture of the distal humerus with posterior displacement     XR RADIUS ULNA RIGHT (2 VIEWS)    Result Date: 9/21/2021  EXAMINATION: TWO XRAY VIEWS OF THE RIGHT HUMERUS; TWO XRAY VIEWS OF THE RIGHT FOREARM; THREE XRAY VIEWS OF THE RIGHT ELBOW 9/21/2021 1:27 am COMPARISON: None. HISTORY: ORDERING SYSTEM PROVIDED HISTORY: pain TECHNOLOGIST PROVIDED HISTORY: pain Acuity: Unknown Type of Exam: Unknown FINDINGS: Within the mid humerus there is a focus of cortical thickening likely related to remote fracture or remote trauma. Within the distal humeral diaphysis, there is a distal diaphyseal fracture proximally 10 cm from the distal humerus. There is approximately 30 degrees of angulation with lateral displacement. Images of the forearm demonstrate mild soft tissue swelling.   There is a radiopaque density identified projecting overlying the antecubital fossa. Unclear if this is extrinsic to the patient or could represent a retained foreign body. There is volar soft tissue swelling identified along the forearm. X-rays of the elbow again demonstrate the linear radiopaque foreign body. Mild soft tissue swelling. No displaced fracture involving the elbow. Distal humeral fracture not significantly significant change in alignment. Soft tissue swelling along the forearm and elbow with linear radiopaque foreign body. Moderate swelling. No elbow fracture or forearm fracture. XR WRIST RIGHT (MIN 3 VIEWS)    Result Date: 9/15/2021  EXAMINATION: XRAY VIEWS OF THE RIGHT WRIST; THREE XRAY VIEWS OF THE RIGHT ELBOW 9/15/2021 6:52 pm COMPARISON: None. HISTORY: ORDERING SYSTEM PROVIDED HISTORY: right wrist pain TECHNOLOGIST PROVIDED HISTORY: right wrist pain Acuity: Acute Type of Exam: Ongoing; ORDERING SYSTEM PROVIDED HISTORY: right elbow pain TECHNOLOGIST PROVIDED HISTORY: right elbow pain Acuity: Acute Type of Exam: Ongoing RIGHT WRIST FINDINGS: Carpal bones and alignment are maintained. Distal radius and ulna are intact. No acute fracture or dislocation. Normal wrist radiographs RIGHT ELBOW FINDINGS: Oblique fracture of the distal humerus with posterior displacement. There is no evidence of dislocation. . The  joint spaces appear well maintained. Soft tissue swelling. 5 mm linear radiopaque foreign body in the subcutaneous tissues of the ventral lateral lower arm IMPRESSION: Oblique fracture of the distal humerus with posterior displacement       RECENT VITALS:     Temp: 98.3 °F (36.8 °C),  Pulse: 100, Resp: 20, BP: (!) 153/90, SpO2: 96 %      This patient is a 27 y.o. Male with humerus fracture, failure to follow-up outpatient. Back with repeat pain. Patient standing appointment with Ortho on Thursday. ED Course as of Sep 21 0317   Tue Sep 21, 2021   0146 Seen and evaluated, xr obtained.  Sling removed compartments soft global decreased sensation per pt report, pulses intact    [BG]   0221 Dispo pending ortho evaluation    [BG]      ED Course User Index  [BG] Mingo Jennings DO       OUTSTANDING TASKS / RECOMMENDATIONS:    1. Awaiting Ortho splint placement  2. Discharge home     FINAL IMPRESSION:     1. Right arm pain        DISPOSITION:         DISPOSITION:  [x]  Discharge   []  Transfer -    []  Admission -     []  Against Medical Advice   []  Eloped   FOLLOW-UP: No follow-up provider specified.    DISCHARGE MEDICATIONS: New Prescriptions    No medications on file          Sherrie Mcdonnell DO  Emergency Medicine Resident  [facility]       Sherrie Mcdonnell [place]  Resident  09/21/21 [id number]

## 2021-09-21 NOTE — ED NOTES
Pt. Requesting additional pain medication before d/c.  Dr. Billy Coleman notified     Hoda Rios RN  09/21/21 [id number]

## 2021-09-22 ENCOUNTER — APPOINTMENT (OUTPATIENT)
Dept: GENERAL RADIOLOGY | Age: 26
End: 2021-09-22
Attending: ORTHOPAEDIC SURGERY
Payer: MEDICARE

## 2021-09-22 ENCOUNTER — ANESTHESIA EVENT (OUTPATIENT)
Dept: OPERATING ROOM | Age: 26
End: 2021-09-22
Payer: MEDICARE

## 2021-09-22 ENCOUNTER — ANESTHESIA (OUTPATIENT)
Dept: OPERATING ROOM | Age: 26
End: 2021-09-22
Payer: MEDICARE

## 2021-09-22 ENCOUNTER — HOSPITAL ENCOUNTER (OUTPATIENT)
Age: 26
Setting detail: OUTPATIENT SURGERY
Discharge: HOME | End: 2021-09-22
Attending: ORTHOPAEDIC SURGERY | Admitting: ORTHOPAEDIC SURGERY
Payer: MEDICARE

## 2021-09-22 VITALS — DIASTOLIC BLOOD PRESSURE: 84 MMHG | SYSTOLIC BLOOD PRESSURE: 118 MMHG | TEMPERATURE: 97.7 F | OXYGEN SATURATION: 100 %

## 2021-09-22 VITALS
OXYGEN SATURATION: 95 % | SYSTOLIC BLOOD PRESSURE: 144 MMHG | DIASTOLIC BLOOD PRESSURE: 82 MMHG | HEART RATE: 89 BPM | HEIGHT: 70 IN | RESPIRATION RATE: 16 BRPM | TEMPERATURE: 97.7 F | BODY MASS INDEX: 32.93 KG/M2 | WEIGHT: 230 LBS

## 2021-09-22 DIAGNOSIS — S42.401A DISPLACED FRACTURE OF DISTAL END OF RIGHT HUMERUS: Primary | ICD-10-CM

## 2021-09-22 LAB
SARS-COV-2, RAPID: NOT DETECTED
SPECIMEN DESCRIPTION: NORMAL

## 2021-09-22 PROCEDURE — C1713 ANCHOR/SCREW BN/BN,TIS/BN: HCPCS | Performed by: ORTHOPAEDIC SURGERY

## 2021-09-22 PROCEDURE — 6360000002 HC RX W HCPCS: Performed by: SPECIALIST

## 2021-09-22 PROCEDURE — 7100000001 HC PACU RECOVERY - ADDTL 15 MIN: Performed by: ORTHOPAEDIC SURGERY

## 2021-09-22 PROCEDURE — 6360000002 HC RX W HCPCS: Performed by: ANESTHESIOLOGY

## 2021-09-22 PROCEDURE — 2709999900 HC NON-CHARGEABLE SUPPLY: Performed by: ORTHOPAEDIC SURGERY

## 2021-09-22 PROCEDURE — 73060 X-RAY EXAM OF HUMERUS: CPT

## 2021-09-22 PROCEDURE — 7100000011 HC PHASE II RECOVERY - ADDTL 15 MIN: Performed by: ORTHOPAEDIC SURGERY

## 2021-09-22 PROCEDURE — 64415 NJX AA&/STRD BRCH PLXS IMG: CPT | Performed by: ANESTHESIOLOGY

## 2021-09-22 PROCEDURE — 87635 SARS-COV-2 COVID-19 AMP PRB: CPT

## 2021-09-22 PROCEDURE — 3700000000 HC ANESTHESIA ATTENDED CARE: Performed by: ORTHOPAEDIC SURGERY

## 2021-09-22 PROCEDURE — 3600000014 HC SURGERY LEVEL 4 ADDTL 15MIN: Performed by: ORTHOPAEDIC SURGERY

## 2021-09-22 PROCEDURE — 7100000000 HC PACU RECOVERY - FIRST 15 MIN: Performed by: ORTHOPAEDIC SURGERY

## 2021-09-22 PROCEDURE — 2500000003 HC RX 250 WO HCPCS: Performed by: SPECIALIST

## 2021-09-22 PROCEDURE — 7100000010 HC PHASE II RECOVERY - FIRST 15 MIN: Performed by: ORTHOPAEDIC SURGERY

## 2021-09-22 PROCEDURE — 3209999900 FLUORO FOR SURGICAL PROCEDURES

## 2021-09-22 PROCEDURE — 6360000002 HC RX W HCPCS

## 2021-09-22 PROCEDURE — 2580000003 HC RX 258: Performed by: ORTHOPAEDIC SURGERY

## 2021-09-22 PROCEDURE — 3700000001 HC ADD 15 MINUTES (ANESTHESIA): Performed by: ORTHOPAEDIC SURGERY

## 2021-09-22 PROCEDURE — 2500000003 HC RX 250 WO HCPCS: Performed by: ANESTHESIOLOGY

## 2021-09-22 PROCEDURE — 6360000002 HC RX W HCPCS: Performed by: STUDENT IN AN ORGANIZED HEALTH CARE EDUCATION/TRAINING PROGRAM

## 2021-09-22 PROCEDURE — 3600000004 HC SURGERY LEVEL 4 BASE: Performed by: ORTHOPAEDIC SURGERY

## 2021-09-22 PROCEDURE — 2580000003 HC RX 258: Performed by: ANESTHESIOLOGY

## 2021-09-22 DEVICE — SCREW BNE L26MM DIA3.5MM CORT S STL ST LOK FULL THRD: Type: IMPLANTABLE DEVICE | Site: HUMERUS | Status: FUNCTIONAL

## 2021-09-22 DEVICE — SCREW BNE L28MM DIA3.5MM CORT S STL ST NONCANNULATED LOK: Type: IMPLANTABLE DEVICE | Site: HUMERUS | Status: FUNCTIONAL

## 2021-09-22 DEVICE — SCREW BNE L24MM DIA3.5MM CORT S STL ST LOK FULL THRD: Type: IMPLANTABLE DEVICE | Site: HUMERUS | Status: FUNCTIONAL

## 2021-09-22 DEVICE — SCREW BNE L24MM DIA3.5MM CORT S STL ST NONCANNULATED LOK: Type: IMPLANTABLE DEVICE | Site: HUMERUS | Status: FUNCTIONAL

## 2021-09-22 DEVICE — SCREW BNE L26MM DIA3.5MM CORT S STL ST NONCANNULATED LOK: Type: IMPLANTABLE DEVICE | Site: HUMERUS | Status: FUNCTIONAL

## 2021-09-22 DEVICE — PLATE BNE L158MM 6 H R DST EXTRAARTICULAR HUM S STL LOK: Type: IMPLANTABLE DEVICE | Site: HUMERUS | Status: FUNCTIONAL

## 2021-09-22 DEVICE — SCREW BNE L36MM DIA3.5MM CORT S STL ST LOK FULL THRD: Type: IMPLANTABLE DEVICE | Site: HUMERUS | Status: FUNCTIONAL

## 2021-09-22 DEVICE — GRAFT BONE CRUSH FRZ DRY CANC STRL 1-10MM 15CC: Type: IMPLANTABLE DEVICE | Site: HUMERUS | Status: FUNCTIONAL

## 2021-09-22 DEVICE — SCREW BNE L30MM DIA3.5MM CORT S STL ST NONCANNULATED LOK: Type: IMPLANTABLE DEVICE | Site: HUMERUS | Status: FUNCTIONAL

## 2021-09-22 RX ORDER — NEOSTIGMINE METHYLSULFATE 5 MG/5 ML
SYRINGE (ML) INTRAVENOUS PRN
Status: DISCONTINUED | OUTPATIENT
Start: 2021-09-22 | End: 2021-09-22 | Stop reason: SDUPTHER

## 2021-09-22 RX ORDER — LIDOCAINE HYDROCHLORIDE 10 MG/ML
INJECTION, SOLUTION EPIDURAL; INFILTRATION; INTRACAUDAL; PERINEURAL PRN
Status: DISCONTINUED | OUTPATIENT
Start: 2021-09-22 | End: 2021-09-22 | Stop reason: SDUPTHER

## 2021-09-22 RX ORDER — CYCLOBENZAPRINE HCL 10 MG
10 TABLET ORAL 3 TIMES DAILY PRN
Qty: 30 TABLET | Refills: 0 | Status: SHIPPED | OUTPATIENT
Start: 2021-09-22 | End: 2021-10-02

## 2021-09-22 RX ORDER — ACETAMINOPHEN 500 MG
1000 TABLET ORAL EVERY 6 HOURS PRN
Qty: 112 TABLET | Refills: 0 | Status: SHIPPED | OUTPATIENT
Start: 2021-09-22 | End: 2022-03-11

## 2021-09-22 RX ORDER — ONDANSETRON 2 MG/ML
INJECTION INTRAMUSCULAR; INTRAVENOUS PRN
Status: DISCONTINUED | OUTPATIENT
Start: 2021-09-22 | End: 2021-09-22 | Stop reason: SDUPTHER

## 2021-09-22 RX ORDER — MAGNESIUM HYDROXIDE 1200 MG/15ML
LIQUID ORAL CONTINUOUS PRN
Status: COMPLETED | OUTPATIENT
Start: 2021-09-22 | End: 2021-09-22

## 2021-09-22 RX ORDER — DEXAMETHASONE SODIUM PHOSPHATE 10 MG/ML
INJECTION INTRAMUSCULAR; INTRAVENOUS PRN
Status: DISCONTINUED | OUTPATIENT
Start: 2021-09-22 | End: 2021-09-22 | Stop reason: SDUPTHER

## 2021-09-22 RX ORDER — SODIUM CHLORIDE, SODIUM LACTATE, POTASSIUM CHLORIDE, CALCIUM CHLORIDE 600; 310; 30; 20 MG/100ML; MG/100ML; MG/100ML; MG/100ML
INJECTION, SOLUTION INTRAVENOUS CONTINUOUS
Status: DISCONTINUED | OUTPATIENT
Start: 2021-09-22 | End: 2021-09-22 | Stop reason: HOSPADM

## 2021-09-22 RX ORDER — MIDAZOLAM HYDROCHLORIDE 1 MG/ML
INJECTION INTRAMUSCULAR; INTRAVENOUS
Status: DISCONTINUED
Start: 2021-09-22 | End: 2021-09-22 | Stop reason: HOSPADM

## 2021-09-22 RX ORDER — FENTANYL CITRATE 50 UG/ML
INJECTION, SOLUTION INTRAMUSCULAR; INTRAVENOUS
Status: COMPLETED
Start: 2021-09-22 | End: 2021-09-22

## 2021-09-22 RX ORDER — PROPOFOL 10 MG/ML
INJECTION, EMULSION INTRAVENOUS PRN
Status: DISCONTINUED | OUTPATIENT
Start: 2021-09-22 | End: 2021-09-22 | Stop reason: SDUPTHER

## 2021-09-22 RX ORDER — NAPROXEN 500 MG/1
500 TABLET ORAL 2 TIMES DAILY WITH MEALS
Qty: 28 TABLET | Refills: 0 | Status: SHIPPED | OUTPATIENT
Start: 2021-09-22 | End: 2022-03-11

## 2021-09-22 RX ORDER — ROCURONIUM BROMIDE 10 MG/ML
INJECTION, SOLUTION INTRAVENOUS PRN
Status: DISCONTINUED | OUTPATIENT
Start: 2021-09-22 | End: 2021-09-22 | Stop reason: SDUPTHER

## 2021-09-22 RX ORDER — BUPIVACAINE HYDROCHLORIDE 5 MG/ML
25 INJECTION, SOLUTION EPIDURAL; INTRACAUDAL ONCE
Status: COMPLETED | OUTPATIENT
Start: 2021-09-22 | End: 2021-09-22

## 2021-09-22 RX ORDER — DOCUSATE SODIUM 100 MG/1
100 CAPSULE, LIQUID FILLED ORAL 2 TIMES DAILY
Qty: 20 CAPSULE | Refills: 0 | Status: SHIPPED | OUTPATIENT
Start: 2021-09-22 | End: 2022-03-11

## 2021-09-22 RX ORDER — FENTANYL CITRATE 50 UG/ML
INJECTION, SOLUTION INTRAMUSCULAR; INTRAVENOUS PRN
Status: DISCONTINUED | OUTPATIENT
Start: 2021-09-22 | End: 2021-09-22 | Stop reason: SDUPTHER

## 2021-09-22 RX ORDER — OXYCODONE HYDROCHLORIDE 5 MG/1
5-10 TABLET ORAL
Qty: 30 TABLET | Refills: 0 | Status: SHIPPED | OUTPATIENT
Start: 2021-09-22 | End: 2021-09-29

## 2021-09-22 RX ORDER — GABAPENTIN 100 MG/1
100 CAPSULE ORAL 3 TIMES DAILY
Qty: 42 CAPSULE | Refills: 0 | Status: SHIPPED | OUTPATIENT
Start: 2021-09-22 | End: 2022-03-11

## 2021-09-22 RX ORDER — ROPIVACAINE HYDROCHLORIDE 5 MG/ML
INJECTION, SOLUTION EPIDURAL; INFILTRATION; PERINEURAL
Status: DISCONTINUED | OUTPATIENT
Start: 2021-09-22 | End: 2021-09-22 | Stop reason: SDUPTHER

## 2021-09-22 RX ORDER — GLYCOPYRROLATE 1 MG/5 ML
SYRINGE (ML) INTRAVENOUS PRN
Status: DISCONTINUED | OUTPATIENT
Start: 2021-09-22 | End: 2021-09-22 | Stop reason: SDUPTHER

## 2021-09-22 RX ADMIN — ROPIVACAINE HYDROCHLORIDE 25 ML: 5 INJECTION, SOLUTION EPIDURAL; INFILTRATION; PERINEURAL at 16:33

## 2021-09-22 RX ADMIN — LIDOCAINE HYDROCHLORIDE 50 MG: 10 INJECTION, SOLUTION EPIDURAL; INFILTRATION; INTRACAUDAL; PERINEURAL at 12:43

## 2021-09-22 RX ADMIN — FENTANYL CITRATE 50 MCG: 50 INJECTION, SOLUTION INTRAMUSCULAR; INTRAVENOUS at 13:15

## 2021-09-22 RX ADMIN — DEXAMETHASONE SODIUM PHOSPHATE 10 MG: 10 INJECTION INTRAMUSCULAR; INTRAVENOUS at 13:05

## 2021-09-22 RX ADMIN — FENTANYL CITRATE 100 MCG: 50 INJECTION, SOLUTION INTRAMUSCULAR; INTRAVENOUS at 12:33

## 2021-09-22 RX ADMIN — CEFAZOLIN 2000 MG: 10 INJECTION, POWDER, FOR SOLUTION INTRAVENOUS at 12:56

## 2021-09-22 RX ADMIN — BUPIVACAINE HYDROCHLORIDE 125 MG: 5 INJECTION, SOLUTION EPIDURAL; INTRACAUDAL; PERINEURAL at 16:25

## 2021-09-22 RX ADMIN — SODIUM CHLORIDE, POTASSIUM CHLORIDE, SODIUM LACTATE AND CALCIUM CHLORIDE: 600; 310; 30; 20 INJECTION, SOLUTION INTRAVENOUS at 12:33

## 2021-09-22 RX ADMIN — FENTANYL CITRATE 50 MCG: 50 INJECTION, SOLUTION INTRAMUSCULAR; INTRAVENOUS at 13:55

## 2021-09-22 RX ADMIN — FENTANYL CITRATE 50 MCG: 50 INJECTION, SOLUTION INTRAMUSCULAR; INTRAVENOUS at 15:07

## 2021-09-22 RX ADMIN — ROCURONIUM BROMIDE 50 MG: 10 INJECTION INTRAVENOUS at 12:43

## 2021-09-22 RX ADMIN — FENTANYL CITRATE 100 MCG: 50 INJECTION, SOLUTION INTRAMUSCULAR; INTRAVENOUS at 12:43

## 2021-09-22 RX ADMIN — FENTANYL CITRATE 50 MCG: 50 INJECTION, SOLUTION INTRAMUSCULAR; INTRAVENOUS at 15:34

## 2021-09-22 RX ADMIN — SODIUM CHLORIDE, POTASSIUM CHLORIDE, SODIUM LACTATE AND CALCIUM CHLORIDE: 600; 310; 30; 20 INJECTION, SOLUTION INTRAVENOUS at 15:11

## 2021-09-22 RX ADMIN — HYDROMORPHONE HYDROCHLORIDE 0.5 MG: 1 INJECTION, SOLUTION INTRAMUSCULAR; INTRAVENOUS; SUBCUTANEOUS at 15:55

## 2021-09-22 RX ADMIN — PROPOFOL 200 MG: 10 INJECTION, EMULSION INTRAVENOUS at 12:43

## 2021-09-22 RX ADMIN — ONDANSETRON 4 MG: 2 INJECTION, SOLUTION INTRAMUSCULAR; INTRAVENOUS at 15:14

## 2021-09-22 RX ADMIN — Medication 3 MG: at 15:20

## 2021-09-22 RX ADMIN — ROCURONIUM BROMIDE 20 MG: 10 INJECTION INTRAVENOUS at 14:20

## 2021-09-22 RX ADMIN — Medication 0.6 MG: at 15:20

## 2021-09-22 RX ADMIN — ROCURONIUM BROMIDE 20 MG: 10 INJECTION INTRAVENOUS at 13:53

## 2021-09-22 ASSESSMENT — PULMONARY FUNCTION TESTS
PIF_VALUE: 23
PIF_VALUE: 21
PIF_VALUE: 20
PIF_VALUE: 23
PIF_VALUE: 21
PIF_VALUE: 19
PIF_VALUE: 20
PIF_VALUE: 23
PIF_VALUE: 21
PIF_VALUE: 18
PIF_VALUE: 23
PIF_VALUE: 19
PIF_VALUE: 18
PIF_VALUE: 19
PIF_VALUE: 20
PIF_VALUE: 19
PIF_VALUE: 23
PIF_VALUE: 20
PIF_VALUE: 12
PIF_VALUE: 22
PIF_VALUE: 21
PIF_VALUE: 20
PIF_VALUE: 20
PIF_VALUE: 21
PIF_VALUE: 17
PIF_VALUE: 20
PIF_VALUE: 22
PIF_VALUE: 20
PIF_VALUE: 23
PIF_VALUE: 50
PIF_VALUE: 20
PIF_VALUE: 18
PIF_VALUE: 18
PIF_VALUE: 23
PIF_VALUE: 20
PIF_VALUE: 3
PIF_VALUE: 19
PIF_VALUE: 21
PIF_VALUE: 19
PIF_VALUE: 22
PIF_VALUE: 20
PIF_VALUE: 21
PIF_VALUE: 20
PIF_VALUE: 22
PIF_VALUE: 20
PIF_VALUE: 22
PIF_VALUE: 22
PIF_VALUE: 0
PIF_VALUE: 20
PIF_VALUE: 29
PIF_VALUE: 21
PIF_VALUE: 21
PIF_VALUE: 22
PIF_VALUE: 4
PIF_VALUE: 20
PIF_VALUE: 19
PIF_VALUE: 21
PIF_VALUE: 19
PIF_VALUE: 17
PIF_VALUE: 20
PIF_VALUE: 18
PIF_VALUE: 20
PIF_VALUE: 21
PIF_VALUE: 21
PIF_VALUE: 18
PIF_VALUE: 20
PIF_VALUE: 21
PIF_VALUE: 1
PIF_VALUE: 20
PIF_VALUE: 20
PIF_VALUE: 19
PIF_VALUE: 20
PIF_VALUE: 20
PIF_VALUE: 22
PIF_VALUE: 20
PIF_VALUE: 22
PIF_VALUE: 8
PIF_VALUE: 20
PIF_VALUE: 20
PIF_VALUE: 22
PIF_VALUE: 21
PIF_VALUE: 21
PIF_VALUE: 3
PIF_VALUE: 19
PIF_VALUE: 20
PIF_VALUE: 24
PIF_VALUE: 19
PIF_VALUE: 20
PIF_VALUE: 21
PIF_VALUE: 20
PIF_VALUE: 21
PIF_VALUE: 22
PIF_VALUE: 19
PIF_VALUE: 20
PIF_VALUE: 16
PIF_VALUE: 21
PIF_VALUE: 19
PIF_VALUE: 9
PIF_VALUE: 20
PIF_VALUE: 20
PIF_VALUE: 16
PIF_VALUE: 21
PIF_VALUE: 25
PIF_VALUE: 20
PIF_VALUE: 21
PIF_VALUE: 21
PIF_VALUE: 19
PIF_VALUE: 21
PIF_VALUE: 20
PIF_VALUE: 20
PIF_VALUE: 19
PIF_VALUE: 20
PIF_VALUE: 21
PIF_VALUE: 16
PIF_VALUE: 22
PIF_VALUE: 19
PIF_VALUE: 20
PIF_VALUE: 20
PIF_VALUE: 1
PIF_VALUE: 21
PIF_VALUE: 22
PIF_VALUE: 0
PIF_VALUE: 20
PIF_VALUE: 22
PIF_VALUE: 22
PIF_VALUE: 1
PIF_VALUE: 23
PIF_VALUE: 7
PIF_VALUE: 21
PIF_VALUE: 20
PIF_VALUE: 18
PIF_VALUE: 19
PIF_VALUE: 18
PIF_VALUE: 19
PIF_VALUE: 20
PIF_VALUE: 22
PIF_VALUE: 22
PIF_VALUE: 20
PIF_VALUE: 22
PIF_VALUE: 20
PIF_VALUE: 20
PIF_VALUE: 22
PIF_VALUE: 22
PIF_VALUE: 21
PIF_VALUE: 23
PIF_VALUE: 21
PIF_VALUE: 18
PIF_VALUE: 21
PIF_VALUE: 17
PIF_VALUE: 11
PIF_VALUE: 20
PIF_VALUE: 16
PIF_VALUE: 21
PIF_VALUE: 17
PIF_VALUE: 20
PIF_VALUE: 18
PIF_VALUE: 24
PIF_VALUE: 20
PIF_VALUE: 23
PIF_VALUE: 20
PIF_VALUE: 20
PIF_VALUE: 18
PIF_VALUE: 22

## 2021-09-22 ASSESSMENT — PAIN DESCRIPTION - FREQUENCY
FREQUENCY: CONTINUOUS
FREQUENCY: CONTINUOUS

## 2021-09-22 ASSESSMENT — PAIN SCALES - GENERAL
PAINLEVEL_OUTOF10: 4
PAINLEVEL_OUTOF10: 10
PAINLEVEL_OUTOF10: 0
PAINLEVEL_OUTOF10: 10

## 2021-09-22 ASSESSMENT — PAIN DESCRIPTION - PAIN TYPE
TYPE: SURGICAL PAIN
TYPE: SURGICAL PAIN

## 2021-09-22 ASSESSMENT — PAIN - FUNCTIONAL ASSESSMENT: PAIN_FUNCTIONAL_ASSESSMENT: 0-10

## 2021-09-22 ASSESSMENT — PAIN DESCRIPTION - LOCATION
LOCATION: ARM

## 2021-09-22 ASSESSMENT — PAIN DESCRIPTION - DESCRIPTORS: DESCRIPTORS: SHARP;SHOOTING;STABBING

## 2021-09-22 ASSESSMENT — PAIN DESCRIPTION - ORIENTATION
ORIENTATION: RIGHT
ORIENTATION: RIGHT
ORIENTATION: RIGHT;UPPER;MID;LOWER
ORIENTATION: RIGHT

## 2021-09-22 NOTE — ANESTHESIA PRE PROCEDURE
Department of Anesthesiology  Preprocedure Note       Name:  Brittany Escamilla   Age:  27 y.o.  :  1995                                          MRN:  [id number]         Date:  2021      Surgeon: Rani Ng):  Charbel Lo MD    Procedure: Procedure(s):  ORIF DISTAL HUMERUS, SYNTHES  DISTAL HUMERUS, C-ARM    Medications prior to admission:   Prior to Admission medications    Medication Sig Start Date End Date Taking? Authorizing Provider   ibuprofen (ADVIL;MOTRIN) 200 MG tablet Take 4 tablets by mouth every 6 hours as needed for Pain or Fever 21  Yes Alessandra Sinks, DO   oxyCODONE (ROXICODONE) 5 MG immediate release tablet Take 1 tablet by mouth every 6 hours as needed for Pain for up to 3 days. Intended supply: 3 days. Take lowest dose possible to manage pain 21 Yes Alessandra Sinks, DO   acetaminophen (TYLENOL) 325 MG tablet Take 2 tablets by mouth every 6 hours as needed for Pain 9/15/21  Yes Salina Grant MD   cyclobenzaprine (FLEXERIL) 5 MG tablet Take 1 tablet by mouth 2 times daily as needed for Muscle spasms 9/15/21 9/25/21 Yes Salina Grant MD   sertraline (ZOLOFT) 50 MG tablet Take 50 mg by mouth nightly   Yes Historical Provider, MD   busPIRone (BUSPAR) 5 MG tablet Take 5 mg by mouth daily   Yes Historical Provider, MD   buPROPion (WELLBUTRIN XL) 150 MG extended release tablet Take 150 mg by mouth every morning   Yes Historical Provider, MD   gabapentin (NEURONTIN) 300 MG capsule Take 300 mg by mouth 3 times daily.    Yes Historical Provider, MD   traZODone (DESYREL) 100 MG tablet Take 100 mg by mouth nightly   Yes Historical Provider, MD   hydrOXYzine (VISTARIL) 25 MG capsule Take 25 mg by mouth 3 times daily as needed for Anxiety   Yes Historical Provider, MD   diclofenac sodium (VOLTAREN) 1 % GEL Apply topically 2 times daily for 6 days 3/22/21 3/28/21  Mendez Murillo DO       Current medications:    Current Facility-Administered Medications   Medication Dose Route Frequency Provider Last Rate Last Admin    ceFAZolin (ANCEF) 2000 mg in dextrose 5 % 50 mL IVPB  2,000 mg IntraVENous On Call to [address], DO        midazolam (VERSED) 2 MG/2ML injection             fentaNYL (SUBLIMAZE) 100 MCG/2ML injection             lactated ringers infusion   IntraVENous Continuous Shad Santos MD           Allergies:  No Known Allergies    Problem List:    Patient Active Problem List   Diagnosis Code   (none) - all problems resolved or deleted       Past Medical History:        Diagnosis Date    Anxiety     Depression     History of substance abuse ([facility] [address]        Past Surgical History:        Procedure Laterality Date    KNEE SURGERY      LAPAROSCOPIC APPENDECTOMY N/A 01/08/2020    APPENDECTOMY LAPAROSCOPIC performed by Carlos Medellin MD at [address] [facility] History:    Social History     Tobacco Use    Smoking status: Current Every Day Smoker    Smokeless tobacco: Never Used   Substance Use Topics    Alcohol use: No                                Ready to quit: Not Answered  Counseling given: Not Answered      Vital Signs (Current):   Vitals:    09/22/21 1224   BP: (!) 144/86   Pulse: 78   Resp: 19   Temp: 97 °F (36.1 °C)   TempSrc: Temporal   SpO2: 99%   Weight: 230 lb (104.3 kg)   Height: 5' 10\" (1.778 m)                                              BP Readings from Last 3 Encounters:   09/22/21 (!) 144/86   09/21/21 (!) 153/90   09/15/21 (!) 149/100       NPO Status: Time of last liquid consumption: 2345                        Time of last solid consumption: 2345                        Date of last liquid consumption: 09/21/21                        Date of last solid food consumption: 09/21/21    BMI:   Wt Readings from Last 3 Encounters:   09/22/21 230 lb (104.3 kg)   09/21/21 220 lb (99.8 kg)   03/22/21 220 lb (99.8 kg)     Body mass index is 33 kg/m².     CBC:   Lab Results   Component Value Date    WBC 9.1 08/04/2021    RBC 5.61 08/04/2021 HGB 16.1 08/04/2021    HCT 47.7 08/04/2021    MCV 85.0 08/04/2021    RDW 14.7 08/04/2021     08/04/2021       CMP:   Lab Results   Component Value Date     01/08/2020    K 4.8 01/08/2020    CL 98 01/08/2020    CO2 25 01/08/2020    BUN 12 01/08/2020    CREATININE 1.37 08/04/2021    CREATININE 0.86 01/08/2020    GFRAA >60 01/08/2020    LABGLOM NOT REPORTED 08/04/2021    GLUCOSE 92 01/08/2020    PROT 7.0 07/27/2019    CALCIUM 9.6 01/08/2020    BILITOT 0.20 07/27/2019    ALKPHOS 73 07/27/2019    AST 21 07/27/2019    ALT 26 07/27/2019       POC Tests: No results for input(s): POCGLU, POCNA, POCK, POCCL, POCBUN, POCHEMO, POCHCT in the last 72 hours. Coags: No results found for: PROTIME, INR, APTT    HCG (If Applicable): No results found for: PREGTESTUR, PREGSERUM, HCG, HCGQUANT     ABGs: No results found for: PHART, PO2ART, CPH5UTV, DTU8VGC, BEART, S5AOGMRE     Type & Screen (If Applicable):  No results found for: LABABO, LABRH    Drug/Infectious Status (If Applicable):  Lab Results   Component Value Date    HEPCAB REACTIVE 07/27/2019       COVID-19 Screening (If Applicable):   Lab Results   Component Value Date    COVID19 Not Detected 09/22/2021           Anesthesia Evaluation  Patient summary reviewed and Nursing notes reviewed no history of anesthetic complications:   Airway: Mallampati: II  TM distance: >3 FB   Neck ROM: full  Mouth opening: > = 3 FB Dental: normal exam         Pulmonary:Negative Pulmonary ROS and normal exam                               Cardiovascular:Negative CV ROS  Exercise tolerance: good (>4 METS),       (-) past MI, CAD, CABG/stent, dysrhythmias,  angina,  CHF and orthopnea      Rhythm: regular  Rate: normal           Beta Blocker:  Not on Beta Blocker         Neuro/Psych:   (+) psychiatric history:            GI/Hepatic/Renal:   (+) morbid obesity          Endo/Other: Negative Endo/Other ROS                    Abdominal:             Vascular:           Other Findings: Anesthesia Plan      general and regional     ASA 2     (Post op block after assessment of radial nerve )  Induction: intravenous. MIPS: Postoperative opioids intended and Prophylactic antiemetics administered. Anesthetic plan and risks discussed with patient.       Plan discussed with CRNA and surgical team.                  Antonio De Jesus MD   9/22/2021

## 2021-09-22 NOTE — H&P
History and Physical    Pt Name: Savannah Brian  MRN: [id number]  YOB: 1995  Date of evaluation: 9/22/2021    SUBJECTIVE:   History of Chief Complaint:    Patient presents preprocedure for ORIF right distal humerus fracture. He says that he initially injured himself while incarcerated, approximately two weeks ago, splinted/casted at [facility].  He says that he was holding his baby and felt pain. He was seen at the ER and fracture reduced and splinted. He has been scheduled for surgery today. Past Medical History    has a past medical history of History of substance abuse ([facility] [address] Past Surgical History   has a past surgical history that includes knee surgery and laparoscopic appendectomy (N/A, 01/08/2020). Medications  Prior to Admission medications    Medication Sig Start Date End Date Taking? Authorizing Provider   ibuprofen (ADVIL;MOTRIN) 200 MG tablet Take 4 tablets by mouth every 6 hours as needed for Pain or Fever 9/21/21   Tino Werner DO   oxyCODONE (ROXICODONE) 5 MG immediate release tablet Take 1 tablet by mouth every 6 hours as needed for Pain for up to 3 days. Intended supply: 3 days. Take lowest dose possible to manage pain 9/21/21 9/24/21  Lowell Ramirez DO   acetaminophen (TYLENOL) 325 MG tablet Take 2 tablets by mouth every 6 hours as needed for Pain 9/15/21   Randy Mccormack MD   cyclobenzaprine (FLEXERIL) 5 MG tablet Take 1 tablet by mouth 2 times daily as needed for Muscle spasms 9/15/21 9/25/21  Randy Mccormack MD   sertraline (ZOLOFT) 50 MG tablet Take 50 mg by mouth nightly    Historical Provider, MD   busPIRone (BUSPAR) 5 MG tablet Take 5 mg by mouth daily    Historical Provider, MD   buPROPion (WELLBUTRIN XL) 150 MG extended release tablet Take 150 mg by mouth every morning    Historical Provider, MD   gabapentin (NEURONTIN) 300 MG capsule Take 300 mg by mouth 3 times daily.     Historical Provider, MD   traZODone (DESYREL) 100 MG tablet Take 100 mg by mouth nightly Historical Provider, MD   hydrOXYzine (VISTARIL) 25 MG capsule Take 25 mg by mouth 3 times daily as needed for Anxiety    Historical Provider, MD   diclofenac sodium (VOLTAREN) 1 % GEL Apply topically 2 times daily for 6 days 3/22/21 3/28/21  Saran Burrs, DO     Allergies  has No Known Allergies. Family History  family history includes Hypertension in an other family member. Social History   reports that he has been smoking. He has never used smokeless tobacco.   reports no history of alcohol use. reports previous drug use. Drugs: Opiates , IV, Marijuana, and Cocaine. Marital Status single  Occupation none    REVIEW OF SYSTEMS    Constitutional: [] fever  [] chills  [] weight loss  []weakness [x] negative  Eyes:  [] photophobia  [] discharge [] acuity change   [] Diplopia   [x] negative  HENT:  [] sore throat  [] ear pain [] Tinnitus   [x] negative  Respiratory:  [] Cough  [] Shortness of breath   [] Sputum   [x] negative  Cardiac: []Chest pain   []Palpitations []Edema  []PND  [x] negative  GI:  []Abdominal pain   []Nausea  []Vomiting  []Diarrhea  [x] negative  :  [] Dysuria   []Frequency  []Hematuria  []Discharge  [x] negative  Musculoskeletal:  []Back pain  []Neck pain  [x]Recent Injury [] negative   RUE humerus fracture, in splint and sling  Skin:  []Rash  [] Itching  [x] negative  Neurologic:  [] Headache  [] Focal weakness  [] Sensory changes [x]negative  Endocrine:  [] Polyuria  [] Polydipsia  [] Hair Loss  [x] negative  Lymphatic:   [] Swollen glands [x] negative  Psychiatric:  [] Depression  [] Suicidal ideation  [] Homicidal ideation  [] Anxiety [x] negative  All systems negative except as marked. OBJECTIVE:   VITALS: per database in [facility]. CONSTITUTIONAL:alert & cooperative, no acute distress. In splint/sling RUE. Present with mother and daughter. SKIN:  Warm and dry, no rashes on exposed areas of skin. HEAD:  Normocephalic, atraumatic. EYES: EOMs intact. EARS:  Hearing grossly WNL. NOSE:  Nares patent. No rhinorrhea. MOUTH/THROAT:  benign  NECK:supple, no lymphadenopathy  LUNGS: Clear to auscultation bilaterally, no wheezes. CARDIOVASCULAR: Heart sounds are normal.  Regular rate and rhythm without murmur. ABDOMEN: soft, non tender, non distended. EXTREMITIES: RUE in splint/sling    IMPRESSIONS:   1. Humerus fracture  2.  has a past medical history of History of substance abuse ([facility] [address]    PLANS:   1. ORIF right distal humerus fracture    NAVEED Lyons PA-C  Electronically signed 9/22/2021 at 11:46 AM

## 2021-09-22 NOTE — BRIEF OP NOTE
Brief Postoperative Note      Patient: Chacho Singh  YOB: 1995  MRN: [id number]    Date of Procedure: 9/22/2021    Pre-Op Diagnosis: Fractured right distal humerus    Post-Op Diagnosis: Fractured right distal humerus       Procedure(s):  ORIF of right distal humerus    Surgeon(s):  Harry Lomeli MD    Assistant:  Resident: Barron Tomas DO; Mansoor Bates DO    Anesthesia: General    Estimated Blood Loss (mL): 300 mL    Fluids: 1000 mL crystalloids    Complications: None    Specimens:   None    Implants:  Implant Name Type Inv.  Item Serial No.  Lot No. LRB No. Used Action   GRAFT BONE CRUSH FRZ DRY CANC STRL 1-10MM 15 - [id number]  GRAFT BONE CRUSH FRZ DRY CANC STRL 1-10MM [address] [facility] [id number] [facility]  Right 1 Implanted   SCREW BNE L24MM DIA3.5MM ROLAND S STL ST ALISHA FULL THRD  SCREW BNE L24MM DIA3.5MM ROLAND S STL ST ALISHA FULL THRD  [employer]PU[employer]WD  Right 1 Implanted   SCREW BNE L26MM DIA3.5MM ROLAND S STL ST [place]ISHA FULL THRD  SCREW BNE L26MM DIA3.5MM ROLAND S STL ST [place]ISHA FULL THRD  DEPUY [employer]-WD  Right 1 Implanted   SCREW BNE L36MM DIA3.5MM ROLAND S STL ST [place]HA FULL THRD  SCREW BNE L36MM DIA3.5MM ROLAND S STL ST ALISHA FULL THRD  DEPUY [employer]-WD  Right 1 Implanted   SCREW BNE L24MM DIA3.5MM ROLAND S STL ST NONCANNULATED ALISHA  SCREW BNE L24MM DIA3.5MM ROLAND S STL ST NONCANNULATED ALISHA  DEPUY [employer]  Right 1 Implanted   SCREW BNE L26MM DIA3.5MM ROLAND S STL ST NONCANNULATED ALISHA  SCREW BNE L26MM DIA3.5MM ROLAND S STL ST NONCANNULATED ALISHA  DEPUY [employer]WD  Right 2 Implanted   SCREW BNE L28MM DIA3.5MM ROLAND S STL ST NONCANNULATED ALISHA  SCREW BNE L28MM DIA3.5MM ROLAND S STL ST NONCANNULATED ALISHA  DEPUY [employer]  Right 1 Implanted   SCREW BNE L30MM DIA3.5MM ROLAND S STL ST NONCANNULATED [place]ISHA  SCREW BNE L30MM DIA3.5MM ROLAND S STL ST NONCANNULATED ALISHA  [employer]WD  Right 1 Implanted   PLATE BNE [id number] 6 H R DST EXTRAARTICULAR HUM S STL ALISHA  PLATE BNE L158MM 6 H R DST EXTRAARTICULAR HUM S STL ALISHA  DEPUY [employer]  Right 1 Implanted         Drains: None    Findings: Fractured right distal humerus    Electronically signed by Gualberto Menendez DO on 9/22/2021 at 3:23 PM

## 2021-09-22 NOTE — PROGRESS NOTES
1619--Time out performed with Dr Tommy Palacios for superclavicular block via ultrasound. 1622- procedure started, 25 ml of 0.5% Bupivacaine given. 1627- procedure complete. Pt tolerated well.

## 2021-09-23 NOTE — OP NOTE
[facility] [address]                                OPERATIVE REPORT    PATIENT NAME: Javier Reyes                   :        1995  MED REC NO:   [id number]                             ROOM:  ACCOUNT NO:   [de-identified]                           ADMIT DATE: 2021  PROVIDER:     Dev Shelton D.O.    DATE OF PROCEDURE:  2021    PREOPERATIVE DIAGNOSIS:  Fracture of the right distal humerus. POSTOPERATIVE DIAGNOSIS:  Right distal humerus fracture. PROCEDURE PERFORMED:  Open reduction and internal fixation of the right  distal humerus. SURGEON:  Rowan Kelly MD    ASSISTANTS:  Dev Shelton DO and Goldie Juarez DO    ANESTHESIA:  General.    ESTIMATED BLOOD LOSS:  300 mL. FLUIDS:  1000 mL of crystalloid. COMPLICATIONS:  None. SPECIMENS:  None. IMPLANTS:  One 3.5 mm cortical screw in a lag-by technique fashion, a  Synthes 6-hole posterolateral extra-articular distal humerus plate,  three distal locking screws, and four 3.5 mm cortical screws. INDICATIONS FOR PROCEDURE:  The patient is a 22-year-old male who  sustained a right distal humerus fracture while arm wrestling as  individual [facility]. He presented to the emergency department where they  attempted a closed reduction and placed him into a posterior long arm  splint. He was then brought for surgery for today. Surgical procedure  was discussed with the patient as well as the risks to include bleeding, infection, damage to surrounding structures, malunion, nonunion, and hardware failure. The  patient agreed to move forward with the procedure. At that time, a  consent was obtained. His right upper extremity was then marked with a  permanent marker. Procedure: He was then transferred from the preoperative area to  the operating suite. He was positioned in a supine position initially  for intubation.   He was intubated without any complications by  anesthesia. He was then flipped in a prone position over gel rolls and  all bony prominences were well padded. His arm was draped over a  plexiglass armboard with a stack of blankets to a gravity to aid in his  reduction. At this point, his right upper extremity was sterilely  prepped and draped in a standard fashion and a time-out was performed  with all members of the team present in agreement. Began first with a  modified Gerwin approach. We, using a #10 blade, made a posterior  incision, dissected down to the triceps fascia. The lateral  intermuscular septum of the fascia was identified as well as the  posterior cutaneous nerve off the radial nerve. This has been traced  proximally to the radial nerve. The radial nerve was identified and  protected throughout the procedure. At this point, we then elevated the  triceps medially, looking for the fracture site. The fracture site was  identified and it was found to have a significant amount of fibrous  callus present. The fibrous callus was removed with a rongeur. Fracture edges were debrided for better reduction. We then obtained  provisional reduction with point-of-reduction forceps and x-rays were  obtained to confirm our reduction. We attempted at this point to place  a 3.5 mm cortical screw in a lag-by technique fashion across the  fracture to better secure it and compress the fracture edges. At this point, we placed our  Synthes posterolateral extra-articular plate and secured into place both  proximally and distally with cortical screws to secure the plate to the  Bone. We took care to protect the radial nerve during plate insertion. The radial nerve is located just proximal to the plate. The additional holes proximally were then filled with 3.5 mm  cortical screws and distally with 3.5 mm distal locking screws. Final  images were obtained.   It should be noted that there was a large defect  laterally where some bony comminution was present. This was filled  with crushed corticocancellous graft. The wound was copiously irrigated  with normal saline and deep fascia was then closed with 0 Vicryl and  subcutaneous tissue with 2-0 Vicryl followed by 2-0 Stratafix for the  skin. Dermabond and skin glue was placed over the skin to seal.  At  this point, a sterile dressing was applied followed by a sling. The  patient was awoken from anesthesia without any complications and  transferred to PACU where the care was assumed by the PACU nurses with  the patient in stable condition. It should be noted that in the PACU,  the patient did have a slight radial nerve neuropraxia post operatively. Sensation in the radial nerve distribution was in tact as well as index through small finger extension. Patient was however having difficulty  with wrist extension, which we will continue to observe. Plan will be  for the patient to follow up in two weeks for postoperative check. He  is encouraged to work on shoulder, elbow, wrist, and finger range of motion until then. Dr. Chema Paz was present for the critical portions of the procedure.         Aga Child D.O.    D: 09/22/2021 16:02:35       T: 09/22/2021 23:22:37     [email or web address]  Job#: [id number]     Doc#: [id number]    CC:

## 2021-09-24 NOTE — ANESTHESIA POSTPROCEDURE EVALUATION
Department of Anesthesiology  Postprocedure Note    Patient: Adelina Carbajal  MRN: [id number]  YOB: 1995  Date of evaluation: 9/24/2021  Time:  11:17 AM     Procedure Summary     Date: 09/22/21 Room / Location: [address]    Anesthesia Start: 8166 Anesthesia Stop: 0762    Procedure: ORIF DISTAL HUMERUS (Right ) Diagnosis: (FRACTURED HUMERUS)    Surgeons: Kai Zuniga MD Responsible Provider: Ana Mayen MD    Anesthesia Type: general, regional ASA Status: 2          Anesthesia Type: general, regional    Shaan Phase I: Shaan Score: 8    Shaan Phase II: Shaan Score: 9    Last vitals: Reviewed and per EMR flowsheets.        Anesthesia Post Evaluation    Patient location during evaluation: PACU  Patient participation: complete - patient participated  Level of consciousness: awake and alert  Pain score: 0  Airway patency: patent  Nausea & Vomiting: no nausea and no vomiting  Complications: no  Cardiovascular status: hemodynamically stable  Respiratory status: room air  Hydration status: euvolemic

## 2021-09-29 NOTE — ED PROVIDER NOTES
[facility]     Emergency Department     Faculty Note/ Attestation      Pt Name: Nika Griffiths                                       MRN: [id number]  [place] 1995  Date of evaluation: 9/21/2021    Patients PCP:    No primary care provider on file. Attestation  I performed a history and physical examination of the patient and discussed management with the resident. I reviewed the residents note and agree with the documented findings and plan of care. Any areas of disagreement are noted on the chart. I was personally present for the key portions of any procedures. I have documented in the chart those procedures where I was not present during the key portions. I have reviewed the emergency nurses triage note. I agree with the chief complaint, past medical history, past surgical history, allergies, medications, social and family history as documented unless otherwise noted below. For Physician Assistant/ Nurse Practitioner cases/documentation I have personally evaluated this patient and have completed at least one if not all key elements of the E/M (history, physical exam, and MDM). Additional findings are as noted. Initial Screens:             Vitals:    Vitals:    09/21/21 0102   BP: (!) 153/90   Pulse: 100   Resp: 20   Temp: 98.3 °F (36.8 °C)   TempSrc: Oral   SpO2: 96%   Weight: 220 lb (99.8 kg)   Height: 5' 11\" (1.803 m)       CHIEF COMPLAINT       Chief Complaint   Patient presents with    Arm Pain     Bounce back after missing follow up for fracture    EMERGENCY DEPARTMENT COURSE:     -------------------------  BP: (!) 153/90, Temp: 98.3 °F (36.8 °C), Pulse: 100, Resp: 20      Zander Faulkner DO,, , RDMS.   Attending Emergency Physician          Ana Shook DO  09/29/21 3144

## 2021-10-04 NOTE — ED PROVIDER NOTES
[address] [facility]   Emergency Department  Faculty Attestation       I performed a history and physical examination of the patient and discussed management with the resident. I reviewed the residents note and agree with the documented findings including all diagnostic interpretations and plan of care. Any areas of disagreement are noted on the chart. I was personally present for the key portions of any procedures. I have documented in the chart those procedures where I was not present during the key portions. I have reviewed the emergency nurses triage note. I agree with the chief complaint, past medical history, past surgical history, allergies, medications, social and family history as documented unless otherwise noted below. Documentation of the HPI, Physical Exam and Medical Decision Making performed by marianaibkaren is based on my personal performance of the HPI, PE and MDM. For Physician Assistant/ Nurse Practitioner cases/documentation I have personally evaluated this patient and have completed at least one if not all key elements of the E/M (history, physical exam, and MDM). Additional findings are as noted. Pertinent Comments     Primary Care Physician: No primary care provider on file. ED Triage Vitals [09/21/21 0102]   BP Temp Temp Source Pulse Resp SpO2 Height Weight   (!) 153/90 98.3 °F (36.8 °C) Oral 100 20 96 % 5' 11\" (1.803 m) 220 lb (99.8 kg)        History/Physical: This is a 27 y.o. male who presents to the Emergency Department with complaint of right arm pain with numbness and tingling. Patient had a closed displaced fracture of the right distal humerus on 9/15. He was placed in a splint at that time. However today he had a child, his arm and now is having numbness and tingling in his fingers. On exam, patient does have some decrease sensation to his digits. Splint was taken down, and does appear to have indentations from how tightly wrapped the Ace wrap was.   There is no ischemia has no limb. Denies and range of motion of the elbow secondary to the fracture. Was able to wiggle the fingers. Sensation is improving after taking the splint off. Cap refill < 2 s    MDM/Plan:   Arm fracture has not had his follow-up yet. Now having numbness and tingling. May have been secondary to the Ace wrap being too tight. However also orbit involved and that could have moved to the fracture site. There is no open portion of the fracture. And sensation and pulses are restored.   Ortho consulted  Resplinted  Okay for discharge      Critical Care: None     Sriram Mendoza MD  Attending Emergency Physician        Sriram Mendoza MD  10/03/21 3712

## 2021-10-30 ENCOUNTER — APPOINTMENT (OUTPATIENT)
Dept: GENERAL RADIOLOGY | Age: 26
End: 2021-10-30
Payer: MEDICARE

## 2021-10-30 ENCOUNTER — HOSPITAL ENCOUNTER (EMERGENCY)
Age: 26
Discharge: HOME | End: 2021-10-30
Attending: EMERGENCY MEDICINE
Payer: MEDICARE

## 2021-10-30 VITALS
HEART RATE: 70 BPM | OXYGEN SATURATION: 99 % | DIASTOLIC BLOOD PRESSURE: 84 MMHG | RESPIRATION RATE: 16 BRPM | TEMPERATURE: 97.9 F | SYSTOLIC BLOOD PRESSURE: 141 MMHG

## 2021-10-30 DIAGNOSIS — G56.31 RADIAL NERVE PALSY, RIGHT: ICD-10-CM

## 2021-10-30 DIAGNOSIS — M79.601 RIGHT ARM PAIN: Primary | ICD-10-CM

## 2021-10-30 PROCEDURE — 6370000000 HC RX 637 (ALT 250 FOR IP): Performed by: STUDENT IN AN ORGANIZED HEALTH CARE EDUCATION/TRAINING PROGRAM

## 2021-10-30 PROCEDURE — 73080 X-RAY EXAM OF ELBOW: CPT

## 2021-10-30 PROCEDURE — 99282 EMERGENCY DEPT VISIT SF MDM: CPT

## 2021-10-30 PROCEDURE — 73060 X-RAY EXAM OF HUMERUS: CPT

## 2021-10-30 RX ORDER — LIDOCAINE 50 MG/G
1 PATCH TOPICAL DAILY
Qty: 30 PATCH | Refills: 0 | Status: SHIPPED | OUTPATIENT
Start: 2021-10-30 | End: 2022-03-11

## 2021-10-30 RX ORDER — LIDOCAINE 4 G/G
1 PATCH TOPICAL DAILY
Status: DISCONTINUED | OUTPATIENT
Start: 2021-10-30 | End: 2021-10-30 | Stop reason: HOSPADM

## 2021-10-30 RX ORDER — OXYCODONE HYDROCHLORIDE 5 MG/1
5 TABLET ORAL ONCE
Status: COMPLETED | OUTPATIENT
Start: 2021-10-30 | End: 2021-10-30

## 2021-10-30 RX ADMIN — OXYCODONE 5 MG: 5 TABLET ORAL at 16:10

## 2021-10-30 ASSESSMENT — ENCOUNTER SYMPTOMS
PHOTOPHOBIA: 0
ABDOMINAL PAIN: 0
SHORTNESS OF BREATH: 0

## 2021-10-30 ASSESSMENT — PAIN SCALES - GENERAL: PAINLEVEL_OUTOF10: 9

## 2021-10-30 NOTE — ED PROVIDER NOTES
FAMILY HISTORY      has a past medical history of Anxiety, Depression, and History of substance abuse ([facility] [address] has a past surgical history that includes knee surgery; laparoscopic appendectomy (N/A, 01/08/2020); Humerus fracture surgery (Right, 09/22/2021); and Humerus fracture surgery (Right, 9/22/2021). 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/21    Sexual activity: Not on file   Other Topics Concern    Not on file   Social History Narrative    Not on file     Social Determinants of Health     Financial Resource Strain:     Difficulty of Paying Living Expenses:    Food Insecurity:     Worried About Running Out of Food in the Last Year:     [address] in the Last Year:    Transportation Needs:     Lack of Transportation (Medical):      Lack of Transportation (Non-Medical):    Physical Activity:     Days of Exercise per Week:     Minutes of Exercise per Session:    Stress:     Feeling of Stress :    Social Connections:     Frequency of Communication with Friends and Family:     Frequency of Social Gatherings with Friends and Family:     Attends [employer] Services:     Active Member of Clubs or Organizations:     Attends Club or Organization Meetings:     Marital Status:    Intimate Partner Violence:     Fear of Current or Ex-Partner:     Emotionally Abused:     Physically Abused:     Sexually Abused:        Family History   Problem Relation Age of Onset    Hypertension Other        Allergies: Patient has no known allergies. Home Medications:  Prior to Admission medications    Medication Sig Start Date End Date Taking? Authorizing Provider   acetaminophen (TYLENOL) 500 MG tablet Take 2 tablets by mouth every 6 hours as needed for Pain 9/22/21   Hai Balderas, DO   docusate sodium (COLACE) 100 MG capsule Take 1 capsule by mouth 2 times daily 9/22/21   Hai Balderas, DO   gabapentin (NEURONTIN) 100 MG capsule Take 1 capsule by mouth 3 times daily for 14 days. 9/22/21 10/6/21  Hai Balderas, DO   naproxen (NAPROSYN) 500 MG tablet Take 1 tablet by mouth 2 times daily (with meals) 9/22/21   Hai Balderas, DO   sertraline (ZOLOFT) 50 MG tablet Take 50 mg by mouth nightly    Historical Provider, MD   busPIRone (BUSPAR) 5 MG tablet Take 5 mg by mouth daily    Historical Provider, MD   buPROPion (WELLBUTRIN XL) 150 MG extended release tablet Take 150 mg by mouth every morning    Historical Provider, MD   traZODone (DESYREL) 100 MG tablet Take 100 mg by mouth nightly    Historical Provider, MD   hydrOXYzine (VISTARIL) 25 MG capsule Take 25 mg by mouth 3 times daily as needed for Anxiety    Historical Provider, MD   diclofenac sodium (VOLTAREN) 1 % GEL Apply topically 2 times daily for 6 days 3/22/21 3/28/21  [facility], DO       REVIEW OF SYSTEMS    (2-9 systems for level 4, 10 or more for level 5)      Review of Systems   Constitutional: Negative for fever. Eyes: Negative for photophobia. Respiratory: Negative for shortness of breath. Cardiovascular: Negative for chest pain. Gastrointestinal: Negative for abdominal pain. Musculoskeletal: Positive for arthralgias and myalgias. Negative for neck pain and neck stiffness. Skin: Positive for wound. Post surgical site c/d/i well healed   Allergic/Immunologic: Negative for environmental allergies and food allergies. Neurological: Positive for weakness and numbness.         Poor  strength right side  Numbness in radial nerve distribution Hematological: Negative for adenopathy. Psychiatric/Behavioral: Negative for agitation. PHYSICAL EXAM   (up to 7 for level 4, 8 or more for level 5)      INITIAL VITALS:   There were no vitals taken for this visit. Physical Exam  Vitals and nursing note reviewed. Constitutional:       General: He is not in acute distress. Appearance: Normal appearance. He is not toxic-appearing. HENT:      Head: Normocephalic and atraumatic. Right Ear: External ear normal.      Nose: Nose normal.      Mouth/Throat:      Pharynx: Oropharynx is clear. Eyes:      Conjunctiva/sclera: Conjunctivae normal.   Cardiovascular:      Rate and Rhythm: Normal rate. Pulses: Normal pulses. Pulmonary:      Effort: Pulmonary effort is normal.   Abdominal:      Palpations: Abdomen is soft. Tenderness: There is no abdominal tenderness. Musculoskeletal:         General: Tenderness, deformity and signs of injury present. Cervical back: Normal range of motion. Comments: Inability to extend about right elbow since surgery hypertonic flexor muscles, wrist drop, sensory loss radial distriubtuion   Skin:     General: Skin is warm. Neurological:      Mental Status: He is oriented to person, place, and time. Motor: Weakness present. Psychiatric:         Mood and Affect: Mood normal.         DIFFERENTIAL  DIAGNOSIS     PLAN (LABS / IMAGING / EKG):  Orders Placed This Encounter   Procedures    XR ELBOW RIGHT (MIN 3 VIEWS)    XR HUMERUS RIGHT (MIN 2 VIEWS)    Ice to affected area    Inpatient consult to Orthopedic Surgery       MEDICATIONS ORDERED:  Orders Placed This Encounter   Medications    lidocaine 4 % external patch 1 patch    lidocaine (LIDODERM) 5 %     Sig: Place 1 patch onto the skin daily 12 hours on, 12 hours off.      Dispense:  30 patch     Refill:  0       DDX: post op pain, hardware malfunction, muscle spasm    DIAGNOSTIC RESULTS / EMERGENCY DEPARTMENT COURSE / MDM   LAB RESULTS:  No results found for this visit on 10/30/21. IMPRESSION: Is an alert oriented nontoxic 20-year-old male with a history of right distal radius open reduction internal fixation status post fracture in the area with residual radial nerve injury. Complaining of pain worsening the last 4 to 5 days no acute trauma plan will be analgesia x-ray imaging orthopedic consultation anticipate discharge. no new sensory loss, no loss of pulses, no acute injury    RADIOLOGY:  XR HUMERUS RIGHT (MIN 2 VIEWS)    Result Date: 10/30/2021  EXAMINATION: THREE XRAY VIEWS OF THE RIGHT ELBOW; TWO XRAY VIEWS OF THE RIGHT HUMERUS 10/30/2021 3:33 pm COMPARISON: None. HISTORY: ORDERING SYSTEM PROVIDED HISTORY: post op pain TECHNOLOGIST PROVIDED HISTORY: post op pain FINDINGS: Right elbow: Status post internal fixation distal humerus. Anatomic alignment. No acute fracture. Fracture fragments approximated. Right humerus: Status post screw plate fixation distal humerus. Fracture fragments well approximated. No acute fracture. Periosteal reaction seen at the fracture site. Stable appearance status post ORIF distal humerus, periosteal reaction seen at the fracture site No acute bony or joint abnormality     XR ELBOW RIGHT (MIN 3 VIEWS)    Result Date: 10/30/2021  EXAMINATION: THREE XRAY VIEWS OF THE RIGHT ELBOW; TWO XRAY VIEWS OF THE RIGHT HUMERUS 10/30/2021 3:33 pm COMPARISON: None. HISTORY: ORDERING SYSTEM PROVIDED HISTORY: post op pain TECHNOLOGIST PROVIDED HISTORY: post op pain FINDINGS: Right elbow: Status post internal fixation distal humerus. Anatomic alignment. No acute fracture. Fracture fragments approximated. Right humerus: Status post screw plate fixation distal humerus. Fracture fragments well approximated. No acute fracture. Periosteal reaction seen at the fracture site.      Stable appearance status post ORIF distal humerus, periosteal reaction seen at the fracture site No acute bony or joint abnormality EKG  none    All EKG's are interpreted by the Emergency Department Physician who either signs or Co-signs this chart in the absence of a cardiologist.    EMERGENCY DEPARTMENT COURSE:  Seen and evaluated provide analgesia x-ray imaging demonstrates stable hardware without any new injuries seen and evaluated by orthopedics recommending outpatient follow-up provided Lidoderm patch at time of discharge    PROCEDURES:  none    CONSULTS:  [address]:  none    FINAL IMPRESSION      1. Right arm pain          DISPOSITION / PLAN     DISPOSITION  dc      PATIENT REFERRED TO:  [facility]  [address] [facility] [address]  [phone]  Go to   If symptoms worsen, As needed    Dorita Laureano, [address] [place] [address]  [phone]    Call today  for followup and reevaluation in 1-2 days      DISCHARGE MEDICATIONS:  New Prescriptions    LIDOCAINE (LIDODERM) 5 %    Place 1 patch onto the skin daily 12 hours on, 12 hours off.        Young Guerrier DO  Emergency Medicine Resident    (Please note that portions of thisnote were completed with a voice recognition program.  Efforts were made to edit the dictations but occasionally words are mis-transcribed.)        Young Guerrier DO  Resident  10/30/21 9541

## 2021-10-30 NOTE — CONSULTS
Orthopedic Surgery Consult  (Dr. Johana Tsang)      CC/Reason for consult: Right arm pain    HPI:      The patient is a 27 y.o. male who presents to the ED with right arm pain s/p ORIF of a distal humerus fracture on 9/22/2021 with Dr. Johana Tsang. He is roughly 5 weeks out of his surgery. Patient states he has been doing well until approximately 4 nights ago when the pain began worsening and felt as though his wrist drop worsened, which presented post-operatively. He was able to extend his wrist approximately 30 degrees before, but now he can only do about 20, he demonstrates. He states that the pain is mostly on the lateral side of the right arm and radiates to the neck. Patient denies any recent trauma to the extremity. Patient has not been to his postoperative follow-up appointment with Dr. Johana Tsang, nor has he seen any formal physical therapy. Patient endorses numbness to the back of the thumb and index finger, lateral aspect of his forearm, and back of his humerus. Past Medical History  Nava Oliver has a past medical history of Anxiety, Depression, and History of substance abuse ([facility] [address] Past Surgical History  Nava Oliver has a past surgical history that includes knee surgery; laparoscopic appendectomy (N/A, 01/08/2020); Humerus fracture surgery (Right, 09/22/2021); and Humerus fracture surgery (Right, 9/22/2021). Current Medications  Reviewed. See EMR for details. Allergies  Allergies reviewed: Patient has no known allergies. Social History  Patient reports that he has been smoking. He has never used smokeless tobacco. He reports previous drug use. Drugs: Opiates , IV, Marijuana, and Cocaine. He reports that he does not drink alcohol. Family History  Patient family history includes Hypertension in an other family member. ROS:   General: - LOC. CV: No chest pain. Resp: No SOB.   MSK: As per HPI  Neuro: Numbness of the right thumb  10 point ROS negative other than stated

## 2021-10-30 NOTE — ED PROVIDER NOTES
[facility]  Emergency Department  Faculty Attestation     I performed a history and physical examination of the patient and discussed management with the resident. I reviewed the residents note and agree with the documented findings and plan of care. Any areas of disagreement are noted on the chart. I was personally present for the key portions of any procedures. I have documented in the chart those procedures where I was not present during the key portions. I have reviewed the emergency nurses triage note. I agree with the chief complaint, past medical history, past surgical history, allergies, medications, social and family history as documented unless otherwise noted below. For Physician Assistant/ Nurse Practitioner cases/documentation I have personally evaluated this patient and have completed at least one if not all key elements of the E/M (history, physical exam, and MDM). Additional findings are as noted. Primary Care Physician:  No primary care provider on file. Screenings:  [unfilled]    CHIEF COMPLAINT       Chief Complaint   Patient presents with    Arm Pain     right arm. had sx 9/22. pain and numbness since. RECENT VITALS:   Temp: 97.9 °F (36.6 °C),  Pulse: 70, Resp: 16, BP: (!) 141/84    LABS:  Labs Reviewed - No data to display    Radiology  XR ELBOW RIGHT (MIN 3 VIEWS)    (Results Pending)   XR HUMERUS RIGHT (MIN 2 VIEWS)    (Results Pending)       Attending Physician Additional  Notes    Patient is over 1 month status post ORIF for comminuted displaced distal humerus fracture complicated by radial nerve injury. He had been doing well until approximately 4 nights ago when he developed severe pain in his lateral right arm which is now getting worse. There is been no trauma. No fever chills or sweats. No redness swelling or warmth in the arm or the elbow. He does have persistence radial nerve injury with wrist drop.   He has been taking simple analgesics for the pain. He is nauseated due to his severe pain. On exam he is uncomfortable, minimally hypertensive, elevated pain score. There is no warmth or erythema to the arm or elbow or forearm. Muscle compartments are soft. No bony tenderness. No crepitus with passive range of movement of the elbow. He does have weakness with dorsiflexion of the wrist and digits. Normal capillary refill. Normal sensation light touch in the digits but not in the forearm, this is been previously. Impression is severe arm pain, uncertain etiology, also radial nerve injury with palsy. .  Plan is analgesics, antiemetics, imaging, orthopedic consultation. Rodrigo Coleman.  Grace Beyer MD, [facility]  Attending Emergency  Physician               Chace Wright MD  10/30/21 0695

## 2021-11-19 ENCOUNTER — HOSPITAL ENCOUNTER (EMERGENCY)
Age: 26
Discharge: HOME | End: 2021-11-19
Attending: EMERGENCY MEDICINE
Payer: MEDICARE

## 2021-11-19 VITALS
DIASTOLIC BLOOD PRESSURE: 74 MMHG | HEIGHT: 70 IN | HEART RATE: 107 BPM | TEMPERATURE: 98.3 F | RESPIRATION RATE: 16 BRPM | BODY MASS INDEX: 33 KG/M2 | SYSTOLIC BLOOD PRESSURE: 105 MMHG | OXYGEN SATURATION: 96 %

## 2021-11-19 DIAGNOSIS — T40.601A OPIATE OVERDOSE, ACCIDENTAL OR UNINTENTIONAL, INITIAL ENCOUNTER (HCC): Primary | ICD-10-CM

## 2021-11-19 PROCEDURE — 93005 ELECTROCARDIOGRAM TRACING: CPT | Performed by: EMERGENCY MEDICINE

## 2021-11-19 PROCEDURE — 6360000002 HC RX W HCPCS: Performed by: EMERGENCY MEDICINE

## 2021-11-19 PROCEDURE — 99284 EMERGENCY DEPT VISIT MOD MDM: CPT

## 2021-11-19 RX ORDER — NALOXONE HYDROCHLORIDE 1 MG/ML
2 INJECTION INTRAMUSCULAR; INTRAVENOUS; SUBCUTANEOUS ONCE
Status: COMPLETED | OUTPATIENT
Start: 2021-11-19 | End: 2021-11-19

## 2021-11-19 RX ADMIN — NALOXONE HYDROCHLORIDE 2 MG: 1 INJECTION PARENTERAL at 03:20

## 2021-11-19 ASSESSMENT — ENCOUNTER SYMPTOMS
DIARRHEA: 0
VOMITING: 0
ABDOMINAL PAIN: 0
SORE THROAT: 0
CONSTIPATION: 0
NAUSEA: 0
COUGH: 0
BACK PAIN: 0
BLOOD IN STOOL: 0
SHORTNESS OF BREATH: 0
COLOR CHANGE: 0
TROUBLE SWALLOWING: 0

## 2021-11-19 NOTE — ED PROVIDER NOTES
[address]  EMERGENCY DEPARTMENT ENCOUNTER    Pt Name: Mily Palma  MRN: [id number]  YOB: 1995  Date of evaluation:11/19/21  PCP: No primary care provider on file. CHIEF COMPLAINT       Chief Complaint   Patient presents with    Drug Overdose       HISTORY OF PRESENT ILLNESS    Mily Palma is a 27 y.o. male who presents after an apparent overdose. Patient states that he was mad at his dad tonight, and using $10 worth of fentanyl. He states he snorted it. Also states he smoked marijuana. He denies any thoughts of suicide or homicide. He was not trying to hurt himself. States he does not feel right however he will not admit any other specific symptoms to me. EMS states that he mentioned that he needed to test \"dirty\" so he could get into rehab. No recent fevers, chills other illnesses. Symptoms are acute. Symptoms are moderate per nothing symptoms better or worse. Patient has no other complaints at this time. He did not receive any Narcan. REVIEW OF SYSTEMS       Review of Systems   Constitutional: Negative for chills, fatigue and fever. HENT: Negative for congestion, ear pain, sore throat and trouble swallowing. Eyes: Negative for visual disturbance. Respiratory: Negative for cough and shortness of breath. Cardiovascular: Negative for chest pain, palpitations and leg swelling. Gastrointestinal: Negative for abdominal pain, blood in stool, constipation, diarrhea, nausea and vomiting. Genitourinary: Negative for dysuria and flank pain. Musculoskeletal: Negative for arthralgias, back pain, myalgias and neck pain. Skin: Negative for color change, rash and wound. Neurological: Negative for dizziness, weakness, light-headedness, numbness and headaches. Psychiatric/Behavioral: Negative for confusion. All other systems reviewed and are negative. Negative in 10 essential Systems except as mentioned above and in the HPI.         PAST MEDICAL HISTORY Past Medical History:   Diagnosis Date    Anxiety     Depression     History of substance abuse ([facility] [place] [address]          SURGICAL HISTORY      has a past surgical history that includes knee surgery; laparoscopic appendectomy (N/A, 01/08/2020); Humerus fracture surgery (Right, 09/22/2021); and Humerus fracture surgery (Right, 9/22/2021). CURRENT MEDICATIONS       Previous Medications    ACETAMINOPHEN (TYLENOL) 500 MG TABLET    Take 2 tablets by mouth every 6 hours as needed for Pain    BUPROPION (WELLBUTRIN XL) 150 MG EXTENDED RELEASE TABLET    Take 150 mg by mouth every morning    BUSPIRONE (BUSPAR) 5 MG TABLET    Take 5 mg by mouth daily    DICLOFENAC SODIUM (VOLTAREN) 1 % GEL    Apply topically 2 times daily for 6 days    DOCUSATE SODIUM (COLACE) 100 MG CAPSULE    Take 1 capsule by mouth 2 times daily    GABAPENTIN (NEURONTIN) 100 MG CAPSULE    Take 1 capsule by mouth 3 times daily for 14 days. HYDROXYZINE (VISTARIL) 25 MG CAPSULE    Take 25 mg by mouth 3 times daily as needed for Anxiety    LIDOCAINE (LIDODERM) 5 %    Place 1 patch onto the skin daily 12 hours on, 12 hours off. NAPROXEN (NAPROSYN) 500 MG TABLET    Take 1 tablet by mouth 2 times daily (with meals)    SERTRALINE (ZOLOFT) 50 MG TABLET    Take 50 mg by mouth nightly    TRAZODONE (DESYREL) 100 MG TABLET    Take 100 mg by mouth nightly       ALLERGIES     is allergic to shellfish-derived products. FAMILY HISTORY     He indicated that the status of his other is unknown.     family history includes Hypertension in an other family member. SOCIAL HISTORY      reports that he has been smoking. He has never used smokeless tobacco. He reports previous drug use. Drugs: Opiates , IV, Marijuana (Cynthia Fleming), and Cocaine. He reports that he does not drink alcohol. PHYSICAL EXAM     INITIAL VITALS:  height is 5' 10\" (1.778 m). His oral temperature is 98.3 °F (36.8 °C). His blood pressure is 105/74 and his pulse is 107.  His respiration is 16 and oxygen interpretations:  No orders to display           ED BEDSIDE ULTRASOUND:      LABS:  Labs Reviewed - No data to display      EMERGENCY DEPARTMENT COURSE:   Vitals:    Vitals:    11/19/21 0308 11/19/21 0330 11/19/21 0400 11/19/21 0600   BP: 137/79 117/78 117/69 105/74   Pulse: 129 117 97 107   Resp: 16 16 12 16   Temp: 98.3 °F (36.8 °C)      TempSrc: Oral      SpO2: 94% 98% 97% 96%   Height: 5' 10\" (1.778 m)        6:23 AM EST  Patient is awake, alert, ambulating with a steady gait. We did give 1 dose of Narcan shortly after he got here however has not required any redosing of the Narcan. He is going to get a ride home. Advised to return if any symptoms worsen. He is agreeable plan will be discharged home today. CRITICALCARE:      CONSULTS:  None      PROCEDURES:      FINAL IMPRESSION      1. Opiate overdose, accidental or unintentional, initial encounter [facility])            DISPOSITION/PLAN   DISPOSITION            PATIENT REFERRED TO:  [facility] ED  [place] [id number] [phone]    As needed, If symptoms worsen      DISCHARGE MEDICATIONS:  New Prescriptions    NALOXONE HCL (NALOXONE OPIATE OVERDOSE KIT)    1 each by Nasal route once for 1 dose       The care is provided during an unprecedented national emergency due to the novel coronavirus, COVID-19.     (Please note that portions ofthis note were completed with a voice recognition program.  Efforts were made to edit the dictations but occasionally words are mis-transcribed.)    Bhanu Casanova DO  Attending Emergency Physician          Bhanu Casanova DO  11/19/21 2305

## 2021-11-19 NOTE — ED NOTES
Discharge papers reviewed with pt. Pt educated on medication and dosages. Pt instructed to follow-up with PCP/specialist and to return to ED if symptoms worsen or have any concerns. Pt verbalizes understanding. Pt ambulated out of ED with steady gait and all belongings.        Jacob Palomares RN  11/19/21 0588

## 2021-11-19 NOTE — ED TRIAGE NOTES
Pt was found dancing in streets police was called. Pt admits to using 10 dollars worth fentanyl by snorting it and 20-30 dollars of marijuana. Pt arrives drowsy, alert and oriented x4. Pt denies thoughts of self harm.

## 2021-11-21 LAB
EKG ATRIAL RATE: 114 BPM
EKG P AXIS: 52 DEGREES
EKG P-R INTERVAL: 146 MS
EKG Q-T INTERVAL: 338 MS
EKG QRS DURATION: 94 MS
EKG QTC CALCULATION (BAZETT): 465 MS
EKG R AXIS: 61 DEGREES
EKG T AXIS: 61 DEGREES
EKG VENTRICULAR RATE: 114 BPM

## 2021-11-21 PROCEDURE — 93010 ELECTROCARDIOGRAM REPORT: CPT | Performed by: INTERNAL MEDICINE

## 2021-12-24 ENCOUNTER — APPOINTMENT (OUTPATIENT)
Dept: GENERAL RADIOLOGY | Age: 26
End: 2021-12-24
Payer: MEDICARE

## 2021-12-24 ENCOUNTER — HOSPITAL ENCOUNTER (EMERGENCY)
Age: 26
Discharge: HOME | End: 2021-12-24
Attending: EMERGENCY MEDICINE
Payer: MEDICARE

## 2021-12-24 VITALS
WEIGHT: 240 LBS | TEMPERATURE: 97.8 F | BODY MASS INDEX: 34.36 KG/M2 | SYSTOLIC BLOOD PRESSURE: 140 MMHG | HEIGHT: 70 IN | DIASTOLIC BLOOD PRESSURE: 90 MMHG | OXYGEN SATURATION: 100 %

## 2021-12-24 DIAGNOSIS — M25.561 ACUTE PAIN OF RIGHT KNEE: Primary | ICD-10-CM

## 2021-12-24 PROCEDURE — 99283 EMERGENCY DEPT VISIT LOW MDM: CPT

## 2021-12-24 PROCEDURE — 73562 X-RAY EXAM OF KNEE 3: CPT

## 2021-12-24 PROCEDURE — 6370000000 HC RX 637 (ALT 250 FOR IP): Performed by: STUDENT IN AN ORGANIZED HEALTH CARE EDUCATION/TRAINING PROGRAM

## 2021-12-24 RX ORDER — IBUPROFEN 800 MG/1
800 TABLET ORAL ONCE
Status: COMPLETED | OUTPATIENT
Start: 2021-12-24 | End: 2021-12-24

## 2021-12-24 RX ORDER — LIDOCAINE 50 MG/G
OINTMENT TOPICAL
Qty: 50 G | Refills: 0 | Status: SHIPPED | OUTPATIENT
Start: 2021-12-24 | End: 2022-03-11

## 2021-12-24 RX ORDER — IBUPROFEN 800 MG/1
800 TABLET ORAL 2 TIMES DAILY PRN
Qty: 60 TABLET | Refills: 0 | Status: SHIPPED | OUTPATIENT
Start: 2021-12-24 | End: 2022-03-11

## 2021-12-24 RX ADMIN — IBUPROFEN 800 MG: 800 TABLET, FILM COATED ORAL at 22:33

## 2021-12-24 ASSESSMENT — PAIN DESCRIPTION - LOCATION: LOCATION: KNEE

## 2021-12-24 ASSESSMENT — PAIN DESCRIPTION - DESCRIPTORS: DESCRIPTORS: ACHING

## 2021-12-24 ASSESSMENT — PAIN SCALES - GENERAL: PAINLEVEL_OUTOF10: 6

## 2021-12-25 NOTE — ED PROVIDER NOTES
[facility] ED  Emergency Department Encounter  EmergencyMedicine Resident     Pt Name:Trevor Marlana Dakin  MRN: [id number]  [place] 1995  Date of evaluation: 12/25/21  PCP:  No primary care provider on file. CHIEF COMPLAINT       Chief Complaint   Patient presents with    Knee Pain     Started about 5 days ago        HISTORY OF PRESENT ILLNESS  (Location/Symptom, Timing/Onset, Context/Setting, Quality, Duration, Modifying Factors, Severity.)      Samuel Tijerina is a 27 y.o. male who presents with right knee pain. Patient reports history of prior knee surgery with ACL, MCL and meniscus reconstruction in 2010. States that over the past 2 to 3 days he has had increased popping sensation and worsening of pain in the right knee. States pain is made worse by walking downstairs. No specific alleviating factors. He does report some swelling over the knee as well. Denies any known specific injury to the knee recently    [facility] SURGICAL / SOCIAL / FAMILY HISTORY      has a past medical history of Anxiety, Depression, and History of substance abuse ([facility] [address] has a past surgical history that includes knee surgery; laparoscopic appendectomy (N/A, 01/08/2020); Humerus fracture surgery (Right, 09/22/2021); and Humerus fracture surgery (Right, 9/22/2021).       Social History     Socioeconomic History    Marital status: Single     Spouse name: Not on file    Number of children: Not on file    Years of education: Not on file    Highest education level: Not on file   Occupational History    Not on file   Tobacco Use    Smoking status: Current Every Day Smoker    Smokeless tobacco: Never Used   Vaping Use    Vaping Use: Former   Substance and Sexual Activity    Alcohol use: No    Drug use: Not Currently     Types: Opiates , IV, Marijuana (Azzie Leonid), Cocaine     Comment: fentanyl; sober 9/15/21    Sexual activity: Not on file   Other Topics Concern    Not on file   Social History Narrative    Not on file     Social Determinants of Health     Financial Resource Strain:     Difficulty of Paying Living Expenses: Not on file   Food Insecurity:     Worried About Running Out of Food in the Last Year: Not on file    [place] of Food in the Last Year: Not on file   Transportation Needs:     Lack of Transportation (Medical): Not on file    Lack of Transportation (Non-Medical): Not on file   Physical Activity:     Days of Exercise per Week: Not on file    Minutes of Exercise per Session: Not on file   Stress:     Feeling of Stress : Not on file   Social Connections:     Frequency of Communication with Friends and Family: Not on file    Frequency of Social Gatherings with Friends and Family: Not on file    Attends [employer] Services: Not on file    Active Member of [address] [place] [address] [employer] or Organizations: Not on file    Attends Club or Organization Meetings: Not on file    Marital Status: Not on file   Intimate Partner Violence:     Fear of Current or Ex-Partner: Not on file    Emotionally Abused: Not on file    Physically Abused: Not on file    Sexually Abused: Not on file   Housing Stability:     Unable to Pay for Housing in the Last Year: Not on file    Number of [place] in the Last Year: Not on file    Unstable Housing in the Last Year: Not on file       Family History   Problem Relation Age of Onset    Hypertension Other        Allergies:  Shellfish-derived products    Home Medications:  Prior to Admission medications    Medication Sig Start Date End Date Taking? Authorizing Provider   ibuprofen (ADVIL;MOTRIN) 800 MG tablet Take 1 tablet by mouth 2 times daily as needed for Pain 12/24/21  Yes Hakeem Saleh, DO   lidocaine (XYLOCAINE) 5 % ointment Apply topically as needed.  12/24/21  Yes Hakeem Saleh,    lidocaine (LIDODERM) 5 % Place 1 patch onto the skin daily 12 hours on, 12 hours off. 10/30/21   Ashlie Hammond, DO   acetaminophen (TYLENOL) 500 MG tablet Take 2 tablets by mouth every 6 hours as needed for Pain 9/22/21   Sabrina Mendez DO   docusate sodium (COLACE) 100 MG capsule Take 1 capsule by mouth 2 times daily 9/22/21   Sabrina Mendez DO   gabapentin (NEURONTIN) 100 MG capsule Take 1 capsule by mouth 3 times daily for 14 days. 9/22/21 10/6/21  Sabrina Mendez DO   naproxen (NAPROSYN) 500 MG tablet Take 1 tablet by mouth 2 times daily (with meals) 9/22/21   Sabirna Mendez DO   sertraline (ZOLOFT) 50 MG tablet Take 50 mg by mouth nightly    Historical Provider, MD   busPIRone (BUSPAR) 5 MG tablet Take 5 mg by mouth daily    Historical Provider, MD   buPROPion (WELLBUTRIN XL) 150 MG extended release tablet Take 150 mg by mouth every morning    Historical Provider, MD   traZODone (DESYREL) 100 MG tablet Take 100 mg by mouth nightly    Historical Provider, MD   hydrOXYzine (VISTARIL) 25 MG capsule Take 25 mg by mouth 3 times daily as needed for Anxiety    Historical Provider, MD   diclofenac sodium (VOLTAREN) 1 % GEL Apply topically 2 times daily for 6 days 3/22/21 3/28/21  Dano Rides, DO       REVIEW OF SYSTEMS    (2-9 systems for level 4, 10 or more for level 5)      Review of Systems   Musculoskeletal: Positive for arthralgias, gait problem and joint swelling. Right knee pain and swelling   Skin: Negative for rash and wound. Neurological: Negative for weakness and numbness. Psychiatric/Behavioral: Negative for confusion. PHYSICAL EXAM   (up to 7 for level 4, 8 or more for level 5)      INITIAL VITALS:   BP (!) 140/90   Temp 97.8 °F (36.6 °C)   Ht 5' 10\" (1.778 m)   Wt 240 lb (108.9 kg)   SpO2 100%   BMI 34.44 kg/m²     Physical Exam  Constitutional:       General: He is not in acute distress. Appearance: He is not toxic-appearing. Cardiovascular:      Rate and Rhythm: Normal rate. Pulmonary:      Effort: Pulmonary effort is normal.   Musculoskeletal:         General: Swelling and tenderness present.       Comments: Tenderness palpation of right knee fracture or dislocation. There is a small right knee joint effusion. There is tricompartmental degenerative osteoarthritis moderate to severe in the patellofemoral and lateral compartments. Moderate to severe tricompartmental degenerative osteoarthritis not significantly changed from 03/17/2021. Small right knee joint effusion. Osteoarthritis is likely posttraumatic. There is evidence of a remote ACL repair. CONSULTS:  None    CRITICAL CARE:  Please see attending note    FINAL IMPRESSION      1. Acute pain of right knee          DISPOSITION / PLAN     DISPOSITION Decision To Discharge 12/24/2021 10:11:34 PM      PATIENT REFERRED TO:  [address] [place] [address] [address] [address]  [phone]  Schedule an appointment as soon as possible for a visit   For re-evaluation with orthopedic surgery clinic      DISCHARGE MEDICATIONS:  Discharge Medication List as of 12/24/2021 10:14 PM      START taking these medications    Details   ibuprofen (ADVIL;MOTRIN) 800 MG tablet Take 1 tablet by mouth 2 times daily as needed for Pain, Disp-60 tablet, R-0Print      lidocaine (XYLOCAINE) 5 % ointment Apply topically as needed. , Disp-50 g, R-0, Print             Odalis Aguilar DO  Emergency Medicine Resident    (Please note that portions of thisnote were completed with a voice recognition program.  Efforts were made to edit the dictations but occasionally words are mis-transcribed.)        Odalis Aguilar DO  Resident  12/25/21 8649

## 2021-12-25 NOTE — ED NOTES
Patient arrived to ED with complaints of knee and wrist pain. Patient states that he had surgery on knee prior. Patient is alert and oriented, denies any pain at this time. Patient denies any nausea, vomiting, or diarrhea at this time. Per patient no SOB or CP at this time. Patient is oriented to room and call light, call light is within reach. Will continue to monitor.       Jam PalmerC[facility]nut [facility] Hospital  12/24/21 5193

## 2022-01-02 NOTE — ED PROVIDER NOTES
[facility]   Emergency Department  Faculty Attestation       I performed a history and physical examination of the patient and discussed management with the resident. I reviewed the residents note and agree with the documented findings including all diagnostic interpretations and plan of care. Any areas of disagreement are noted on the chart. I was personally present for the key portions of any procedures. I have documented in the chart those procedures where I was not present during the key portions. I have reviewed the emergency nurses triage note. I agree with the chief complaint, past medical history, past surgical history, allergies, medications, social and family history as documented unless otherwise noted below. Documentation of the HPI, Physical Exam and Medical Decision Making performed by scribkaren is based on my personal performance of the HPI, PE and MDM. For Physician Assistant/ Nurse Practitioner cases/documentation I have personally evaluated this patient and have completed at least one if not all key elements of the E/M (history, physical exam, and MDM). Additional findings are as noted. Pertinent Comments     Primary Care Physician: No primary care provider on file. ED Triage Vitals [12/24/21 2010]   BP Temp Temp src Pulse Resp SpO2 Height Weight   (!) 140/90 97.8 °F (36.6 °C) -- -- -- 100 % 5' 10\" (1.778 m) 240 lb (108.9 kg)        History/Physical: This is a 27 y.o. male who presents to the Emergency Department with complaint of right knee pain. Patient has a h/o surgery on ACL, MCL, and meniscus ~ 11 years ago. Over the past few days now experiencing a popping noise and pain in the knee. Made worse by going down stairs. No recent falls. No fevers. Also complaining of weakness in the right wrist joint after having prior humerus ORIF on 9/22/21    On exam the patient is sitting comfortably in the room in NAD. NC/AT. Distal pulses intact to all 4 extremities. Right knee with mild to moderate edema. No erythema. However associated tenderness. Has mild decreased flexion and extend secondary to pain. There is no laxity joint. Mild tenderness to the middle joint line. Right upper arm is healing from the prior injury. Neuro exam has ongoing sensation of lower extremities. Upper extremity does have a wrist drop after isolating wrist, and has normal motion of digits. MDM/Plan:   Knee pain - old injury with surgery with new swelling and pain  There is some swelling and tenderness. No signs of infected joint. Xray  Also has wrist drop in right arm persistent since surgery in September.    Wrist splint provided  F/u w/ ortho      Critical Care: None     Zack Sanchez MD  Attending Emergency Physician        Zack Sanchez MD  01/02/22 9174

## 2022-03-11 ENCOUNTER — HOSPITAL ENCOUNTER (EMERGENCY)
Age: 27
Discharge: HOME | End: 2022-03-11
Attending: EMERGENCY MEDICINE
Payer: MEDICARE

## 2022-03-11 VITALS
WEIGHT: 210 LBS | SYSTOLIC BLOOD PRESSURE: 157 MMHG | RESPIRATION RATE: 15 BRPM | OXYGEN SATURATION: 95 % | DIASTOLIC BLOOD PRESSURE: 111 MMHG | TEMPERATURE: 98.4 F | HEART RATE: 66 BPM | BODY MASS INDEX: 30.13 KG/M2

## 2022-03-11 DIAGNOSIS — T50.901A ACCIDENTAL DRUG OVERDOSE, INITIAL ENCOUNTER: Primary | ICD-10-CM

## 2022-03-11 LAB
AMPHETAMINE SCREEN URINE: POSITIVE
ANION GAP SERPL CALCULATED.3IONS-SCNC: 14 MMOL/L (ref 9–17)
BARBITURATE SCREEN URINE: NEGATIVE
BENZODIAZEPINE SCREEN, URINE: NEGATIVE
BUN BLDV-MCNC: 28 MG/DL (ref 6–20)
CALCIUM SERPL-MCNC: 9.4 MG/DL (ref 8.6–10.4)
CANNABINOID SCREEN URINE: NEGATIVE
CHLORIDE BLD-SCNC: 104 MMOL/L (ref 98–107)
CO2: 23 MMOL/L (ref 20–31)
COCAINE METABOLITE, URINE: POSITIVE
CREAT SERPL-MCNC: 0.89 MG/DL (ref 0.7–1.2)
GFR AFRICAN AMERICAN: >60 ML/MIN
GFR NON-AFRICAN AMERICAN: >60 ML/MIN
GFR SERPL CREATININE-BSD FRML MDRD: ABNORMAL ML/MIN/{1.73_M2}
GLUCOSE BLD-MCNC: 122 MG/DL (ref 70–99)
METHADONE SCREEN, URINE: NEGATIVE
OPIATES, URINE: NEGATIVE
OXYCODONE SCREEN URINE: NEGATIVE
PHENCYCLIDINE, URINE: NEGATIVE
POTASSIUM SERPL-SCNC: 3.7 MMOL/L (ref 3.7–5.3)
SODIUM BLD-SCNC: 141 MMOL/L (ref 135–144)
TEST INFORMATION: ABNORMAL

## 2022-03-11 PROCEDURE — 80307 DRUG TEST PRSMV CHEM ANLYZR: CPT

## 2022-03-11 PROCEDURE — 99285 EMERGENCY DEPT VISIT HI MDM: CPT

## 2022-03-11 PROCEDURE — 80048 BASIC METABOLIC PNL TOTAL CA: CPT

## 2022-03-11 PROCEDURE — 93005 ELECTROCARDIOGRAM TRACING: CPT | Performed by: STUDENT IN AN ORGANIZED HEALTH CARE EDUCATION/TRAINING PROGRAM

## 2022-03-11 RX ORDER — NALOXONE HYDROCHLORIDE 0.4 MG/ML
INJECTION, SOLUTION INTRAMUSCULAR; INTRAVENOUS; SUBCUTANEOUS
Status: DISCONTINUED
Start: 2022-03-11 | End: 2022-03-11 | Stop reason: HOSPADM

## 2022-03-11 RX ORDER — BUPRENORPHINE AND NALOXONE 8; 2 MG/1; MG/1
1 FILM, SOLUBLE BUCCAL; SUBLINGUAL DAILY
COMMUNITY

## 2022-03-11 RX ORDER — NALOXONE HYDROCHLORIDE 1 MG/ML
INJECTION INTRAMUSCULAR; INTRAVENOUS; SUBCUTANEOUS
Status: DISCONTINUED
Start: 2022-03-11 | End: 2022-03-11 | Stop reason: HOSPADM

## 2022-03-11 NOTE — ED NOTES
Patient presents to room 3, ambulatory with EMS  Patient was found unresponsive on ground  Patient was given 2mg Narcan IN by EMS, had pin point pupils  Patient verbalizes he has a tooth ache and someone gave him a pill that was probably Fentynal  Patient has been on Suboxone x 3 weeks  Prior drug use of crack and weed, last use 3 weeks ago  Patient a/o at this time     Deb Jaramillo, JENNIFER  03/11/22 8320

## 2022-03-11 NOTE — ED PROVIDER NOTES
[facility]  Emergency Department  Faculty Attestation     I performed a history and physical examination of the patient and discussed management with the resident. I reviewed the residents note and agree with the documented findings and plan of care. Any areas of disagreement are noted on the chart. I was personally present for the key portions of any procedures. I have documented in the chart those procedures where I was not present during the key portions. I have reviewed the emergency nurses triage note. I agree with the chief complaint, past medical history, past surgical history, allergies, medications, social and family history as documented unless otherwise noted below. For Physician Assistant/ Nurse Practitioner cases/documentation I have personally evaluated this patient and have completed at least one if not all key elements of the E/M (history, physical exam, and MDM). Additional findings are as noted. Primary Care Physician:  No primary care provider on file. Screenings:  [unfilled]    CHIEF COMPLAINT       Chief Complaint   Patient presents with    Altered Mental Status     found unresponsive on ground after taking a pill       RECENT VITALS:    ,  Pulse: 100, Resp: 12,      LABS:  Labs Reviewed   BASIC METABOLIC PANEL W/ REFLEX TO MG FOR LOW K       Radiology  No orders to display         EKG:   EKG Interpretation    Interpreted by me    Rhythm: normal sinus   Rate: normal  Axis: normal  Ectopy: none  Conduction: normal  ST Segments: no acute change  T Waves: no acute change  Q Waves: none    Clinical Impression: no acute changes and normal EKG    Attending Physician Additional  Notes    Patient is brought by EMS for probable accidental opioid overdose. He asked a friend for something for his tooth ache and was given a white tablet.   Patient then passed out and was awakened by medics after receiving Narcan for respiratory depression and opioid toxidrome. He is normally on Suboxone which he took today. On exam he is now tachycardic, awake, slightly anxious or agitated, slightly hypertensive, no respiratory distress. Normal pupils. GCS is 15. No respiratory distress. Skin is warm and dry. Neurologic exam is nonfocal.  Impression is accidental opioid overdose likely fentanyl, chronic buprenorphine use, tooth ache. Plan is brief observation, dental exam, probable antibiotics, dental follow-up, topical anesthetic, simple analgesics, reassess, disposition          Ruba Moon.  Vidya Griffin MD, [facility]  Attending Emergency  Physician               Saint Cirri, MD  03/11/22 9233

## 2022-03-11 NOTE — ED NOTES
Pts IV was dressed, extension was tightened and flushed. Hand was cleansed.       Kasey Viera RN  03/11/22 9493

## 2022-03-11 NOTE — ED NOTES
Pt resting comfortably in no acute distress. Respirations even and unlabored. Call light remains within reach. No needs at this time.        Heron Hernandez RN  03/11/22 0226

## 2022-03-11 NOTE — ED PROVIDER NOTES
[facility] ED  Emergency Department Encounter  EmergencyMedicine Resident     Pt Name:Suraj Gutierrez  MRN: [id number]  [place] 1995  Date of evaluation: 3/11/22  PCP:  No primary care provider on file. This patient was evaluated in the Emergency Department for symptoms described in the history of present illness. The patient was evaluated in the context of the global COVID-19 pandemic, which necessitated consideration that the patient might be at risk for infection with the SARS-CoV-2 virus that causes COVID-19. Institutional protocols and algorithms that pertain to the evaluation of patients at risk for COVID-19 are in a state of rapid change based on information released by regulatory bodies including the CDC and federal and state organizations. These policies and algorithms were followed during the patient's care in the ED. CHIEF COMPLAINT       Chief Complaint   Patient presents with    Altered Mental Status     found unresponsive on ground after taking a pill       HISTORY OF PRESENT ILLNESS  (Location/Symptom, Timing/Onset, Context/Setting, Quality, Duration, Modifying Factors, Severity.)      Jeffrey Cheadle is a 32 y.o. male who presents with overdose. Patient reports that he has a history of drug use, has been clean, on Suboxone, had a tooth ache, talked to his friend who gave him a pill for pain, believes that it was fentanyl, does not remember anything after that. EMS was called for patient that was unresponsive, found him unresponsive, administered Narcan, improvement in symptoms. Patient has no complaints other than the tooth ache at this time. Patient reports that he has had this left lower molar pain for days to weeks, has not followed up with dentist.  Patient denies fevers, chills, vision changes, hearing changes, neck pain, facial pain, difficulty swallowing, difficulty breathing, chest pain, shortness breath, abdominal pain, nausea, vomiting, diarrhea. Patient reports that he has good follow-up, does not need social work evaluation. PAST MEDICAL / SURGICAL / SOCIAL / FAMILY HISTORY      has a past medical history of Anxiety, Depression, and History of substance abuse ([facility] [address] has a past surgical history that includes knee surgery; laparoscopic appendectomy (N/A, 01/08/2020); Humerus fracture surgery (Right, 09/22/2021); and Humerus fracture surgery (Right, 9/22/2021). Social History     Socioeconomic History    Marital status: Single     Spouse name: Not on file    Number of children: Not on file    Years of education: Not on file    Highest education level: Not on file   Occupational History    Not on file   Tobacco Use    Smoking status: Current Every Day Smoker    Smokeless tobacco: Never Used   Vaping Use    Vaping Use: Former   Substance and Sexual Activity    Alcohol use: Not Currently     Comment: none since 2017    Drug use: Not Currently     Types: Opiates , IV, Marijuana (Thyra Ellie), Cocaine     Comment: last time 3 weeks ago crack and weed    Sexual activity: Not on file   Other Topics Concern    Not on file   Social History Narrative    Not on file     Social Determinants of Health     Financial Resource Strain:     Difficulty of Paying Living Expenses: Not on file   Food Insecurity:     Worried About Running Out of Food in the Last Year: Not on file    [place] of Food in the Last Year: Not on file   Transportation Needs:     Lack of Transportation (Medical): Not on file    Lack of Transportation (Non-Medical):  Not on file   Physical Activity:     Days of Exercise per Week: Not on file    Minutes of Exercise per Session: Not on file   Stress:     Feeling of Stress : Not on file   Social Connections:     Frequency of Communication with Friends and Family: Not on file    Frequency of Social Gatherings with Friends and Family: Not on file    Attends [employer] Services: Not on file    Active Member of Clubs or Organizations: Not on file    Attends Club or Organization Meetings: Not on file    Marital Status: Not on file   Intimate Partner Violence:     Fear of Current or Ex-Partner: Not on file    Emotionally Abused: Not on file    Physically Abused: Not on file    Sexually Abused: Not on file   Housing Stability:     Unable to Pay for Housing in the Last Year: Not on file    Number of [place] in the Last Year: Not on file    Unstable Housing in the Last Year: Not on file       Family History   Problem Relation Age of Onset    Hypertension Other        Allergies:  Shellfish-derived products    Home Medications:  Prior to Admission medications    Medication Sig Start Date End Date Taking? Authorizing Provider   buprenorphine-naloxone (SUBOXONE) 8-2 MG FILM SL film Place 1 Film under the tongue daily. Yes Historical Provider, MD   gabapentin (NEURONTIN) 400 MG capsule Take 400 mg by mouth 3 times daily. Historical Provider, MD       REVIEW OF SYSTEMS    (2-9 systems for level 4, 10 or more for level 5)      Review of Systems   Constitutional: Negative for chills, diaphoresis, fatigue and fever. HENT: Positive for dental problem. Negative for congestion, drooling, ear discharge, ear pain, facial swelling, hearing loss, rhinorrhea and sore throat. Eyes: Negative for visual disturbance. Respiratory: Negative for cough and shortness of breath. Cardiovascular: Negative for chest pain. Gastrointestinal: Negative for abdominal pain, blood in stool, constipation, diarrhea, nausea and vomiting. Genitourinary: Negative for dysuria and hematuria. Musculoskeletal: Negative for neck pain and neck stiffness. Skin: Negative for pallor and rash. Neurological: Negative for dizziness, seizures, syncope, facial asymmetry, weakness, light-headedness, numbness and headaches. Psychiatric/Behavioral: Negative for confusion.        PHYSICAL EXAM   (up to 7 for level 4, 8 or more for level 5)      INITIAL VITALS:   BP (!) 157/111   Pulse 66   Temp 98.4 °F (36.9 °C) (Oral)   Resp 15   Wt 210 lb (95.3 kg)   SpO2 95%   BMI 30.13 kg/m²     Physical Exam  Constitutional:       General: He is not in acute distress. Appearance: He is well-developed. He is not ill-appearing, toxic-appearing or diaphoretic. Comments: Patient is drowsy on entrance to the room, but easily arousable, oriented, answering questions appropriately, maintaining his respiratory status   HENT:      Head: Normocephalic and atraumatic. Right Ear: External ear normal.      Left Ear: External ear normal.      Nose: Nose normal. No congestion or rhinorrhea. Mouth/Throat:      Mouth: Mucous membranes are moist.      Pharynx: Oropharynx is clear. No oropharyngeal exudate or posterior oropharyngeal erythema. Comments: Patient has subacute to chronic looking broken tooth of the left lower posterior molar, tooth #17, no active bleeding, no surrounding erythema, no abscess, no deviated uvula, no RPA, no PTA, no sublingual masses  Eyes:      General:         Right eye: No discharge. Left eye: No discharge. Extraocular Movements: Extraocular movements intact. Pupils: Pupils are equal, round, and reactive to light. Neck:      Vascular: No JVD. Trachea: No tracheal deviation. Cardiovascular:      Rate and Rhythm: Normal rate and regular rhythm. Pulses: Normal pulses. Heart sounds: Normal heart sounds. No murmur heard. No friction rub. No gallop. Pulmonary:      Effort: Pulmonary effort is normal. No respiratory distress. Breath sounds: Normal breath sounds. No stridor. No wheezing, rhonchi or rales. Chest:      Chest wall: No tenderness. Abdominal:      General: There is no distension. Palpations: Abdomen is soft. There is no mass. Tenderness: There is no abdominal tenderness. There is no right CVA tenderness, left CVA tenderness or guarding. Musculoskeletal:         General: No tenderness. Normal range of motion. Cervical back: Normal range of motion and neck supple. No rigidity or tenderness. Right lower leg: No edema. Left lower leg: No edema. Skin:     General: Skin is warm. Capillary Refill: Capillary refill takes less than 2 seconds. Neurological:      Mental Status: He is oriented to person, place, and time. Cranial Nerves: No cranial nerve deficit. Sensory: No sensory deficit. Motor: No weakness.       Coordination: Coordination normal.      Gait: Gait normal.   Psychiatric:         Mood and Affect: Mood normal.         Behavior: Behavior normal.         DIFFERENTIAL  DIAGNOSIS     PLAN (LABS / IMAGING / EKG):  Orders Placed This Encounter   Procedures    Basic Metabolic Panel w/ Reflex to MG    Urine Drug Screen    EKG 12 Lead       MEDICATIONS ORDERED:  Orders Placed This Encounter   Medications    DISCONTD: naloxone (NARCAN) 0.4 MG/ML injection     SayedFay: cabinet override    DISCONTD: naloxone [place] Reap) 2 MG/2ML injection     Sayed, Fay: cabinet override       DDX: Drug overdose, dental pain    DIAGNOSTIC RESULTS / [address] [place] [address] / [facility]   LAB RESULTS:  Results for orders placed or performed during the hospital encounter of 96/63/92   Basic Metabolic Panel w/ Reflex to MG   Result Value Ref Range    Glucose 122 (H) 70 - 99 mg/dL    BUN 28 (H) 6 - 20 mg/dL    CREATININE 0.89 0.70 - 1.20 mg/dL    Calcium 9.4 8.6 - 10.4 mg/dL    Sodium 141 135 - 144 mmol/L    Potassium 3.7 3.7 - 5.3 mmol/L    Chloride 104 98 - 107 mmol/L    CO2 23 20 - 31 mmol/L    Anion Gap 14 9 - 17 mmol/L    GFR Non-African American >60 >60 mL/min    GFR African American >60 >60 mL/min    GFR Comment         Urine Drug Screen   Result Value Ref Range    Amphetamine Screen, Ur POSITIVE (A) NEGATIVE    Barbiturate Screen, Ur NEGATIVE NEGATIVE    Benzodiazepine Screen, Urine NEGATIVE NEGATIVE    Cocaine Metabolite, Urine POSITIVE (A) NEGATIVE    Methadone Screen, Urine NEGATIVE NEGATIVE    Opiates, Urine NEGATIVE NEGATIVE    Phencyclidine, Urine NEGATIVE NEGATIVE    Cannabinoid Scrn, Ur NEGATIVE NEGATIVE    Oxycodone Screen, Ur NEGATIVE NEGATIVE    Test Information       Assay provides medical screening only. The absence of expected drug(s) and/or metabolite(s) may indicate diluted or adulterated urine, limitations of testing or timing of collection. EKG 12 Lead   Result Value Ref Range    Ventricular Rate 99 BPM    Atrial Rate 99 BPM    P-R Interval 154 ms    QRS Duration 104 ms    Q-T Interval 348 ms    QTc Calculation (Bazett) 446 ms    P Axis 46 degrees    R Axis 44 degrees    T Axis 52 degrees       IMPRESSION: 29-year-old male with history of drug use, took an unknown substance, became unresponsive, responded to Narcan, patient is drowsy, but pupils are 2 mm and equally reactive bilaterally, maintaining his airway, will obtain EKG, basic labs to evaluate for electrolyte abnormality, interval changes. Will observe in the emergency department and reevaluate    RADIOLOGY:      EKG  EKG Interpretation    Interpreted by me    Rhythm: normal sinus   Rate: normal  Axis: normal  Ectopy: none  Conduction: normal  ST Segments: no acute change  T Waves: no acute change  Q Waves: none    Clinical Impression: no acute changes and normal EKG    All EKG's are interpreted by the Emergency Department Physician who either signs or Co-signs this chart in the absence of a cardiologist.    EMERGENCY DEPARTMENT COURSE:  Patient came to emergency department, HPI and physical exam were conducted. All nursing notes were reviewed. Patient remained stable in the emergency department, maintaining airway, vital signs stable, continues to be mildly drowsy. Labs largely unremarkable. Patient reports that he has to leave, still drowsy, but reports that he is just tired.   Patient has capacity, answering questions appropriately, will have patient leave [address] as would like to have patient more alert prior to discharge. Will provide Narcan. Patient is requesting urine drug screen, will provide this for patient per request.    The patient is clinically not intoxicated, free from distracting pain, appears to have intact insight, judgment and reason and in my medical opinion has the capacity to make decisions. The patient is also not under any duress to leave the hospital. In this scenario, it would be battery to subject a patient to treatment against his/her will. I have voiced my concerns for the patient's health given that a full evaluation and treatment had not occurred. I have discussed the need for continued evaluation to determine if their symptoms are caused by a condition that present risk of death or morbidity. Risks including but not limited to death, permanent disability, prolonged hospitalization, prolonged illness, were discussed. I tried offering alternative options in hopes that the patient might be amenable to partial evaluation and treatment which would be medically beneficial to the patient, though the patient declined my options and insisted on leaving. Because I have been unable to convince the patient to stay, I answered all of their questions about their condition and asked them to return to the ED as soon as possible to complete their evaluation, especially if their symptoms worsen or do not improve. I emphasized that leaving against medical advice does not preclude returning here for further evaluation. I asked the patient to return if they change their mind about the further evaluation and treatment. I strongly encouraged the patient to return to this Emergency Department or any Emergency Department at any time, particularly with worsening symptoms. PROCEDURES:      CONSULTS:  None    CRITICAL CARE:      FINAL IMPRESSION      1.  Accidental drug overdose, initial encounter          DISPOSITION / [address] 03/11/2022 09:04:41 PM      PATIENT REFERRED TO:  [facility]  [address] [facility] [address]  [phone]  Go to   As needed, If symptoms worsen    [facility]  [address] [facility] [address]  [phone]  Schedule an appointment as soon as possible for a visit in 1 day  If symptoms worsen      DISCHARGE MEDICATIONS:  Discharge Medication List as of 3/11/2022  9:04 PM          Robyn Beck MD  Emergency Medicine Resident    (Please note that portions of thisnote were completed with a voice recognition program.  Efforts were made to edit the dictations but occasionally words are mis-transcribed.)        Robyn Beck MD  Resident  03/12/22 [id number]

## 2022-03-11 NOTE — ED NOTES
Pt resting comfortably in no acute distress. Respirations even and unlabored. Call light remains within reach. No needs at this time.        Judith Arias RN  03/11/22 [id number]

## 2022-03-12 ENCOUNTER — HOSPITAL ENCOUNTER (EMERGENCY)
Age: 27
Discharge: HOME | End: 2022-03-12
Attending: EMERGENCY MEDICINE
Payer: MEDICARE

## 2022-03-12 ENCOUNTER — APPOINTMENT (OUTPATIENT)
Dept: GENERAL RADIOLOGY | Age: 27
End: 2022-03-12
Payer: MEDICARE

## 2022-03-12 VITALS
HEART RATE: 80 BPM | OXYGEN SATURATION: 94 % | RESPIRATION RATE: 15 BRPM | BODY MASS INDEX: 32.93 KG/M2 | SYSTOLIC BLOOD PRESSURE: 149 MMHG | DIASTOLIC BLOOD PRESSURE: 83 MMHG | HEIGHT: 70 IN | TEMPERATURE: 97.4 F | WEIGHT: 230 LBS

## 2022-03-12 DIAGNOSIS — R07.9 CHEST PAIN, UNSPECIFIED TYPE: Primary | ICD-10-CM

## 2022-03-12 LAB
ABSOLUTE EOS #: 0.19 K/UL (ref 0–0.44)
ABSOLUTE IMMATURE GRANULOCYTE: <0.03 K/UL (ref 0–0.3)
ABSOLUTE LYMPH #: 1.42 K/UL (ref 1.1–3.7)
ABSOLUTE MONO #: 0.57 K/UL (ref 0.1–1.2)
ANION GAP SERPL CALCULATED.3IONS-SCNC: 13 MMOL/L (ref 9–17)
BASOPHILS # BLD: 1 % (ref 0–2)
BASOPHILS ABSOLUTE: 0.03 K/UL (ref 0–0.2)
BUN BLDV-MCNC: 20 MG/DL (ref 6–20)
CALCIUM SERPL-MCNC: 9.6 MG/DL (ref 8.6–10.4)
CHLORIDE BLD-SCNC: 100 MMOL/L (ref 98–107)
CO2: 23 MMOL/L (ref 20–31)
CREAT SERPL-MCNC: 0.73 MG/DL (ref 0.7–1.2)
D-DIMER QUANTITATIVE: 0.46 MG/L FEU
EKG ATRIAL RATE: 99 BPM
EKG P AXIS: 46 DEGREES
EKG P-R INTERVAL: 154 MS
EKG Q-T INTERVAL: 348 MS
EKG QRS DURATION: 104 MS
EKG QTC CALCULATION (BAZETT): 446 MS
EKG R AXIS: 44 DEGREES
EKG T AXIS: 52 DEGREES
EKG VENTRICULAR RATE: 99 BPM
EOSINOPHILS RELATIVE PERCENT: 3 % (ref 1–4)
GFR AFRICAN AMERICAN: >60 ML/MIN
GFR NON-AFRICAN AMERICAN: >60 ML/MIN
GFR SERPL CREATININE-BSD FRML MDRD: ABNORMAL ML/MIN/{1.73_M2}
GLUCOSE BLD-MCNC: 110 MG/DL (ref 70–99)
HCT VFR BLD CALC: 42.6 % (ref 40.7–50.3)
HEMOGLOBIN: 14.1 G/DL (ref 13–17)
IMMATURE GRANULOCYTES: 0 %
LYMPHOCYTES # BLD: 24 % (ref 24–43)
MCH RBC QN AUTO: 28.7 PG (ref 25.2–33.5)
MCHC RBC AUTO-ENTMCNC: 33.1 G/DL (ref 28.4–34.8)
MCV RBC AUTO: 86.8 FL (ref 82.6–102.9)
MONOCYTES # BLD: 10 % (ref 3–12)
NRBC AUTOMATED: 0 PER 100 WBC
PDW BLD-RTO: 14 % (ref 11.8–14.4)
PLATELET # BLD: 213 K/UL (ref 138–453)
PMV BLD AUTO: 10.5 FL (ref 8.1–13.5)
POTASSIUM SERPL-SCNC: 4 MMOL/L (ref 3.7–5.3)
RBC # BLD: 4.91 M/UL (ref 4.21–5.77)
SEG NEUTROPHILS: 62 % (ref 36–65)
SEGMENTED NEUTROPHILS ABSOLUTE COUNT: 3.67 K/UL (ref 1.5–8.1)
SODIUM BLD-SCNC: 136 MMOL/L (ref 135–144)
TROPONIN, HIGH SENSITIVITY: <6 NG/L (ref 0–22)
WBC # BLD: 5.9 K/UL (ref 3.5–11.3)

## 2022-03-12 PROCEDURE — 85025 COMPLETE CBC W/AUTO DIFF WBC: CPT

## 2022-03-12 PROCEDURE — 71045 X-RAY EXAM CHEST 1 VIEW: CPT

## 2022-03-12 PROCEDURE — 93005 ELECTROCARDIOGRAM TRACING: CPT | Performed by: STUDENT IN AN ORGANIZED HEALTH CARE EDUCATION/TRAINING PROGRAM

## 2022-03-12 PROCEDURE — 84484 ASSAY OF TROPONIN QUANT: CPT

## 2022-03-12 PROCEDURE — 85379 FIBRIN DEGRADATION QUANT: CPT

## 2022-03-12 PROCEDURE — 80048 BASIC METABOLIC PNL TOTAL CA: CPT

## 2022-03-12 PROCEDURE — 99284 EMERGENCY DEPT VISIT MOD MDM: CPT

## 2022-03-12 PROCEDURE — 93970 EXTREMITY STUDY: CPT

## 2022-03-12 RX ORDER — GABAPENTIN 400 MG/1
400 CAPSULE ORAL 3 TIMES DAILY
COMMUNITY

## 2022-03-12 ASSESSMENT — ENCOUNTER SYMPTOMS
DIARRHEA: 0
VOMITING: 0
COUGH: 0
ABDOMINAL PAIN: 0
SINUS PRESSURE: 0
SINUS PAIN: 0
SHORTNESS OF BREATH: 1
SORE THROAT: 0
SORE THROAT: 0
BLOOD IN STOOL: 0
EYE PAIN: 0
DIARRHEA: 0
FACIAL SWELLING: 0
CONSTIPATION: 0
COUGH: 0
NAUSEA: 0
RHINORRHEA: 0
NAUSEA: 0
EYE ITCHING: 0
SHORTNESS OF BREATH: 0
ABDOMINAL PAIN: 0
ABDOMINAL DISTENTION: 0
CONSTIPATION: 0

## 2022-03-12 NOTE — ED PROVIDER NOTES
[address] [facility]     Emergency Department     Faculty Attestation    I performed a history and physical examination of the patient and discussed management with the resident. I reviewed the residents note and agree with the documented findings including all diagnostic interpretations and plan of care. Any areas of disagreement are noted on the chart. I was personally present for the key portions of any procedures. I have documented in the chart those procedures where I was not present during the key portions. I have reviewed the emergency nurses triage note. I agree with the chief complaint, past medical history, past surgical history, allergies, medications, social and family history as documented unless otherwise noted below. Documentation of the HPI, Physical Exam and Medical Decision Making performed by scribkaren is based on my personal performance of the HPI, PE and MDM. For Physician Assistant/ Nurse Practitioner cases/documentation I have personally evaluated this patient and have completed at least one if not all key elements of the E/M (history, physical exam, and MDM). Additional findings are as noted. This patient was evaluated in the Emergency Department for symptoms described in the history of present illness. He/she was evaluated in the context of the global COVID-19 pandemic, which necessitated consideration that the patient might be at risk for infection with the SARS-CoV-2 virus that causes COVID-19. Institutional protocols and algorithms that pertain to the evaluation of patients at risk for COVID-19 are in a state of rapid change based on information released by regulatory bodies including the CDC and federal and state organizations. These policies and algorithms were followed during the patient's care in the ED. Primary Care Physician: No primary care provider on file. History:  This is a 32 y.o. male who presents to the Emergency Department with complaint of chest pain. Started this morning. Some shortness of breath with this. No prior history of pulmonary embolism no recent travel no recent surgeries patient does report taking a unknown white pill last night given to him due to his dental pain, the pill was not from a medical practitioner so he is uncertain as to what was actually in it. He also reports cramping of the thigh and calf of the right side last night, denies swelling. Physical:     height is 5' 10\" (1.778 m) and weight is 230 lb (104.3 kg). His oral temperature is 97.4 °F (36.3 °C). His blood pressure is 149/83 (abnormal) and his pulse is 80. His respiration is 15 and oxygen saturation is 94%.    32 y.o. male no acute distress, cardiac exam regular rate and rhythm no murmurs rubs gallops, pulmonary clear bilaterally abdomen is soft nontender nondistended, calves nontender nonswollen. Impression: Chest pain, shortness of breath. Accidental cocaine usage? DVT/PE?     Plan: Cardiac labs including troponin, chest x-ray, D-dimer, Doppler ultrasound    EKG Interpretation  EKG Interpretation    Interpreted by me    Rhythm: Sinus arrhythmia  Rate: normal  Axis: normal  Ectopy: none  Conduction: normal  ST Segments: no acute change  T Waves: no acute change  Q Waves: none    Clinical Impression: Sinus arrhythmia, otherwise normal EKG  Interpreted by me      Nel Alicea MD, Claudean Rhein  Attending Emergency Physician         Blossom Daniel MD  03/12/22 [id number]

## 2022-03-12 NOTE — ED PROVIDER NOTES
[address]  ED  Emergency Department Encounter  EmergencyMedicine Resident     Pt Name:Suraj Orellana  MRN: [id number]  [place] 1995  Date of evaluation: 3/12/22  PCP:  No primary care provider on file. This patient was evaluated in the Emergency Department for symptoms described in the history of present illness. The patient was evaluated in the context of the global COVID-19 pandemic, which necessitated consideration that the patient might be at risk for infection with the SARS-CoV-2 virus that causes COVID-19. Institutional protocols and algorithms that pertain to the evaluation of patients at risk for COVID-19 are in a state of rapid change based on information released by regulatory bodies including the CDC and federal and state organizations. These policies and algorithms were followed during the patient's care in the ED. CHIEF COMPLAINT       Chief Complaint   Patient presents with    Shortness of Breath     Started this morning    Chest Pain       HISTORY OF PRESENT ILLNESS  (Location/Symptom, Timing/Onset, Context/Setting, Quality, Duration, Modifying Factors, Severity.)      Guillermo Marx is a 32 y.o. male who presents with exertional chest pain that started 1 hour ago while helping a friend move a couch. He is complaining of associated shortness of breath and right upper extremity numbness. Denies any associated coughs, leg swelling, palpitations, diaphoresis or fevers recently. He was seen yesterday for accidental drug overdose. States he has been dealing with a toothache and bought a pill off the street that he thought was Percocet and was found unresponsive and administered Narcan. Other than history of drug use patient also has history of right humerus fracture which caused nerve damage and patient takes gabapentin for secondary neuropathy. He has no recent immobilizations or surgeries, no history of blood clot or personal history of cardiac diseases.     PAST MEDICAL / SURGICAL / SOCIAL / FAMILY HISTORY      has a past medical history of Anxiety, Depression, and History of substance abuse ([facility] [address] has a past surgical history that includes knee surgery; laparoscopic appendectomy (N/A, 01/08/2020); Humerus fracture surgery (Right, 09/22/2021); and Humerus fracture surgery (Right, 9/22/2021). Social History     Socioeconomic History    Marital status: Single     Spouse name: Not on file    Number of children: Not on file    Years of education: Not on file    Highest education level: Not on file   Occupational History    Not on file   Tobacco Use    Smoking status: Current Every Day Smoker    Smokeless tobacco: Never Used   Vaping Use    Vaping Use: Former   Substance and Sexual Activity    Alcohol use: Not Currently     Comment: none since 2017    Drug use: Not Currently     Types: Opiates , IV, Marijuana ([employer]), Cocaine     Comment: last time 3 weeks ago crack and weed    Sexual activity: Not on file   Other Topics Concern    Not on file   Social History Narrative    Not on file     Social Determinants of Health     Financial Resource Strain:     Difficulty of Paying Living Expenses: Not on file   Food Insecurity:     Worried About Running Out of Food in the Last Year: Not on file    [place] of Food in the Last Year: Not on file   Transportation Needs:     Lack of Transportation (Medical): Not on file    Lack of Transportation (Non-Medical):  Not on file   Physical Activity:     Days of Exercise per Week: Not on file    Minutes of Exercise per Session: Not on file   Stress:     Feeling of Stress : Not on file   Social Connections:     Frequency of Communication with Friends and Family: Not on file    Frequency of Social Gatherings with Friends and Family: Not on file    Attends Druze Services: Not on file    Active Member of Clubs or Organizations: Not on file    Attends Club or Organization Meetings: Not on file    Marital Status: Not on file   Intimate Partner Violence:     Fear of Current or Ex-Partner: Not on file    Emotionally Abused: Not on file    Physically Abused: Not on file    Sexually Abused: Not on file   Housing Stability:     Unable to Pay for Housing in the Last Year: Not on file    Number of [place] in the Last Year: Not on file    Unstable Housing in the Last Year: Not on file       Family History   Problem Relation Age of Onset    Hypertension Other        Allergies:  Shellfish-derived products    Home Medications:  Prior to Admission medications    Medication Sig Start Date End Date Taking? Authorizing Provider   gabapentin (NEURONTIN) 400 MG capsule Take 400 mg by mouth 3 times daily. Yes Historical Provider, MD   buprenorphine-naloxone (SUBOXONE) 8-2 MG FILM SL film Place 1 Film under the tongue daily. Historical Provider, MD       REVIEW OF SYSTEMS    (2-9 systems for level 4, 10 or more for level 5)      Review of Systems   Constitutional: Negative for activity change, chills and fever. HENT: Negative for congestion, sinus pressure, sinus pain and sore throat. Eyes: Negative for pain and itching. Respiratory: Positive for shortness of breath. Negative for cough. Cardiovascular: Positive for chest pain. Gastrointestinal: Negative for abdominal distention, abdominal pain, constipation, diarrhea and nausea. Endocrine: Negative for polyuria. Genitourinary: Negative for dysuria and frequency. Musculoskeletal: Negative for arthralgias. Skin: Negative for rash. Neurological: Positive for numbness (RUE). Negative for light-headedness and headaches. PHYSICAL EXAM   (up to 7 for level 4, 8 or more for level 5)      INITIAL VITALS:   BP (!) 149/83   Pulse 80   Temp 97.4 °F (36.3 °C) (Oral)   Resp 15   Ht 5' 10\" (1.778 m)   Wt 230 lb (104.3 kg)   SpO2 94%   BMI 33.00 kg/m²     Physical Exam  Vitals reviewed. Constitutional:       General: He is not in acute distress.   HENT: Head: Normocephalic and atraumatic. Ears:      Comments: Hearing grossly normal     Nose: Nose normal.      Mouth/Throat:      Mouth: Mucous membranes are moist.      Pharynx: Oropharynx is clear. Eyes:      General: No scleral icterus. Conjunctiva/sclera: Conjunctivae normal.      Pupils: Pupils are equal, round, and reactive to light. Cardiovascular:      Rate and Rhythm: Normal rate and regular rhythm. Pulses: Normal pulses. Pulmonary:      Effort: Pulmonary effort is normal. No respiratory distress. Breath sounds: Normal breath sounds. No decreased breath sounds, wheezing or rhonchi. Comments: CTA BL without wheezes or rhonchi, breathing comfortably on room air with no use of accessory muscles of respiration  Abdominal:      General: There is no distension. Tenderness: There is no abdominal tenderness. There is no guarding. Musculoskeletal:      Cervical back: No muscular tenderness. Right lower leg: No edema. Left lower leg: No edema. Skin:     General: Skin is warm and dry. Capillary Refill: Capillary refill takes less than 2 seconds. Neurological:      General: No focal deficit present. Mental Status: He is alert and oriented to person, place, and time. Mental status is at baseline. DIFFERENTIAL  DIAGNOSIS     PLAN (LABS / IMAGING / EKG):  Orders Placed This Encounter   Procedures    XR CHEST PORTABLE    VL DUP LOWER EXTREMITY VENOUS BILATERAL    Basic Metabolic Panel    CBC with Auto Differential    Troponin    D-Dimer, Quantitative    EKG 12 Lead    Insert peripheral IV       MEDICATIONS ORDERED:  No orders of the defined types were placed in this encounter.       DIAGNOSTIC RESULTS / EMERGENCY DEPARTMENT COURSE / MDM   LAB RESULTS:  Results for orders placed or performed during the hospital encounter of 00/33/27   Basic Metabolic Panel   Result Value Ref Range    Glucose 110 (H) 70 - 99 mg/dL    BUN 20 6 - 20 mg/dL    CREATININE 0. 73 0.70 - 1.20 mg/dL    Calcium 9.6 8.6 - 10.4 mg/dL    Sodium 136 135 - 144 mmol/L    Potassium 4.0 3.7 - 5.3 mmol/L    Chloride 100 98 - 107 mmol/L    CO2 23 20 - 31 mmol/L    Anion Gap 13 9 - 17 mmol/L    GFR Non-African American >60 >60 mL/min    GFR African American >60 >60 mL/min    GFR Comment         CBC with Auto Differential   Result Value Ref Range    WBC 5.9 3.5 - 11.3 k/uL    RBC 4.91 4.21 - 5.77 m/uL    Hemoglobin 14.1 13.0 - 17.0 g/dL    Hematocrit 42.6 40.7 - 50.3 %    MCV 86.8 82.6 - 102.9 fL    MCH 28.7 25.2 - 33.5 pg    MCHC 33.1 28.4 - 34.8 g/dL    RDW 14.0 11.8 - 14.4 %    Platelets 271 616 - 723 k/uL    MPV 10.5 8.1 - 13.5 fL    NRBC Automated 0.0 0.0 per 100 WBC    Seg Neutrophils 62 36 - 65 %    Lymphocytes 24 24 - 43 %    Monocytes 10 3 - 12 %    Eosinophils % 3 1 - 4 %    Basophils 1 0 - 2 %    Immature Granulocytes 0 0 %    Segs Absolute 3.67 1.50 - 8.10 k/uL    Absolute Lymph # 1.42 1.10 - 3.70 k/uL    Absolute Mono # 0.57 0.10 - 1.20 k/uL    Absolute Eos # 0.19 0.00 - 0.44 k/uL    Basophils Absolute 0.03 0.00 - 0.20 k/uL    Absolute Immature Granulocyte <0.03 0.00 - 0.30 k/uL   Troponin   Result Value Ref Range    Troponin, High Sensitivity <6 0 - 22 ng/L   D-Dimer, Quantitative   Result Value Ref Range    D-Dimer, Quant 0.46 mg/L FEU       IMPRESSION: Ashtyn Regalado is a 32 y.o. man presenting for exertional chest pain who was recently seen for accidental drug overdose. Patient was requesting urine drug screens yesterday which demonstrated positive amphetamine and cocaine. He reports taking unknown medication yesterday. He is well-appearing with stable vital signs. Concern for ACS secondary to vasospasm from cocaine use. He also does report unusual leg cramping which occurred yesterday. There is no observed leg swelling or edema on his exam but given presenting symptoms will also complete lower extremity Dopplers to rule out DVT.     RADIOLOGY:  XR CHEST PORTABLE    Result Date: 3/12/2022  No acute cardiopulmonary findings. EKG  Normal rate, sinus, normal intervals, no ST segment elevations or depressions, T wave inversions in aVR, normal axis    All EKG's are interpreted by the Emergency Department Physician who either signs or Co-signs this chart in the absence of a cardiologist.    EMERGENCY DEPARTMENT COURSE:  Patient seen and evaluated, VSS and nontoxic in appearance. Patient was observed in the emergency department with no event. Patient went to vascular lab and Doppler results were pending. Patient was updated regarding his cardiac work-up at that time, negative so far. He was requesting urine drug screen. This was completed yesterday. He is reporting that it is required for him to get into [address]. Confirmed with social work that this is not a requirement. Repeat urine drug screen is not relevant to work-up for current symptoms and was not ordered. Patient became angry and frustrated and eloped prior to results of ultrasound becoming available. PROCEDURES:  none    CONSULTS:  None    CRITICAL CARE:  See attending note    FINAL IMPRESSION      1.  Chest pain, unspecified type          DISPOSITION / PLAN     DISPOSITION Eloped - Left Before Treatment Complete 03/12/2022 04:35:53 PM      PATIENT REFERRED TO:  [facility] ED  [address] [address] [phone]    As needed, If symptoms worsen      DISCHARGE MEDICATIONS:  Discharge Medication List as of 3/12/2022  4:36 PM          Lidia Burnette DO  Emergency Medicine Resident    (Please note that portions of thisnote were completed with a voice recognition program.  Efforts were made to edit the dictations but occasionally words are mis-transcribed.)       Lidia Burnette DO  Resident  03/12/22 1581

## 2022-03-12 NOTE — ED NOTES
Pt to ed from home, reports sob that has worsened with movement the past few days and chest pain starting 1 hour pta. Patient reports having tooth pain, took unknown substance yesterday \"bought it on the streets\" reports \" it must have been fentanyl\" because patient was found unconscious and brought to the ed. Patient denies other drug use. Patient is aox4.       Cinthya Lofton RN  03/12/22 0659

## 2022-03-12 NOTE — ED NOTES
Pt left AMA MD at [facility] to discharge pt. Pt did not sign AMA paperwork.      Herlinda Wayne RN  03/11/22 1820

## 2022-03-12 NOTE — ED NOTES
Pt requesting urine be tested for \"drug levels\" needs for shelter since he was an accidental overdose yesterday. Writer notified Dr. Lan Isidro of patient request. Patient notified we are unable to test for trending levels per request. Test only indicates positive or negative. Patient states he is going to leave and not wait for results. Dr. Lan Isidro notified.       Elvis Gonzalez RN  03/12/22 0809

## 2022-03-12 NOTE — ED PROVIDER NOTES
Faculty Sign-Out Attestation  Handoff taken on the following patient from prior Attending Physician: Val Reis    I was available and discussed any additional care issues that arose and coordinated the management plans with the resident(s) caring for the patient during my duty period. Any areas of disagreement with residents documentation of care or procedures are noted on the chart. I was personally present for the key portions of any/all procedures during my duty period. I have documented in the chart those procedures where I was not present during the key portions. 78-year-old male presenting with chest pain and shortness of breath. Last night took a white pill that he does not know what it was for dental pain. Troponin was negative. Dimer negative. Doppler of the legs pending, anticipate discharge if negative.     Shahab Chin MD  Attending Physician       Shahab Chin MD  03/12/22 9031

## 2022-03-12 NOTE — ED NOTES
Spoke with Dr. Tennille Kohli about decreased RR and IV loss. States pt does not need new IV at this time. Med student at bedside at this time.      Adriana Esposito RN  03/11/22 2030

## 2022-03-15 LAB
EKG ATRIAL RATE: 64 BPM
EKG P AXIS: 44 DEGREES
EKG P-R INTERVAL: 136 MS
EKG Q-T INTERVAL: 404 MS
EKG QRS DURATION: 102 MS
EKG QTC CALCULATION (BAZETT): 416 MS
EKG R AXIS: 70 DEGREES
EKG T AXIS: 51 DEGREES
EKG VENTRICULAR RATE: 64 BPM

## 2022-03-25 ENCOUNTER — APPOINTMENT (OUTPATIENT)
Dept: CT IMAGING | Age: 27
End: 2022-03-25
Payer: MEDICARE

## 2022-03-25 ENCOUNTER — APPOINTMENT (OUTPATIENT)
Dept: GENERAL RADIOLOGY | Age: 27
End: 2022-03-25
Payer: MEDICARE

## 2022-03-25 ENCOUNTER — HOSPITAL ENCOUNTER (EMERGENCY)
Age: 27
Discharge: HOME | End: 2022-03-25
Attending: EMERGENCY MEDICINE
Payer: MEDICARE

## 2022-03-25 VITALS
OXYGEN SATURATION: 91 % | SYSTOLIC BLOOD PRESSURE: 130 MMHG | DIASTOLIC BLOOD PRESSURE: 57 MMHG | RESPIRATION RATE: 15 BRPM | HEART RATE: 80 BPM | TEMPERATURE: 98.2 F | BODY MASS INDEX: 34.07 KG/M2 | WEIGHT: 230 LBS | HEIGHT: 69 IN

## 2022-03-25 DIAGNOSIS — S52.502A CLOSED FRACTURE OF DISTAL ENDS OF LEFT RADIUS AND ULNA, INITIAL ENCOUNTER: ICD-10-CM

## 2022-03-25 DIAGNOSIS — S52.602A CLOSED FRACTURE OF DISTAL ENDS OF LEFT RADIUS AND ULNA, INITIAL ENCOUNTER: ICD-10-CM
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
## 2022-03-25 PROBLEM — F14.90 COCAINE USE: Status: ACTIVE | Noted: 2022-03-25

## 2022-03-25 PROBLEM — S52.92XA FRACTURE OF LEFT RADIUS: Status: ACTIVE | Noted: 2022-03-25

## 2022-03-25 PROBLEM — S52.202A FRACTURE OF LEFT ULNA: Status: ACTIVE | Noted: 2022-03-25

## 2022-03-25 PROBLEM — F12.90 MARIJUANA USE: Status: ACTIVE | Noted: 2022-03-25

## 2022-03-25 LAB
ABSOLUTE EOS #: 0.35 K/UL (ref 0–0.44)
ABSOLUTE IMMATURE GRANULOCYTE: 0 K/UL (ref 0–0.3)
ABSOLUTE LYMPH #: 2.96 K/UL (ref 1.1–3.7)
ABSOLUTE MONO #: 0.69 K/UL (ref 0.1–1.2)
ALBUMIN SERPL-MCNC: 4 G/DL (ref 3.5–5.2)
ALBUMIN/GLOBULIN RATIO: 1.8 (ref 1–2.5)
ALP BLD-CCNC: 76 U/L (ref 40–129)
ALT SERPL-CCNC: 46 U/L (ref 5–41)
AMPHETAMINE SCREEN URINE: NEGATIVE
ANION GAP SERPL CALCULATED.3IONS-SCNC: 13 MMOL/L (ref 9–17)
AST SERPL-CCNC: 50 U/L
BARBITURATE SCREEN URINE: NEGATIVE
BASOPHILS # BLD: 1 % (ref 0–2)
BASOPHILS ABSOLUTE: 0.07 K/UL (ref 0–0.2)
BENZODIAZEPINE SCREEN, URINE: NEGATIVE
BILIRUB SERPL-MCNC: 0.27 MG/DL (ref 0.3–1.2)
BUN BLDV-MCNC: 16 MG/DL (ref 6–20)
CALCIUM SERPL-MCNC: 8.9 MG/DL (ref 8.6–10.4)
CANNABINOID SCREEN URINE: POSITIVE
CHLORIDE BLD-SCNC: 98 MMOL/L (ref 98–107)
CO2: 23 MMOL/L (ref 20–31)
COCAINE METABOLITE, URINE: POSITIVE
CREAT SERPL-MCNC: 0.72 MG/DL (ref 0.7–1.2)
EOSINOPHILS RELATIVE PERCENT: 5 % (ref 1–4)
GFR AFRICAN AMERICAN: >60 ML/MIN
GFR NON-AFRICAN AMERICAN: >60 ML/MIN
GFR SERPL CREATININE-BSD FRML MDRD: ABNORMAL ML/MIN/{1.73_M2}
GLUCOSE BLD-MCNC: 113 MG/DL (ref 70–99)
HCT VFR BLD CALC: 38.6 % (ref 40.7–50.3)
HEMOGLOBIN: 13.1 G/DL (ref 13–17)
IMMATURE GRANULOCYTES: 0 %
INR BLD: 1.1
LIPASE: 14 U/L (ref 13–60)
LYMPHOCYTES # BLD: 43 % (ref 24–43)
MCH RBC QN AUTO: 29.1 PG (ref 25.2–33.5)
MCHC RBC AUTO-ENTMCNC: 33.9 G/DL (ref 28.4–34.8)
MCV RBC AUTO: 85.8 FL (ref 82.6–102.9)
METHADONE SCREEN, URINE: NEGATIVE
MONOCYTES # BLD: 10 % (ref 3–12)
MORPHOLOGY: NORMAL
NRBC AUTOMATED: 0 PER 100 WBC
OPIATES, URINE: POSITIVE
OXYCODONE SCREEN URINE: NEGATIVE
PDW BLD-RTO: 13.8 % (ref 11.8–14.4)
PHENCYCLIDINE, URINE: NEGATIVE
PLATELET # BLD: 154 K/UL (ref 138–453)
PMV BLD AUTO: 11.1 FL (ref 8.1–13.5)
POTASSIUM SERPL-SCNC: 3.7 MMOL/L (ref 3.7–5.3)
PROTHROMBIN TIME: 11.6 SEC (ref 9.1–12.3)
RBC # BLD: 4.5 M/UL (ref 4.21–5.77)
REASON FOR REJECTION: NORMAL
SARS-COV-2, RAPID: NOT DETECTED
SEG NEUTROPHILS: 41 % (ref 36–65)
SEGMENTED NEUTROPHILS ABSOLUTE COUNT: 2.83 K/UL (ref 1.5–8.1)
SODIUM BLD-SCNC: 134 MMOL/L (ref 135–144)
SPECIMEN DESCRIPTION: NORMAL
TEST INFORMATION: ABNORMAL
TOTAL PROTEIN: 6.2 G/DL (ref 6.4–8.3)
WBC # BLD: 6.9 K/UL (ref 3.5–11.3)
ZZ NTE CLEAN UP: ORDERED TEST: NORMAL
ZZ NTE WITH NAME CLEAN UP: SPECIMEN SOURCE: NORMAL

## 2022-03-25 PROCEDURE — 3209999900 CT LUMBAR SPINE TRAUMA RECONSTRUCTION

## 2022-03-25 PROCEDURE — 29125 APPL SHORT ARM SPLINT STATIC: CPT

## 2022-03-25 PROCEDURE — 83690 ASSAY OF LIPASE: CPT

## 2022-03-25 PROCEDURE — 73080 X-RAY EXAM OF ELBOW: CPT

## 2022-03-25 PROCEDURE — 80053 COMPREHEN METABOLIC PANEL: CPT

## 2022-03-25 PROCEDURE — 99285 EMERGENCY DEPT VISIT HI MDM: CPT

## 2022-03-25 PROCEDURE — 6360000002 HC RX W HCPCS: Performed by: ORTHOPAEDIC SURGERY

## 2022-03-25 PROCEDURE — 73090 X-RAY EXAM OF FOREARM: CPT

## 2022-03-25 PROCEDURE — 73110 X-RAY EXAM OF WRIST: CPT

## 2022-03-25 PROCEDURE — 2500000003 HC RX 250 WO HCPCS: Performed by: ORTHOPAEDIC SURGERY

## 2022-03-25 PROCEDURE — 6360000002 HC RX W HCPCS: Performed by: STUDENT IN AN ORGANIZED HEALTH CARE EDUCATION/TRAINING PROGRAM

## 2022-03-25 PROCEDURE — 73130 X-RAY EXAM OF HAND: CPT

## 2022-03-25 PROCEDURE — 70450 CT HEAD/BRAIN W/O DYE: CPT

## 2022-03-25 PROCEDURE — 96375 TX/PRO/DX INJ NEW DRUG ADDON: CPT

## 2022-03-25 PROCEDURE — 96374 THER/PROPH/DIAG INJ IV PUSH: CPT

## 2022-03-25 PROCEDURE — 96376 TX/PRO/DX INJ SAME DRUG ADON: CPT

## 2022-03-25 PROCEDURE — 90715 TDAP VACCINE 7 YRS/> IM: CPT

## 2022-03-25 PROCEDURE — 73030 X-RAY EXAM OF SHOULDER: CPT

## 2022-03-25 PROCEDURE — 73100 X-RAY EXAM OF WRIST: CPT

## 2022-03-25 PROCEDURE — 87635 SARS-COV-2 COVID-19 AMP PRB: CPT

## 2022-03-25 PROCEDURE — 6360000004 HC RX CONTRAST MEDICATION: Performed by: STUDENT IN AN ORGANIZED HEALTH CARE EDUCATION/TRAINING PROGRAM

## 2022-03-25 PROCEDURE — 72125 CT NECK SPINE W/O DYE: CPT

## 2022-03-25 PROCEDURE — 80307 DRUG TEST PRSMV CHEM ANLYZR: CPT

## 2022-03-25 PROCEDURE — 73200 CT UPPER EXTREMITY W/O DYE: CPT

## 2022-03-25 PROCEDURE — 85610 PROTHROMBIN TIME: CPT

## 2022-03-25 PROCEDURE — 3209999900 CT THORACIC SPINE TRAUMA RECONSTRUCTION

## 2022-03-25 PROCEDURE — 85025 COMPLETE CBC W/AUTO DIFF WBC: CPT

## 2022-03-25 PROCEDURE — 90471 IMMUNIZATION ADMIN: CPT

## 2022-03-25 PROCEDURE — 73060 X-RAY EXAM OF HUMERUS: CPT

## 2022-03-25 PROCEDURE — 6360000002 HC RX W HCPCS

## 2022-03-25 PROCEDURE — 71260 CT THORAX DX C+: CPT

## 2022-03-25 RX ORDER — MORPHINE SULFATE 4 MG/ML
4 INJECTION, SOLUTION INTRAMUSCULAR; INTRAVENOUS ONCE
Status: COMPLETED | OUTPATIENT
Start: 2022-03-25 | End: 2022-03-25

## 2022-03-25 RX ORDER — FENTANYL CITRATE 50 UG/ML
50 INJECTION, SOLUTION INTRAMUSCULAR; INTRAVENOUS ONCE
Status: COMPLETED | OUTPATIENT
Start: 2022-03-25 | End: 2022-03-25

## 2022-03-25 RX ORDER — LIDOCAINE HYDROCHLORIDE 10 MG/ML
20 INJECTION, SOLUTION INFILTRATION; PERINEURAL ONCE
Status: COMPLETED | OUTPATIENT
Start: 2022-03-25 | End: 2022-03-25

## 2022-03-25 RX ADMIN — IOPAMIDOL 130 ML: 755 INJECTION, SOLUTION INTRAVENOUS at 04:04

## 2022-03-25 RX ADMIN — TETANUS TOXOID, REDUCED DIPHTHERIA TOXOID AND ACELLULAR PERTUSSIS VACCINE, ADSORBED 0.5 ML: 5; 2.5; 8; 8; 2.5 SUSPENSION INTRAMUSCULAR at 04:33

## 2022-03-25 RX ADMIN — MORPHINE SULFATE 4 MG: 4 INJECTION INTRAVENOUS at 04:47

## 2022-03-25 RX ADMIN — MORPHINE SULFATE 4 MG: 4 INJECTION, SOLUTION INTRAMUSCULAR; INTRAVENOUS at 04:07

## 2022-03-25 RX ADMIN — FENTANYL CITRATE 50 MCG: 50 INJECTION, SOLUTION INTRAMUSCULAR; INTRAVENOUS at 05:38

## 2022-03-25 RX ADMIN — LIDOCAINE HYDROCHLORIDE 20 ML: 10 INJECTION, SOLUTION INFILTRATION; PERINEURAL at 05:30

## 2022-03-25 ASSESSMENT — PAIN - FUNCTIONAL ASSESSMENT: PAIN_FUNCTIONAL_ASSESSMENT: 0-10

## 2022-03-25 ASSESSMENT — PAIN SCALES - GENERAL
PAINLEVEL_OUTOF10: 10
PAINLEVEL_OUTOF10: 8

## 2022-03-25 ASSESSMENT — ENCOUNTER SYMPTOMS
WHEEZING: 0
SHORTNESS OF BREATH: 0
PHOTOPHOBIA: 0
TROUBLE SWALLOWING: 0
COLOR CHANGE: 0
ABDOMINAL PAIN: 0
BACK PAIN: 0
NAUSEA: 0
VOMITING: 0

## 2022-03-25 ASSESSMENT — PAIN DESCRIPTION - FREQUENCY: FREQUENCY: INTERMITTENT

## 2022-03-25 ASSESSMENT — PAIN DESCRIPTION - DESCRIPTORS: DESCRIPTORS: ACHING;SHARP;SHOOTING

## 2022-03-25 ASSESSMENT — PAIN DESCRIPTION - ORIENTATION: ORIENTATION: LEFT;RIGHT

## 2022-03-25 ASSESSMENT — PAIN DESCRIPTION - LOCATION: LOCATION: ARM;RIB CAGE

## 2022-03-25 NOTE — ED NOTES
Pt back from [address] [facility], [facility]  03/25/22 5913

## 2022-03-25 NOTE — H&P
TRAUMA HISTORY AND PHYSICAL EXAMINATION    PATIENT NAME: Salazar Corral  YOB: 1995  MEDICAL RECORD NO. [id number]   DATE: 3/25/2022  PRIMARY CARE PHYSICIAN: No primary care provider on file. PATIENT EVALUATED AT THE REQUEST OF : Kaleb Prado    ACTIVATION   []Trauma Alert     [] Trauma Priority     [x]Trauma Consult. IMPRESSION:     Patient Active Problem List   Diagnosis   (none) - all problems resolved or deleted     1.)  Left comminuted distal radius fracture  2.) T5 superior endplate deformity    MEDICAL DECISION MAKING AND PLAN:        1.) Colles' Fracture of Left wrist   -f/u final reads on imaging   -NPO   -Pain control   -hold DVT ppx   -admit to stepdown   -f/u ortho recs   -admit to stepdown   -TERT      2.) Possible T5 endplate deformity   -uncertain acuity on scan   - can consider neurosurgery consultation if s/sx of neuro deficit on TERT   -Neurochecks per protocol        CONSULT SERVICES    [] Neurosurgery     [x] Orthopedic Surgery    [] Cardiothoracic     [] Facial Trauma    [] Plastic Surgery (Burn)    [] Pediatric Surgery     [] Internal Medicine    [] Pulmonary Medicine    [] Other:      HISTORY:     Chief Complaint:  \"Fall From garbage truck\"    HPI    Patient is a 78-year-old male without remarkable past medical history, active IV drug user and presented status post fall from a height of approximately 10 to 12 feet after being dropped from a garbage truck. Patient was dumpster diving for a cell phone that he allegedly lost in sudden dumpster, patient was unaware that the garbage truck was coming around, the garbage truck picked up the dumpster and he attempted to extricate himself before being dumped into the garbage truck. He fell out and broke his fall with his left hand. Patient did  lose consciousness. He has not had any chest pain shortness of breath dizziness drowsiness or other constitutional symptoms.       GENERAL DATA  Age 32 y.o.  male   Patient information was obtained from patient and past medical records. History/Exam limitations: none.   Patient presented to the Emergency Department on:  Injury Date: 3/25/2022        Transport mode:   []Ambulance      [] Helicopter     []Car       [] Other      INJURY LOCATION, In a commercial dumpster     MECHANISM OF INJURY    [] Motor Vehicle Collision   Specific vehicle type involved (e.g., sedan, minivan, SUV, pickup truck):   Collision with (e.g., type of vehicle, building, barn, ditch, tree):     Type of collision  [] Single Vehicle Collision  []Multiple Vehicle Collision  [] unknown collision type    Mechanism considerations  [] Fatality in Same Vehicle      []Ejected       []Rollover          []Extricated    Internal Compartment   []                      []Passenger:      []Front Seat        []Rear Seat     Personal Restraints  [] Unrestrained   []Lap Belt Only Restrained   [] Shoulder Belt Only Restrained  [] 3 Point Restrained  [] unknown     Air Bags  [] Front Air Bag  []Side Air Bag  []Curtain Airbag []Air Bag Not Deployed    []No Air Bag equipped in vehicle      Pediatric Consideration:      [] Booster Seat  []Infant Car Seat  [] Child Car Seat      [] Motorcycle Collision   Wearing Helmet     []Yes     []No    []Unknown    [] ATV crash  Wearing Helmet     []Yes     []No    []Unknown    [] Bicycle Collision Wearing Helmet     []Yes     []No    []Unknown    [] Pedestrian Struck         [] Fall    []From Standing     []From Height  Ft     []Down Stairs ___steps    [] Assault    [] Gunshot  Specify caliber / type of gun: ____________________________    [] Stabbing  Specify weapon type, size: _____________________________    [] Burn  []Flame   []Scald   []Electrical   []Chemical  []Inhalation   []House fire    [x] Other   Fall from garbage truck    [] Other protective devices used / worn ___________________________      Loss of Consciousness [x]No   []Yes Duration(min)       [] Unknown     Total Fluids Given Prior To Arrival  mL    MEDICATIONS:   []  None     []  Information not available due to exam limitations documented above    Prior to Admission medications    Medication Sig Start Date End Date Taking? Authorizing Provider   gabapentin (NEURONTIN) 400 MG capsule Take 400 mg by mouth 3 times daily. Historical Provider, MD   buprenorphine-naloxone (SUBOXONE) 8-2 MG FILM SL film Place 1 Film under the tongue daily. Historical Provider, MD       ALLERGIES:   []  None    []   Information not available due to exam limitations documented above     Shellfish-derived products    PAST MEDICAL HISTORY: []  None   []   Information not available due to exam limitations documented above      has a past medical history of Anxiety, Depression, and History of substance abuse ([facility] [address] has a past surgical history that includes knee surgery; laparoscopic appendectomy (N/A, 01/08/2020); Humerus fracture surgery (Right, 09/22/2021); and Humerus fracture surgery (Right, 9/22/2021). FAMILY HISTORY   []   Information not available due to exam limitations documented above    family history includes Hypertension in an other family member. SOCIAL HISTORY  []   Information not available due to exam limitations documented above     reports that he has been smoking. He has never used smokeless tobacco.   reports previous alcohol use. reports previous drug use. Drugs: Opiates , IV, Marijuana ([facility] [place]), and Cocaine. Review of Systems:    []   Information not available due to exam limitations documented above    Review of Systems   Constitutional: Negative for fever. HENT: Negative for congestion and trouble swallowing. Respiratory: Negative for shortness of breath and wheezing. Gastrointestinal: Negative for abdominal pain. Genitourinary: Negative for dysuria. Musculoskeletal: Negative for back pain and myalgias. Skin: Negative for color change.    Neurological: Negative for dizziness, syncope, weakness, light-headedness and headaches. PHYSICAL EXAMINATION:     GLASCOW COMA SCALE  NEUROMUSCULAR BLOCKADE PRIOR TO ARRIVAL     [x]No        []Yes      Variable  Score   Variable  Score  Eye opening [x]Spontaneous 4 Verbal  [x]Oriented  5     []To voice  3   []Confused  4    []To pain  2   []Inapp words  3    []None  1   []Incomp words 2       []None  1   Motor   [x]Obeys  6    []Localizes pain 5    []Withdraws(pain) 4    []Flexion(pain) 3  []Extension(pain) 2    []None  1     GCS Total = 15    PHYSICAL EXAMINATION    VITAL SIGNS:   Vitals:    03/25/22 0501   BP: 133/75   Pulse: 76   Resp: 16   Temp:    SpO2: 92%       Physical Exam  Constitutional:       Appearance: He is well-developed. HENT:      Head: Normocephalic and atraumatic. Nose: Nose normal.      Mouth/Throat:      Mouth: Mucous membranes are moist.   Eyes:      Conjunctiva/sclera: Conjunctivae normal.   Cardiovascular:      Rate and Rhythm: Normal rate and regular rhythm. Heart sounds: Normal heart sounds. No murmur heard. No friction rub. Pulmonary:      Effort: Pulmonary effort is normal. No respiratory distress. Breath sounds: Normal breath sounds. No rales. Chest:      Chest wall: No tenderness. Abdominal:      General: Abdomen is flat. Palpations: Abdomen is soft. There is no hepatomegaly, splenomegaly or pulsatile mass. Tenderness: There is no abdominal tenderness. Hernia: No hernia is present. Musculoskeletal:      Right wrist: Normal pulse. Left wrist: Swelling, deformity, tenderness and bony tenderness present. No effusion or lacerations. Decreased range of motion. Normal pulse. Cervical back: Normal. No swelling or deformity. Thoracic back: Normal. No swelling or deformity. Lumbar back: Normal. No swelling or deformity. Skin:     General: Skin is warm. Capillary Refill: Capillary refill takes less than 2 seconds.       Comments: Sclerosed veins consistent w/ hx of IVDA on upper extremities Neurological:      General: No focal deficit present. Mental Status: He is alert. Motor: No weakness. FOCUSED ABDOMINAL SONOGRAM FOR TRAUMA (FAST): A good  quality examination was performed by Dr. Riaz Wagner and representative images were obtained. [x] No free fluid in the abdomen   [] Free fluid in RUQ   [] Free fluid in LUQ  [] Free fluid in Pelvis  [] Pericardial fluid  [] Other:        RADIOLOGY  CT HEAD WO CONTRAST   Final Result   No acute CT abnormality identified in the brain. No acute osseous abnormality identified in the cervical spine. CT CERVICAL SPINE WO CONTRAST   Final Result   No acute CT abnormality identified in the brain. No acute osseous abnormality identified in the cervical spine. CT CHEST ABDOMEN PELVIS W CONTRAST   Final Result   1. No acute traumatic injury identified in the chest, abdomen or pelvis. 2.  Mild superior endplate deformity of T5 is of uncertain acuity and may be   related to disc disease versus acute trauma. 3.  Unremarkable appearance of the lumbar spine. CT THORACIC SPINE TRAUMA RECONSTRUCTION   Final Result   1. No acute traumatic injury identified in the chest, abdomen or pelvis. 2.  Mild superior endplate deformity of T5 is of uncertain acuity and may be   related to disc disease versus acute trauma. 3.  Unremarkable appearance of the lumbar spine. CT LUMBAR SPINE TRAUMA RECONSTRUCTION   Final Result   1. No acute traumatic injury identified in the chest, abdomen or pelvis. 2.  Mild superior endplate deformity of T5 is of uncertain acuity and may be   related to disc disease versus acute trauma. 3.  Unremarkable appearance of the lumbar spine.          XR SHOULDER LEFT (MIN 2 VIEWS)    (Results Pending)   XR ELBOW LEFT (MIN 3 VIEWS)    (Results Pending)   XR WRIST LEFT (MIN 3 VIEWS)    (Results Pending)   XR HAND LEFT (MIN 3 VIEWS)    (Results Pending)   XR HUMERUS LEFT (MIN 2 VIEWS)    (Results Pending)   XR RADIUS ULNA LEFT (2 VIEWS)    (Results Pending)   XR WRIST LEFT (MIN 3 VIEWS)    (Results Pending)   XR WRIST LEFT (MIN 3 VIEWS)    (Results Pending)         LABS    Labs Reviewed   CBC WITH AUTO DIFFERENTIAL - Abnormal; Notable for the following components:       Result Value    Hematocrit 38.6 (*)     Eosinophils % 5 (*)     All other components within normal limits   SPECIMEN REJECTION   COMPREHENSIVE METABOLIC PANEL   LIPASE   PREVIOUS SPECIMEN         Rasheeda Valladares DO  3/25/22, 5:28 AM      Attending Note      I have reviewed the above GCS note(s) and I either performed the key elements of the medical history and physical exam or was present with the trauma resident when the key elements of the medical history and physical exam were performed. I have discussed the findings, established the care plan and recommendations with the trauma team.  Homeless, fell out of dumpster being emptied. Ortho involved with wrist fx, NS to eval T5 but no tenderness. Await ortho plan.     Obi Thompson MD  3/25/2022  7:01 AM

## 2022-03-25 NOTE — Clinical Note
Ange Andino was seen and treated in our emergency department on 3/25/2022. He may return to work on 03/28/2022. If you have any questions or concerns, please don't hesitate to call.       Liliana Celaya, DO

## 2022-03-25 NOTE — ED PROVIDER NOTES
[facility] ED  Emergency Department Encounter  EmergencyMedicine Resident     Pt Name:Suraj Downs  MRN: [id number]  [place] 1995  Date of evaluation: 3/25/22  PCP:  No primary care provider on file. This patient was evaluated in the Emergency Department for symptoms described in the history of present illness. The patient was evaluated in the context of the global COVID-19 pandemic, which necessitated consideration that the patient might be at risk for infection with the SARS-CoV-2 virus that causes COVID-19. Institutional protocols and algorithms that pertain to the evaluation of patients at risk for COVID-19 are in a state of rapid change based on information released by regulatory bodies including the CDC and federal and state organizations. These policies and algorithms were followed during the patient's care in the ED. CHIEF COMPLAINT       Chief Complaint   Patient presents with    Fall   fall from [address] [place] [address]  (Location/Symptom, Timing/Onset, Context/Setting, Quality, Duration, Modifying Factors, Severity.)      Elias Wilde is a 32 y.o. male who presents with complaint of a fall. Patient states that his cell phone dropped in a dumpster he got in the dumpster to retrieve it at which time a garbage truck came to retrieve the garbage patient was approximately 10 to 12 feet in the air when he fell out of the dumpster MS was called by the  of the garbage truck. Patient states that he lost consciousness he is complaining of significant left upper extremity pain most significant in the area of his left wrist he has rate facial swelling as well. Patient arrives by EMS denies any blood thinning medications.       Onset: 1 hour pta  Provocation: fall  Quality: sharp and throbbing  Radiation: along left arm  Severity: 10/10  Timing: constant  Interventions: none    PAST MEDICAL / SURGICAL / SOCIAL / FAMILY HISTORY     Past medical history, surgical history, social history, family history as well as patient's allergies and home medications were reviewed. has a past medical history of Anxiety, Depression, and History of substance abuse ([facility] [address] has a past surgical history that includes knee surgery; laparoscopic appendectomy (N/A, 01/08/2020); Humerus fracture surgery (Right, 09/22/2021); and Humerus fracture surgery (Right, 9/22/2021). Social History     Socioeconomic History    Marital status: Single     Spouse name: Not on file    Number of children: Not on file    Years of education: Not on file    Highest education level: Not on file   Occupational History    Not on file   Tobacco Use    Smoking status: Current Every Day Smoker    Smokeless tobacco: Never Used   Vaping Use    Vaping Use: Former   Substance and Sexual Activity    Alcohol use: Not Currently     Comment: none since 2017    Drug use: Not Currently     Types: Opiates , IV, Marijuana (Beatris [employer]), Cocaine     Comment: last time 3 weeks ago crack and weed    Sexual activity: Not on file   Other Topics Concern    Not on file   Social History Narrative    Not on file     Social Determinants of Health     Financial Resource Strain:     Difficulty of Paying Living Expenses: Not on file   Food Insecurity:     Worried About Running Out of Food in the Last Year: Not on file    [place] of Food in the Last Year: Not on file   Transportation Needs:     Lack of Transportation (Medical): Not on file    Lack of Transportation (Non-Medical):  Not on file   Physical Activity:     Days of Exercise per Week: Not on file    Minutes of Exercise per Session: Not on file   Stress:     Feeling of Stress : Not on file   Social Connections:     Frequency of Communication with Friends and Family: Not on file    Frequency of Social Gatherings with Friends and Family: Not on file    Attends [employer] Services: Not on file    Active Member of Clubs or Organizations: Not on file   [facility] Attends Club or Organization Meetings: Not on file    Marital Status: Not on file   Intimate Partner Violence:     Fear of Current or Ex-Partner: Not on file    Emotionally Abused: Not on file    Physically Abused: Not on file    Sexually Abused: Not on file   Housing Stability:     Unable to Pay for Housing in the Last Year: Not on file    Number of [place] in the Last Year: Not on file    Unstable Housing in the Last Year: Not on file       Family History   Problem Relation Age of Onset    Hypertension Other        Allergies:  Shellfish-derived products    Home Medications:  Prior to Admission medications    Medication Sig Start Date End Date Taking? Authorizing Provider   gabapentin (NEURONTIN) 400 MG capsule Take 400 mg by mouth 3 times daily. Historical Provider, MD   buprenorphine-naloxone (SUBOXONE) 8-2 MG FILM SL film Place 1 Film under the tongue daily. Historical Provider, MD       REVIEW OF SYSTEMS    (2-9 systems for level 4, 10 or more for level 5)      Review of Systems   Constitutional: Negative for fever. HENT: Negative for congestion and dental problem. Eyes: Negative for photophobia and visual disturbance. Respiratory: Negative for shortness of breath. Cardiovascular: Negative for chest pain. Gastrointestinal: Negative for abdominal pain, nausea and vomiting. Genitourinary: Negative for flank pain. Skin: Positive for wound. Allergic/Immunologic: Positive for food allergies. Negative for environmental allergies. Neurological: Negative for weakness. Hematological: Negative for adenopathy. Psychiatric/Behavioral: Negative for agitation and confusion. PHYSICAL EXAM   (up to 7 for level 4, 8 or more for level 5)      INITIAL VITALS:   BP (!) 130/57   Pulse 80   Temp 98.2 °F (36.8 °C)   Resp 15   Ht 5' 9\" (1.753 m)   Wt 230 lb (104.3 kg)   SpO2 91%   BMI 33.97 kg/m²     Physical Exam  Vitals and nursing note reviewed.    Constitutional: Appearance: Normal appearance. He is normal weight. He is not ill-appearing, toxic-appearing or diaphoretic. Comments: Acutely injured nontoxic   HENT:      Head:      Comments: Swelling in the right zygoma     Right Ear: Tympanic membrane, ear canal and external ear normal. There is no impacted cerumen. Left Ear: Tympanic membrane, ear canal and external ear normal. There is no impacted cerumen. Ears:      Comments: Hemotympanum, no CSF otorrhea no CSF rhinorrhea no raccoon eyes, no padilla sign. Nose: Nose normal.      Mouth/Throat:      Pharynx: Oropharynx is clear. Eyes:      General: No scleral icterus. Extraocular Movements: Extraocular movements intact. Conjunctiva/sclera: Conjunctivae normal.      Pupils: Pupils are equal, round, and reactive to light. Cardiovascular:      Rate and Rhythm: Normal rate. Pulses: Normal pulses. Pulmonary:      Effort: Pulmonary effort is normal.      Breath sounds: Normal breath sounds. Abdominal:      Palpations: Abdomen is soft. Tenderness: There is no abdominal tenderness. Musculoskeletal:         General: Tenderness present. Normal range of motion. Cervical back: No tenderness. Comments: Left upper extremity particular left wrist   Skin:     General: Skin is warm. Capillary Refill: Capillary refill takes less than 2 seconds. Neurological:      Mental Status: He is alert.    Psychiatric:         Mood and Affect: Mood normal.          DIFFERENTIAL  DIAGNOSIS     PLAN (LABS / IMAGING / EKG):  Orders Placed This Encounter   Procedures    COVID-19, Rapid    CT HEAD WO CONTRAST    CT CERVICAL SPINE WO CONTRAST    XR SHOULDER LEFT (MIN 2 VIEWS)    XR ELBOW LEFT (MIN 3 VIEWS)    XR WRIST LEFT (MIN 3 VIEWS)    XR HAND LEFT (MIN 3 VIEWS)    XR HUMERUS LEFT (MIN 2 VIEWS)    XR RADIUS ULNA LEFT (2 VIEWS)    CT CHEST ABDOMEN PELVIS W CONTRAST    CT THORACIC SPINE TRAUMA RECONSTRUCTION    CT LUMBAR SPINE TRAUMA RECONSTRUCTION    XR WRIST LEFT (MIN 3 VIEWS)    CT WRIST LEFT WO CONTRAST    XR WRIST LEFT (2 VIEWS)    CBC with Auto Differential    SPECIMEN REJECTION    Comprehensive Metabolic Panel    Lipase    PREVIOUS SPECIMEN    DRUG SCREEN MULTI URINE    Protime-INR    PREVIOUS SPECIMEN    Inpatient consult to Neurosurgery    Inpatient consult to Trauma Surgery    Inpatient consult to Orthopedic Surgery       MEDICATIONS ORDERED:  Orders Placed This Encounter   Medications    morphine injection 4 mg    Tetanus-Diphth-Acell Pertussis (BOOSTRIX) injection 0.5 mL    DISCONTD: iopamidol (ISOVUE-370) 76 % injection 75 mL    iopamidol (ISOVUE-370) 76 % injection 130 mL    Tetanus-Diphth-Acell Pertussis (BOOSTRIX) 5-2.5-18.5 LF-MCG/0.5 injection     Nelta [place]: cabinet override    morphine injection 4 mg    lidocaine 1 % injection 20 mL    fentaNYL (SUBLIMAZE) injection 50 mcg       DDX: fracture, intracranial process, contusion, multisystem trauma    DIAGNOSTIC RESULTS / EMERGENCY DEPARTMENT COURSE / MDM   LAB RESULTS:  Results for orders placed or performed during the hospital encounter of 03/25/22   COVID-19, Rapid    Specimen: Nasopharyngeal Swab   Result Value Ref Range    Specimen Description . NASOPHARYNGEAL SWAB     SARS-CoV-2, Rapid Not Detected Not Detected   CBC with Auto Differential   Result Value Ref Range    WBC 6.9 3.5 - 11.3 k/uL    RBC 4.50 4.21 - 5.77 m/uL    Hemoglobin 13.1 13.0 - 17.0 g/dL    Hematocrit 38.6 (L) 40.7 - 50.3 %    MCV 85.8 82.6 - 102.9 fL    MCH 29.1 25.2 - 33.5 pg    MCHC 33.9 28.4 - 34.8 g/dL    RDW 13.8 11.8 - 14.4 %    Platelets 242 139 - 799 k/uL    MPV 11.1 8.1 - 13.5 fL    NRBC Automated 0.0 0.0 per 100 WBC    Immature Granulocytes 0 0 %    Seg Neutrophils 41 36 - 65 %    Lymphocytes 43 24 - 43 %    Monocytes 10 3 - 12 %    Eosinophils % 5 (H) 1 - 4 %    Basophils 1 0 - 2 %    Absolute Immature Granulocyte 0.00 0.00 - 0.30 k/uL    Segs Absolute 2.83 1.50 - 8.10 k/uL    Absolute Lymph # 2.96 1.10 - 3.70 k/uL    Absolute Mono # 0.69 0.10 - 1.20 k/uL    Absolute Eos # 0.35 0.00 - 0.44 k/uL    Basophils Absolute 0.07 0.00 - 0.20 k/uL    Morphology Normal    SPECIMEN REJECTION   Result Value Ref Range    Specimen Source . BLOOD     Ordered Test CP,LIP     Reason for Rejection Unable to perform testing: Specimen hemolyzed. Comprehensive Metabolic Panel   Result Value Ref Range    Glucose 113 (H) 70 - 99 mg/dL    BUN 16 6 - 20 mg/dL    CREATININE 0.72 0.70 - 1.20 mg/dL    Calcium 8.9 8.6 - 10.4 mg/dL    Sodium 134 (L) 135 - 144 mmol/L    Potassium 3.7 3.7 - 5.3 mmol/L    Chloride 98 98 - 107 mmol/L    CO2 23 20 - 31 mmol/L    Anion Gap 13 9 - 17 mmol/L    Alkaline Phosphatase 76 40 - 129 U/L    ALT 46 (H) 5 - 41 U/L    AST 50 (H) <40 U/L    Total Bilirubin 0.27 (L) 0.3 - 1.2 mg/dL    Total Protein 6.2 (L) 6.4 - 8.3 g/dL    Albumin 4.0 3.5 - 5.2 g/dL    Albumin/Globulin Ratio 1.8 1.0 - 2.5    GFR Non-African American >60 >60 mL/min    GFR African American >60 >60 mL/min    GFR Comment         Lipase   Result Value Ref Range    Lipase 14 13 - 60 U/L   DRUG SCREEN MULTI URINE   Result Value Ref Range    Amphetamine Screen, Ur NEGATIVE NEGATIVE    Barbiturate Screen, Ur NEGATIVE NEGATIVE    Benzodiazepine Screen, Urine NEGATIVE NEGATIVE    Cocaine Metabolite, Urine POSITIVE (A) NEGATIVE    Methadone Screen, Urine NEGATIVE NEGATIVE    Opiates, Urine POSITIVE (A) NEGATIVE    Phencyclidine, Urine NEGATIVE NEGATIVE    Cannabinoid Scrn, Ur POSITIVE (A) NEGATIVE    Oxycodone Screen, Ur NEGATIVE NEGATIVE    Test Information       Assay provides medical screening only. The absence of expected drug(s) and/or metabolite(s) may indicate diluted or adulterated urine, limitations of testing or timing of collection.    Protime-INR   Result Value Ref Range    Protime 11.6 9.1 - 12.3 sec    INR 1.1        IMPRESSION: Patient is alert acutely injured 68-year-old male who sustained a fall from [facility] complaining of the left wrist and upper extremity pain he is right-hand dominant. Did lose consciousness no blood thinners. On examination patient is resting supine in bed will apply cervical collar as this is not placed by EMS personnel. The mechanism of trauma will obtain basic labs fast at bedside, pan scan for trauma protocol, likely consideration of trauma consult, possible orthopedic injuries. Will frequently reevaluate patient provide analgesia    RADIOLOGY:  XR HUMERUS LEFT (MIN 2 VIEWS)    Result Date: 3/25/2022  EXAMINATION: THREE XRAY VIEWS OF THE LEFT HAND; 4 XRAY VIEWS OF THE LEFT WRIST; TWO XRAY VIEWS OF THE LEFT FOREARM; TWO XRAY VIEWS OF THE LEFT HUMERUS; 3 XRAY VIEWS OF THE LEFT SHOULDER; THREE XRAY VIEWS OF THE LEFT ELBOW 3/25/2022 5:02 am COMPARISON: None. HISTORY: ORDERING SYSTEM PROVIDED HISTORY: pain sp fall 10 feet TECHNOLOGIST PROVIDED HISTORY: pain sp fall 10 feet Reason for Exam: best films,pt condition,unable to move arm FINDINGS: There is markedly comminuted distal left radius impaction fracture with moderate posterior displacement and dorsal angulation. The ulnar styloid is not well visualized and may be fractured. Surrounding soft tissue swelling is present at the wrist.  The left shoulder, left humerus, left elbow, left forearm, left wrist and left hand are otherwise unremarkable. Markedly comminuted distal left radius impaction fracture with dorsal angulation and displacement. Possible ulnar styloid fracture. XR ELBOW LEFT (MIN 3 VIEWS)    Result Date: 3/25/2022  EXAMINATION: THREE XRAY VIEWS OF THE LEFT HAND; 4 XRAY VIEWS OF THE LEFT WRIST; TWO XRAY VIEWS OF THE LEFT FOREARM; TWO XRAY VIEWS OF THE LEFT HUMERUS; 3 XRAY VIEWS OF THE LEFT SHOULDER; THREE XRAY VIEWS OF THE LEFT ELBOW 3/25/2022 5:02 am COMPARISON: None.  HISTORY: ORDERING SYSTEM PROVIDED HISTORY: pain sp fall 10 feet TECHNOLOGIST PROVIDED HISTORY: pain sp fall 10 feet Reason for Exam: best films,pt condition,unable to move arm FINDINGS: There is markedly comminuted distal left radius impaction fracture with moderate posterior displacement and dorsal angulation. The ulnar styloid is not well visualized and may be fractured. Surrounding soft tissue swelling is present at the wrist.  The left shoulder, left humerus, left elbow, left forearm, left wrist and left hand are otherwise unremarkable. Markedly comminuted distal left radius impaction fracture with dorsal angulation and displacement. Possible ulnar styloid fracture. XR RADIUS ULNA LEFT (2 VIEWS)    Result Date: 3/25/2022  EXAMINATION: THREE XRAY VIEWS OF THE LEFT HAND; 4 XRAY VIEWS OF THE LEFT WRIST; TWO XRAY VIEWS OF THE LEFT FOREARM; TWO XRAY VIEWS OF THE LEFT HUMERUS; 3 XRAY VIEWS OF THE LEFT SHOULDER; THREE XRAY VIEWS OF THE LEFT ELBOW 3/25/2022 5:02 am COMPARISON: None. HISTORY: ORDERING SYSTEM PROVIDED HISTORY: pain sp fall 10 feet TECHNOLOGIST PROVIDED HISTORY: pain sp fall 10 feet Reason for Exam: best films,pt condition,unable to move arm FINDINGS: There is markedly comminuted distal left radius impaction fracture with moderate posterior displacement and dorsal angulation. The ulnar styloid is not well visualized and may be fractured. Surrounding soft tissue swelling is present at the wrist.  The left shoulder, left humerus, left elbow, left forearm, left wrist and left hand are otherwise unremarkable. Markedly comminuted distal left radius impaction fracture with dorsal angulation and displacement. Possible ulnar styloid fracture. XR WRIST LEFT (2 VIEWS)    Result Date: 3/25/2022  EXAMINATION: XRAY VIEWS OF THE LEFT WRIST 3/25/2022 6:17 am COMPARISON: Approximately 1.5 hours earlier. HISTORY: ORDERING SYSTEM PROVIDED HISTORY: Post-Reduction TECHNOLOGIST PROVIDED HISTORY: Post-Reduction FINDINGS: There has been excellent reduction of the markedly comminuted distal left radial impaction fracture.   Alignment is near anatomic. There is a mildly displaced fracture of the ulnar styloid. Surrounding soft tissue swelling is present. Near anatomic alignment of the distal left radius fracture following reduction. XR WRIST LEFT (MIN 3 VIEWS)    Result Date: 3/25/2022  EXAMINATION: XRAY VIEWS OF THE LEFT WRIST 3/25/2022 6:18 am COMPARISON: Post reduction images obtained approximately 15 minutes earlier. HISTORY: ORDERING SYSTEM PROVIDED HISTORY: Post-Splint TECHNOLOGIST PROVIDED HISTORY: Post-Splint FINDINGS: Splint material has been applied. Near anatomic alignment of the distal left radius fracture is again noted as is mildly displaced ulnar styloid fracture. Moderate surrounding soft tissue swelling. Satisfactory appearance post splint. XR WRIST LEFT (MIN 3 VIEWS)    Result Date: 3/25/2022  EXAMINATION: THREE XRAY VIEWS OF THE LEFT HAND; 4 XRAY VIEWS OF THE LEFT WRIST; TWO XRAY VIEWS OF THE LEFT FOREARM; TWO XRAY VIEWS OF THE LEFT HUMERUS; 3 XRAY VIEWS OF THE LEFT SHOULDER; THREE XRAY VIEWS OF THE LEFT ELBOW 3/25/2022 5:02 am COMPARISON: None. HISTORY: ORDERING SYSTEM PROVIDED HISTORY: pain sp fall 10 feet TECHNOLOGIST PROVIDED HISTORY: pain sp fall 10 feet Reason for Exam: best films,pt condition,unable to move arm FINDINGS: There is markedly comminuted distal left radius impaction fracture with moderate posterior displacement and dorsal angulation. The ulnar styloid is not well visualized and may be fractured. Surrounding soft tissue swelling is present at the wrist.  The left shoulder, left humerus, left elbow, left forearm, left wrist and left hand are otherwise unremarkable. Markedly comminuted distal left radius impaction fracture with dorsal angulation and displacement. Possible ulnar styloid fracture.      XR HAND LEFT (MIN 3 VIEWS)    Result Date: 3/25/2022  EXAMINATION: THREE XRAY VIEWS OF THE LEFT HAND; 4 XRAY VIEWS OF THE LEFT WRIST; TWO XRAY VIEWS OF THE LEFT FOREARM; TWO XRAY VIEWS OF THE LEFT HUMERUS; 3 XRAY VIEWS OF THE LEFT SHOULDER; THREE XRAY VIEWS OF THE LEFT ELBOW 3/25/2022 5:02 am COMPARISON: None. HISTORY: ORDERING SYSTEM PROVIDED HISTORY: pain sp fall 10 feet TECHNOLOGIST PROVIDED HISTORY: pain sp fall 10 feet Reason for Exam: best films,pt condition,unable to move arm FINDINGS: There is markedly comminuted distal left radius impaction fracture with moderate posterior displacement and dorsal angulation. The ulnar styloid is not well visualized and may be fractured. Surrounding soft tissue swelling is present at the wrist.  The left shoulder, left humerus, left elbow, left forearm, left wrist and left hand are otherwise unremarkable. Markedly comminuted distal left radius impaction fracture with dorsal angulation and displacement. Possible ulnar styloid fracture. CT HEAD WO CONTRAST    Result Date: 3/25/2022  EXAMINATION: CT OF THE HEAD WITHOUT CONTRAST; CT OF THE CERVICAL SPINE WITHOUT CONTRAST 3/25/2022 3:44 am TECHNIQUE: CT of the head was performed without the administration of intravenous contrast. Dose modulation, iterative reconstruction, and/or weight based adjustment of the mA/kV was utilized to reduce the radiation dose to as low as reasonably achievable.; CT of the cervical spine was performed without the administration of intravenous contrast. Multiplanar reformatted images are provided for review. Dose modulation, iterative reconstruction, and/or weight based adjustment of the mA/kV was utilized to reduce the radiation dose to as low as reasonably achievable. COMPARISON: None.  HISTORY: ORDERING SYSTEM PROVIDED HISTORY: fall 10 feet loc TECHNOLOGIST PROVIDED HISTORY: fall 10 feet loc Decision Support Exception - unselect if not a suspected or confirmed emergency medical condition->Emergency Medical Condition (MA); ORDERING SYSTEM PROVIDED HISTORY: fall 10 feet loc TECHNOLOGIST PROVIDED HISTORY: fall 10 feet loc Decision Support Exception - unselect if not a suspected or confirmed emergency medical condition->Emergency Medical Condition (MA) FINDINGS: HEAD: BRAIN/VENTRICLES: There is no acute intracranial hemorrhage, mass effect or midline shift. No abnormal extra-axial fluid collection. The gray-white differentiation is maintained. There is no evidence of hydrocephalus. ORBITS: The visualized portion of the orbits demonstrate no acute abnormality. SINUSES: The visualized paranasal sinuses and mastoid air cells demonstrate no acute abnormality. SOFT TISSUES/SKULL:  No acute abnormality of the visualized skull or soft tissues. CERVICAL SPINE: BONES/ALIGNMENT: There is no evidence of an acute cervical spine fracture. No traumatic malalignment. DEGENERATIVE CHANGES: No significant degenerative changes. SOFT TISSUES: There is no prevertebral soft tissue swelling. No acute CT abnormality identified in the brain. No acute osseous abnormality identified in the cervical spine. CT CERVICAL SPINE WO CONTRAST    Result Date: 3/25/2022  EXAMINATION: CT OF THE HEAD WITHOUT CONTRAST; CT OF THE CERVICAL SPINE WITHOUT CONTRAST 3/25/2022 3:44 am TECHNIQUE: CT of the head was performed without the administration of intravenous contrast. Dose modulation, iterative reconstruction, and/or weight based adjustment of the mA/kV was utilized to reduce the radiation dose to as low as reasonably achievable.; CT of the cervical spine was performed without the administration of intravenous contrast. Multiplanar reformatted images are provided for review. Dose modulation, iterative reconstruction, and/or weight based adjustment of the mA/kV was utilized to reduce the radiation dose to as low as reasonably achievable. COMPARISON: None.  HISTORY: ORDERING SYSTEM PROVIDED HISTORY: fall 10 feet loc TECHNOLOGIST PROVIDED HISTORY: fall 10 feet loc Decision Support Exception - unselect if not a suspected or confirmed emergency medical condition->Emergency Medical Condition (MA); ORDERING SYSTEM PROVIDED minimally displaced fracture of the ulnar styloid. Anatomic alignment of the joints of the wrist.  Joint spaces are preserved. No significant degenerative changes. Soft Tissue: Expected soft tissue swelling about the wrist.  No subcutaneous gas. Muscles/tendons: Normal muscle bulk for patient age. Comminuted, nondisplaced, non angulated fracture distal radial metaphysis without definite intra-articular extension. Displaced fracture of the ulnar styloid Fracture alignment is overall unchanged in comparison to radiographs of 03/25/2022     XR SHOULDER LEFT (MIN 2 VIEWS)    Result Date: 3/25/2022  EXAMINATION: THREE XRAY VIEWS OF THE LEFT HAND; 4 XRAY VIEWS OF THE LEFT WRIST; TWO XRAY VIEWS OF THE LEFT FOREARM; TWO XRAY VIEWS OF THE LEFT HUMERUS; 3 XRAY VIEWS OF THE LEFT SHOULDER; THREE XRAY VIEWS OF THE LEFT ELBOW 3/25/2022 5:02 am COMPARISON: None. HISTORY: ORDERING SYSTEM PROVIDED HISTORY: pain sp fall 10 feet TECHNOLOGIST PROVIDED HISTORY: pain sp fall 10 feet Reason for Exam: best films,pt condition,unable to move arm FINDINGS: There is markedly comminuted distal left radius impaction fracture with moderate posterior displacement and dorsal angulation. The ulnar styloid is not well visualized and may be fractured. Surrounding soft tissue swelling is present at the wrist.  The left shoulder, left humerus, left elbow, left forearm, left wrist and left hand are otherwise unremarkable. Markedly comminuted distal left radius impaction fracture with dorsal angulation and displacement. Possible ulnar styloid fracture. CT CHEST ABDOMEN PELVIS W CONTRAST    Result Date: 3/25/2022  EXAMINATION: CT OF THE CHEST, ABDOMEN, AND PELVIS WITH CONTRAST; CT OF THE LUMBAR SPINE WITHOUT CONTRAST; CT OF THE THORACIC SPINE WITHOUT CONTRAST 3/25/2022 3:47 am TECHNIQUE: CT of the chest, abdomen and pelvis was performed with the administration of intravenous contrast. Multiplanar reformatted images are provided for review. patent. Bones/Soft Tissues: Pelvic alignment maintained. No fracture identified. No soft tissue hematoma. THORACIC: BONES/ALIGNMENT: Mild superior endplate deformity of T5 is age indeterminate and may be related to degenerative change versus acute fracture. The remainder of the thoracic vertebral body heights are maintained. DEGENERATIVE CHANGES: No gross spinal canal stenosis or bony neural foraminal narrowing of the thoracic spine. SOFT TISSUES: No paraspinal hematoma. LUMBAR: BONES/ALIGNMENT: There is normal alignment of the spine. The vertebral body heights are maintained. No osseous destructive lesion is seen. DEGENERATIVE CHANGES: No gross spinal canal stenosis or bony neural foraminal narrowing of the lumbar spine. SOFT TISSUES: No paraspinal hematoma. 1.  No acute traumatic injury identified in the chest, abdomen or pelvis. 2.  Mild superior endplate deformity of T5 is of uncertain acuity and may be related to disc disease versus acute trauma. 3.  Unremarkable appearance of the lumbar spine. CT LUMBAR SPINE TRAUMA RECONSTRUCTION    Result Date: 3/25/2022  EXAMINATION: CT OF THE CHEST, ABDOMEN, AND PELVIS WITH CONTRAST; CT OF THE LUMBAR SPINE WITHOUT CONTRAST; CT OF THE THORACIC SPINE WITHOUT CONTRAST 3/25/2022 3:47 am TECHNIQUE: CT of the chest, abdomen and pelvis was performed with the administration of intravenous contrast. Multiplanar reformatted images are provided for review. Dose modulation, iterative reconstruction, and/or weight based adjustment of the mA/kV was utilized to reduce the radiation dose to as low as reasonably achievable.; CT of the lumbar spine was performed without the administration of intravenous contrast. Multiplanar reformatted images are provided for review. Adjustment of mA and/or kV according to patient size was utilized.  Dose modulation, iterative reconstruction, and/or weight based adjustment of the mA/kV was utilized to reduce the radiation dose to as low as alignment of the spine. The vertebral body heights are maintained. No osseous destructive lesion is seen. DEGENERATIVE CHANGES: No gross spinal canal stenosis or bony neural foraminal narrowing of the lumbar spine. SOFT TISSUES: No paraspinal hematoma. 1.  No acute traumatic injury identified in the chest, abdomen or pelvis. 2.  Mild superior endplate deformity of T5 is of uncertain acuity and may be related to disc disease versus acute trauma. 3.  Unremarkable appearance of the lumbar spine. CT THORACIC SPINE TRAUMA RECONSTRUCTION    Result Date: 3/25/2022  EXAMINATION: CT OF THE CHEST, ABDOMEN, AND PELVIS WITH CONTRAST; CT OF THE LUMBAR SPINE WITHOUT CONTRAST; CT OF THE THORACIC SPINE WITHOUT CONTRAST 3/25/2022 3:47 am TECHNIQUE: CT of the chest, abdomen and pelvis was performed with the administration of intravenous contrast. Multiplanar reformatted images are provided for review. Dose modulation, iterative reconstruction, and/or weight based adjustment of the mA/kV was utilized to reduce the radiation dose to as low as reasonably achievable.; CT of the lumbar spine was performed without the administration of intravenous contrast. Multiplanar reformatted images are provided for review. Adjustment of mA and/or kV according to patient size was utilized. Dose modulation, iterative reconstruction, and/or weight based adjustment of the mA/kV was utilized to reduce the radiation dose to as low as reasonably achievable.; CT of the thoracic spine was performed without the administration of intravenous contrast. Multiplanar reformatted images are provided for review. Dose modulation, iterative reconstruction, and/or weight based adjustment of the mA/kV was utilized to reduce the radiation dose to as low as reasonably achievable.  COMPARISON: CT abdomen and pelvis 01/08/2020 HISTORY: ORDERING SYSTEM PROVIDED HISTORY: Trauma TECHNOLOGIST PROVIDED HISTORY: Trauma Decision Support Exception - unselect if not a suspected or confirmed emergency medical condition->Emergency Medical Condition (MA); ORDERING SYSTEM PROVIDED HISTORY: Trauma TECHNOLOGIST PROVIDED HISTORY: Trauma; ORDERING SYSTEM PROVIDED HISTORY: trauma TECHNOLOGIST PROVIDED HISTORY: trauma FINDINGS: Chest: Mediastinum: No acute aortic abnormality identified. No mediastinal hematoma. No pericardial effusion. Lungs/pleura: No acute airspace disease, pneumothorax or effusion. Soft Tissues/Bones: No acute osseous abnormality identified in this region. No soft tissue hematoma. Abdomen/Pelvis: Organs: No solid organ injury identified. No acute inflammatory process. GI/Bowel: There is no bowel dilatation or wall thickening identified. Pelvis: No acute findings. Appropriate excretion of contrast is demonstrated into the bladder. Peritoneum/Retroperitoneum: No free air. No free fluid. The aorta is normal in caliber. The visceral branches are patent. Bones/Soft Tissues: Pelvic alignment maintained. No fracture identified. No soft tissue hematoma. THORACIC: BONES/ALIGNMENT: Mild superior endplate deformity of T5 is age indeterminate and may be related to degenerative change versus acute fracture. The remainder of the thoracic vertebral body heights are maintained. DEGENERATIVE CHANGES: No gross spinal canal stenosis or bony neural foraminal narrowing of the thoracic spine. SOFT TISSUES: No paraspinal hematoma. LUMBAR: BONES/ALIGNMENT: There is normal alignment of the spine. The vertebral body heights are maintained. No osseous destructive lesion is seen. DEGENERATIVE CHANGES: No gross spinal canal stenosis or bony neural foraminal narrowing of the lumbar spine. SOFT TISSUES: No paraspinal hematoma. 1.  No acute traumatic injury identified in the chest, abdomen or pelvis. 2.  Mild superior endplate deformity of T5 is of uncertain acuity and may be related to disc disease versus acute trauma. 3.  Unremarkable appearance of the lumbar spine.        EKG      All

## 2022-03-25 NOTE — ED NOTES
Trauma team at bedside. c-collar removed.   Gaurav Grier, JENNIFER  03/25/22 Benjie Reid, JENNIFER  03/25/22 1805

## 2022-03-25 NOTE — CONSULTS
[facility]   IN-PATIENT SERVICE      NEUROSURGERY CONSULT  NOTE            Date:   3/25/2022  Patient name:  Jeffrey Cheadle  Date of admission:  3/25/2022  YOB: 1995      Chief Complaint:     Fall    Reason for Consult:      Fall    History of Present Illness: The patient is a 32 y.o. male who presents with complaint of fall, patient stated that his cell phone dropped in a dumpster he got in to the dumpster to retrieve it at which time a garbage truck came to retrieve the garbage patient was approximately 10 to 12 feet in the air when he fell out of the dumpster, patient lost his consciousness, complaining of significant left upper extremity pain most significantly in the area of his left wrist,    CT thoracic: Mild superior endplate deformity of T5 uncertain acuity,  -Rest of the images were negative    Past Medical History:     Past Medical History:   Diagnosis Date    Anxiety     Depression     History of substance abuse ([facility] [address]         Past Surgical History:     Past Surgical History:   Procedure Laterality Date    HUMERUS FRACTURE SURGERY Right 09/22/2021    ORIF    HUMERUS FRACTURE SURGERY Right 9/22/2021    ORIF DISTAL HUMERUS performed by Shayy Walton MD at [facility] N/A 01/08/2020    APPENDECTOMY LAPAROSCOPIC performed by Chica Jorgensen MD at [place] [address]        Medications Prior to Admission:     Prior to Admission medications    Medication Sig Start Date End Date Taking? Authorizing Provider   gabapentin (NEURONTIN) 400 MG capsule Take 400 mg by mouth 3 times daily. Historical Provider, MD   buprenorphine-naloxone (SUBOXONE) 8-2 MG FILM SL film Place 1 Film under the tongue daily. Historical Provider, MD        Allergies:     Shellfish-derived products    Social History:     Tobacco:    reports that he has been smoking. He has never used smokeless tobacco.  Alcohol:      reports previous alcohol use.   Drug Use:  reports previous drug use. Drugs: Opiates , IV, Marijuana ([place]), and Cocaine. Family History:     Family History   Problem Relation Age of Onset    Hypertension Other        Review of Systems:       Constitutional Negative for fever and chills   HEENT Negative for ear discharge, ear pain, nosebleed   Eyes Negative for photophobia, pain and discharge   Respiratory Negative for hemoptysis and sputum   Cardiovascular Negative for orthopnea, claudication and PND   Gastrointestinal Negative for abdominal pain, diarrhea, blood in stool   Musculoskeletal Negative for joint pain, negative for myalgia   Skin Negative for rash or itching   hematology Negative for ecchymosis, anemia   Psychiatric Negative for suicidal ideation, anxiety, depression, hallucinations       Physical Exam:   /75   Pulse 76   Temp 98.2 °F (36.8 °C)   Resp 16   Ht 5' 9\" (1.753 m)   Wt 230 lb (104.3 kg)   SpO2 92%   BMI 33.97 kg/m²   Temp (24hrs), Av.2 °F (36.8 °C), Min:98.2 °F (36.8 °C), Max:98.2 °F (36.8 °C)        General examination:      General Appearance:  alert, well appearing, and in no acute distress  HEENT: Normocephalic, atraumatic, moist mucus membranes  Neck: supple, no carotid bruits, (-) nuchal rigidity  Lungs:  Respirations unlabored, chest wall no deformity, BS normal  Cardiovascular: normal rate, regular rhythm  Abdomen: Soft, nontender, nondistended, normal bowel sounds  Skin: No gross lesions, rashes, bruising or bleeding on exposed skin area  Extremities:  peripheral pulses palpable, no cyanosis, clubbing or edema  Psych: normal affect      Neurological examination:      Mental status   Alert and oriented x 3; following all commands;   speech is fluent, no dysarthria, aphasia.       Cranial nerves   II - visual fields intact to confrontation; pupils reactive  III, IV, VI - extraocular muscles intact; no PARAG; no nystagmus; no ptosis   V - normal facial sensation VII - normal facial symmetry                                                             VIII - intact hearing                                                                             IX, X - symmetrical palate elevation                                               XI - symmetrical shoulder shrug                                                       XII - midline tongue without atrophy or fasciculation     Motor function  Strength:   5/5 RUE, 5/5 RLE  Unable to move due to pain, broken wrist LUE, 5/5  LLE  Normal bulk and tone. Sensory function Intact to touch, pin, vibration, proprioception throughout     Cerebellar Intact finger-nose-finger testing. Intact heel-shin testing. No dysdiadochokinesia present. No tremors                        Reflex function 2/4 symmetric throughout . Downgoing plantar response bilaterally. (-)Garibay's sign bilaterally      Gait                   not assessed           Diagnostics:      Laboratory Testing:  CBC:   Recent Labs     03/25/22  0343   WBC 6.9   HGB 13.1        BMP:  No results for input(s): NA, K, CL, CO2, BUN, CREATININE, GLUCOSE in the last 72 hours.       Lab Results   Component Value Date    CHOL 105 05/17/2018    LDLCHOLESTEROL 60 05/17/2018    HDL 29 (L) 05/17/2018    TRIG 82 05/17/2018    ALT 26 07/27/2019    AST 21 07/27/2019    TSH 0.39 05/17/2018       No results found for: PHENYTOIN, PHENYTOIN, VALPROATE, CBMZ      Imaging/Diagnostics:  CT HEAD WO CONTRAST    Result Date: 3/25/2022  EXAMINATION: CT OF THE HEAD WITHOUT CONTRAST; CT OF THE CERVICAL SPINE WITHOUT CONTRAST 3/25/2022 3:44 am TECHNIQUE: CT of the head was performed without the administration of intravenous contrast. Dose modulation, iterative reconstruction, and/or weight based adjustment of the mA/kV was utilized to reduce the radiation dose to as low as reasonably achievable.; CT of the cervical spine was performed without the administration of intravenous radiation dose to as low as reasonably achievable.; CT of the cervical spine was performed without the administration of intravenous contrast. Multiplanar reformatted images are provided for review. Dose modulation, iterative reconstruction, and/or weight based adjustment of the mA/kV was utilized to reduce the radiation dose to as low as reasonably achievable. COMPARISON: None. HISTORY: ORDERING SYSTEM PROVIDED HISTORY: fall 10 feet loc TECHNOLOGIST PROVIDED HISTORY: fall 10 feet loc Decision Support Exception - unselect if not a suspected or confirmed emergency medical condition->Emergency Medical Condition (MA); ORDERING SYSTEM PROVIDED HISTORY: fall 10 feet loc TECHNOLOGIST PROVIDED HISTORY: fall 10 feet loc Decision Support Exception - unselect if not a suspected or confirmed emergency medical condition->Emergency Medical Condition (MA) FINDINGS: HEAD: BRAIN/VENTRICLES: There is no acute intracranial hemorrhage, mass effect or midline shift. No abnormal extra-axial fluid collection. The gray-white differentiation is maintained. There is no evidence of hydrocephalus. ORBITS: The visualized portion of the orbits demonstrate no acute abnormality. SINUSES: The visualized paranasal sinuses and mastoid air cells demonstrate no acute abnormality. SOFT TISSUES/SKULL:  No acute abnormality of the visualized skull or soft tissues. CERVICAL SPINE: BONES/ALIGNMENT: There is no evidence of an acute cervical spine fracture. No traumatic malalignment. DEGENERATIVE CHANGES: No significant degenerative changes. SOFT TISSUES: There is no prevertebral soft tissue swelling. No acute CT abnormality identified in the brain. No acute osseous abnormality identified in the cervical spine.      XR CHEST PORTABLE    Result Date: 3/12/2022  EXAMINATION: ONE XRAY VIEW OF THE CHEST 3/12/2022 12:48 pm COMPARISON: 09/24/2017, CT abdomen and pelvis 01/08/2020 HISTORY: ORDERING SYSTEM PROVIDED HISTORY: SOB CP TECHNOLOGIST PROVIDED HISTORY: SOB, CP FINDINGS: Sclerosis of the distal 1st ribs, left greater than right, similar to prior. Borderline cardiomegaly, grossly similar to prior. Cardiomediastinal silhouette is intact. No focal consolidation, pneumothorax, or pleural effusion identified on this limited portable study. Osseous structures are grossly intact. No acute cardiopulmonary findings. VL DUP LOWER EXTREMITY VENOUS BILATERAL    Result Date: 3/13/2022    [facility]  Vascular Lower Extremities DVT Study Procedure   Patient Name   [facility]     Date of Study           03/12/2022                 TRESSA CARRILLO   Date of Birth  1995  Gender                  Male   Age            32 year(s)  Race                       Room Number    34   Corporate ID # [id number]   Patient Acct # [de-identified]   MR #           [id number]     Christel Child, [facility]   Accession #    [id number]  Interpreting Physician  Rosanna Jacskon   Referring                  Referring Physician     Micaela Parker DO  Nurse  Practitioner  Procedure Type of Study:   Veins: Lower Extremities DVT Study, Venous Scan Lower Bilateral.  Indications for Study:R/O DVT. Patient Status: In Patient. Technical Quality:Adequate visualization. Conclusions   Summary   No evidence of superficial or deep venous thrombosis in both lower  extremities.    Signature   ----------------------------------------------------------------  Electronically signed by Gay Clifton RVT(Sonographer) on  03/12/2022 04:22 PM  ----------------------------------------------------------------   ----------------------------------------------------------------  Electronically signed by Vinson Fusi Reyes,Arthur(Interpreting  physician) on 03/13/2022 08:32 PM  ----------------------------------------------------------------  Findings:   Right Impression:                    Left Impression:  The common femoral, femoral,         The common femoral, femoral,  popliteal and tibial veins           popliteal and tibial veins  demonstrate normal compressibility   demonstrate normal compressibility  and augmentation. and augmentation. Normal compressibility of the great  Normal compressibility of the great  saphenous vein. saphenous vein. Normal compressibility of the small  Normal compressibility of the small  saphenous vein. saphenous vein. Velocities are measured in cm/s ; Diameters are measured in cm Right Lower Extremities DVT Study Measurements Right 2D Measurements +------------------------------------+----------+---------------+----------+ ! Location                            ! Visualized! Compressibility! Thrombosis! +------------------------------------+----------+---------------+----------+ ! Common Femoral                      !Yes       ! Yes            ! None      ! +------------------------------------+----------+---------------+----------+ ! Prox Femoral                        !Yes       ! Yes            ! None      ! +------------------------------------+----------+---------------+----------+ ! Mid Femoral                         !Yes       ! Yes            ! None      ! +------------------------------------+----------+---------------+----------+ ! Dist Femoral                        !Yes       ! Yes            ! None      ! +------------------------------------+----------+---------------+----------+ ! Popliteal                           !Yes       ! Yes            ! None      ! +------------------------------------+----------+---------------+----------+ ! Sapheno Femoral Junction            ! Yes       ! Yes            ! None      ! +------------------------------------+----------+---------------+----------+ ! PTV                                 ! Yes       ! Yes            ! None      ! +------------------------------------+----------+---------------+----------+ ! Peroneal                            !Yes       ! Yes            ! None ! +------------------------------------+----------+---------------+----------+ ! Gastroc                             ! Yes       ! Yes            ! None      ! +------------------------------------+----------+---------------+----------+ ! GSV Thigh                           ! Yes       ! Yes            ! None      ! +------------------------------------+----------+---------------+----------+ ! GSV Knee                            ! Yes       ! Yes            ! None      ! +------------------------------------+----------+---------------+----------+ ! GSV Ankle                           ! Yes       ! Yes            ! None      ! +------------------------------------+----------+---------------+----------+ ! SSV                                 ! Yes       ! Yes            ! None      ! +------------------------------------+----------+---------------+----------+ Right Doppler Measurements +---------------------------+------+------+--------------------------------+ ! Location                   ! Signal!Reflux! Reflux (msec)                   ! +---------------------------+------+------+--------------------------------+ ! Common Femoral             !Phasic!      !                                ! +---------------------------+------+------+--------------------------------+ ! Prox Femoral               !Phasic!      !                                ! +---------------------------+------+------+--------------------------------+ ! Popliteal                  !Phasic!      !                                ! +---------------------------+------+------+--------------------------------+ Left Lower Extremities DVT Study Measurements Left 2D Measurements +------------------------------------+----------+---------------+----------+ ! Location                            ! Visualized! Compressibility! Thrombosis! +------------------------------------+----------+---------------+----------+ ! Common Femoral                      !Yes       ! Yes            ! None      ! +------------------------------------+----------+---------------+----------+ ! Prox Femoral                        !Yes       ! Yes            ! None      ! +------------------------------------+----------+---------------+----------+ ! Mid Femoral                         !Yes       ! Yes            ! None      ! +------------------------------------+----------+---------------+----------+ ! Dist Femoral                        !Yes       ! Yes            ! None      ! +------------------------------------+----------+---------------+----------+ ! Popliteal                           !Yes       ! Yes            ! None      ! +------------------------------------+----------+---------------+----------+ ! Sapheno Femoral Junction            ! Yes       ! Yes            ! None      ! +------------------------------------+----------+---------------+----------+ ! PTV                                 ! Yes       ! Yes            ! None      ! +------------------------------------+----------+---------------+----------+ ! Peroneal                            !Yes       ! Yes            ! None      ! +------------------------------------+----------+---------------+----------+ ! Gastroc                             ! Yes       ! Yes            ! None      ! +------------------------------------+----------+---------------+----------+ ! GSV Thigh                           ! Yes       ! Yes            ! None      ! +------------------------------------+----------+---------------+----------+ ! GSV Knee                            ! Yes       ! Yes            ! None      ! +------------------------------------+----------+---------------+----------+ ! GSV Ankle                           ! Yes       ! Yes            ! None      ! +------------------------------------+----------+---------------+----------+ ! SSV                                 ! Yes       ! Yes            ! None      ! +------------------------------------+----------+---------------+----------+ Left Doppler Measurements +---------------------------+------+------+--------------------------------+ ! Location                   ! Signal!Reflux! Reflux (msec)                   ! +---------------------------+------+------+--------------------------------+ ! Common Femoral             !Phasic!      !                                ! +---------------------------+------+------+--------------------------------+ ! Prox Femoral               !Phasic!      !                                ! +---------------------------+------+------+--------------------------------+ ! Popliteal                  !Phasic!      !                                ! +---------------------------+------+------+--------------------------------+    CT CHEST ABDOMEN PELVIS W CONTRAST    Result Date: 3/25/2022  EXAMINATION: CT OF THE CHEST, ABDOMEN, AND PELVIS WITH CONTRAST; CT OF THE LUMBAR SPINE WITHOUT CONTRAST; CT OF THE THORACIC SPINE WITHOUT CONTRAST 3/25/2022 3:47 am TECHNIQUE: CT of the chest, abdomen and pelvis was performed with the administration of intravenous contrast. Multiplanar reformatted images are provided for review. Dose modulation, iterative reconstruction, and/or weight based adjustment of the mA/kV was utilized to reduce the radiation dose to as low as reasonably achievable.; CT of the lumbar spine was performed without the administration of intravenous contrast. Multiplanar reformatted images are provided for review. Adjustment of mA and/or kV according to patient size was utilized. Dose modulation, iterative reconstruction, and/or weight based adjustment of the mA/kV was utilized to reduce the radiation dose to as low as reasonably achievable.; CT of the thoracic spine was performed without the administration of intravenous contrast. Multiplanar reformatted images are provided for review.  Dose modulation, iterative reconstruction, and/or weight based adjustment of the mA/kV was utilized to reduce the radiation dose to as low as reasonably achievable. COMPARISON: CT abdomen and pelvis 01/08/2020 HISTORY: ORDERING SYSTEM PROVIDED HISTORY: Trauma TECHNOLOGIST PROVIDED HISTORY: Trauma Decision Support Exception - unselect if not a suspected or confirmed emergency medical condition->Emergency Medical Condition (MA); ORDERING SYSTEM PROVIDED HISTORY: Trauma TECHNOLOGIST PROVIDED HISTORY: Trauma; ORDERING SYSTEM PROVIDED HISTORY: trauma TECHNOLOGIST PROVIDED HISTORY: trauma FINDINGS: Chest: Mediastinum: No acute aortic abnormality identified. No mediastinal hematoma. No pericardial effusion. Lungs/pleura: No acute airspace disease, pneumothorax or effusion. Soft Tissues/Bones: No acute osseous abnormality identified in this region. No soft tissue hematoma. Abdomen/Pelvis: Organs: No solid organ injury identified. No acute inflammatory process. GI/Bowel: There is no bowel dilatation or wall thickening identified. Pelvis: No acute findings. Appropriate excretion of contrast is demonstrated into the bladder. Peritoneum/Retroperitoneum: No free air. No free fluid. The aorta is normal in caliber. The visceral branches are patent. Bones/Soft Tissues: Pelvic alignment maintained. No fracture identified. No soft tissue hematoma. THORACIC: BONES/ALIGNMENT: Mild superior endplate deformity of T5 is age indeterminate and may be related to degenerative change versus acute fracture. The remainder of the thoracic vertebral body heights are maintained. DEGENERATIVE CHANGES: No gross spinal canal stenosis or bony neural foraminal narrowing of the thoracic spine. SOFT TISSUES: No paraspinal hematoma. LUMBAR: BONES/ALIGNMENT: There is normal alignment of the spine. The vertebral body heights are maintained. No osseous destructive lesion is seen. DEGENERATIVE CHANGES: No gross spinal canal stenosis or bony neural foraminal narrowing of the lumbar spine. SOFT TISSUES: No paraspinal hematoma.      1.  No acute traumatic injury identified in the chest, abdomen or hematoma. No pericardial effusion. Lungs/pleura: No acute airspace disease, pneumothorax or effusion. Soft Tissues/Bones: No acute osseous abnormality identified in this region. No soft tissue hematoma. Abdomen/Pelvis: Organs: No solid organ injury identified. No acute inflammatory process. GI/Bowel: There is no bowel dilatation or wall thickening identified. Pelvis: No acute findings. Appropriate excretion of contrast is demonstrated into the bladder. Peritoneum/Retroperitoneum: No free air. No free fluid. The aorta is normal in caliber. The visceral branches are patent. Bones/Soft Tissues: Pelvic alignment maintained. No fracture identified. No soft tissue hematoma. THORACIC: BONES/ALIGNMENT: Mild superior endplate deformity of T5 is age indeterminate and may be related to degenerative change versus acute fracture. The remainder of the thoracic vertebral body heights are maintained. DEGENERATIVE CHANGES: No gross spinal canal stenosis or bony neural foraminal narrowing of the thoracic spine. SOFT TISSUES: No paraspinal hematoma. LUMBAR: BONES/ALIGNMENT: There is normal alignment of the spine. The vertebral body heights are maintained. No osseous destructive lesion is seen. DEGENERATIVE CHANGES: No gross spinal canal stenosis or bony neural foraminal narrowing of the lumbar spine. SOFT TISSUES: No paraspinal hematoma. 1.  No acute traumatic injury identified in the chest, abdomen or pelvis. 2.  Mild superior endplate deformity of T5 is of uncertain acuity and may be related to disc disease versus acute trauma. 3.  Unremarkable appearance of the lumbar spine.      CT THORACIC SPINE TRAUMA RECONSTRUCTION    Result Date: 3/25/2022  EXAMINATION: CT OF THE CHEST, ABDOMEN, AND PELVIS WITH CONTRAST; CT OF THE LUMBAR SPINE WITHOUT CONTRAST; CT OF THE THORACIC SPINE WITHOUT CONTRAST 3/25/2022 3:47 am TECHNIQUE: CT of the chest, abdomen and pelvis was performed with the administration of intravenous contrast. Multiplanar reformatted images are provided for review. Dose modulation, iterative reconstruction, and/or weight based adjustment of the mA/kV was utilized to reduce the radiation dose to as low as reasonably achievable.; CT of the lumbar spine was performed without the administration of intravenous contrast. Multiplanar reformatted images are provided for review. Adjustment of mA and/or kV according to patient size was utilized. Dose modulation, iterative reconstruction, and/or weight based adjustment of the mA/kV was utilized to reduce the radiation dose to as low as reasonably achievable.; CT of the thoracic spine was performed without the administration of intravenous contrast. Multiplanar reformatted images are provided for review. Dose modulation, iterative reconstruction, and/or weight based adjustment of the mA/kV was utilized to reduce the radiation dose to as low as reasonably achievable. COMPARISON: CT abdomen and pelvis 01/08/2020 HISTORY: ORDERING SYSTEM PROVIDED HISTORY: Trauma TECHNOLOGIST PROVIDED HISTORY: Trauma Decision Support Exception - unselect if not a suspected or confirmed emergency medical condition->Emergency Medical Condition (MA); ORDERING SYSTEM PROVIDED HISTORY: Trauma TECHNOLOGIST PROVIDED HISTORY: Trauma; ORDERING SYSTEM PROVIDED HISTORY: trauma TECHNOLOGIST PROVIDED HISTORY: trauma FINDINGS: Chest: Mediastinum: No acute aortic abnormality identified. No mediastinal hematoma. No pericardial effusion. Lungs/pleura: No acute airspace disease, pneumothorax or effusion. Soft Tissues/Bones: No acute osseous abnormality identified in this region. No soft tissue hematoma. Abdomen/Pelvis: Organs: No solid organ injury identified. No acute inflammatory process. GI/Bowel: There is no bowel dilatation or wall thickening identified. Pelvis: No acute findings. Appropriate excretion of contrast is demonstrated into the bladder. Peritoneum/Retroperitoneum: No free air. No free fluid.   The aorta is normal in caliber. The visceral branches are patent. Bones/Soft Tissues: Pelvic alignment maintained. No fracture identified. No soft tissue hematoma. THORACIC: BONES/ALIGNMENT: Mild superior endplate deformity of T5 is age indeterminate and may be related to degenerative change versus acute fracture. The remainder of the thoracic vertebral body heights are maintained. DEGENERATIVE CHANGES: No gross spinal canal stenosis or bony neural foraminal narrowing of the thoracic spine. SOFT TISSUES: No paraspinal hematoma. LUMBAR: BONES/ALIGNMENT: There is normal alignment of the spine. The vertebral body heights are maintained. No osseous destructive lesion is seen. DEGENERATIVE CHANGES: No gross spinal canal stenosis or bony neural foraminal narrowing of the lumbar spine. SOFT TISSUES: No paraspinal hematoma. 1.  No acute traumatic injury identified in the chest, abdomen or pelvis. 2.  Mild superior endplate deformity of T5 is of uncertain acuity and may be related to disc disease versus acute trauma. 3.  Unremarkable appearance of the lumbar spine.            Impression:      The patient is a 32 y.o. male who presents with complaint of fall, patient stated that his cell phone dropped in a dumpster he got in to the dumpster to retrieve it at which time a garbage truck came to retrieve the garbage patient was approximately 10 to 12 feet in the air when he fell out of the dumpster, patient lost his consciousness, complaining of significant left upper extremity pain most significantly in the area of his left wrist,    CT thoracic: Mild superior endplate deformity of T5 uncertain acuity,  -Rest of the images were negative    Plan:     - No acute intervention needed at the moment  - Medical management per primary team  -Further recommendation may follow          Electronically signed by Milton Jauregui MD on 3/25/2022 at 5:17 AM      Electronically signed by   Milton Jauregui MD  3/25/2022  5:17 AM

## 2022-03-25 NOTE — ED NOTES
The following labs labeled with pt sticker and tubed to lab:     [] Blue     [] Lavender   [] on ice  [] Green/yellow  [] Green/black [] on ice  [] Yellow  [] Red  [] Pink      [] COVID-19 swab    [] Rapid  [] PCR  [] Flu swab  [] Peds Viral Panel     [x] Urine Sample  [] Pelvic Cultures  [] Blood Cultures            Lizett Nixon RN  03/25/22 8354

## 2022-03-25 NOTE — ED PROVIDER NOTES
[address] HANDOFF       Handoff taken on the following patient from prior Attending Physician:  Pt Name: Nikhil Maddox  PCP:  No primary care provider on file. Attestation  I was available and discussed any additional care issues that arose and coordinated the management plans with the resident(s) caring for the patient during my duty period. Any areas of disagreement with resident's documentation of care or procedures are noted on the chart. I was personally present for the key portions of any/all procedures during my duty period. I have documented in the chart those procedures where I was not present during the key portions.              Bri Burgos MD  03/25/22 4534

## 2022-03-25 NOTE — FLOWSHEET NOTE
[facility]     Emergency/Trauma Note    PATIENT NAME: Len Mcknight    Shift date: 3/24/2022  Shift day: Wednesday   Shift # 3    Room # 11/11   Name: Len Mcknight            Age: 32 y.o. Gender: male          Alevism: Non-Restorationist   Place of Caodaism: Unknown    Trauma/Incident type: Adult Trauma Consult  Admit Date & Time: 3/25/2022  3:26 AM  TRAUMA NAME: N/A    ADVANCE DIRECTIVES IN CHART? No    NAME OF DECISION MAKER: Per next of kin hierarchy, patient's mother is his primary decision maker. RELATIONSHIP OF DECISION MAKER TO PATIENT: Patient's Mother    PATIENT/EVENT DESCRIPTION:  Len Mcknight is a 32 y.o. male who arrived to ED11 and was paged out as an \"Adult Trauma Consult,\" due to a \"Fall. \" Per report, patient \"fell into a garbage truck, as he was attempting to retrieve a cell phone that he lost in a dumpster. \" Pt to be admitted to 11/11. SPIRITUAL ASSESSMENT/INTERVENTION:   introduced herself to patient in room. Patient was coping well and receiving care from the orthopedic team, as patient sustained a \"wrist fracture. \" Patient indicated that no one is aware of his arrival to the ED and he declined 's offer to contact support. Patient confirmed that his mother and girlfriend are his main support.  provided comfort and hospitality to patient in room. Patient thanked  for visit. PATIENT BELONGINGS:  No belongings noted    ANY BELONGINGS OF SIGNIFICANT VALUE NOTED:  N/A    REGISTRATION STAFF NOTIFIED? No      WHAT IS YOUR SPIRITUAL CARE PLAN FOR THIS PATIENT?:  [facility] can make follow-up visit, per request. Zaria Bowman can be reached 24/7 via [employer].      03/25/22 6645   Encounter Summary   Services provided to: Patient   Referral/Consult From: Multi-disciplinary team  (Adult Trauma Consult)   Support System Significant other;Parent   Continue Visiting   (3/24/2022)   Complexity of Encounter Moderate   Length of Encounter 30 minutes   Spiritual Assessment Completed Yes   Routine   Type Initial   Crisis   Type Trauma   Spiritual/Uatsdin   Type Spiritual support   Assessment Calm; Approachable; Hopeful;Coping   Intervention Sustaining presence/ Ministry of presence   Outcome Receptive     Electronically signed by Perry Yoo on 3/25/2022 at 6:51 AM.  [facility]  [phone]

## 2022-03-25 NOTE — CONSULTS
Orthopedic Surgery Consult  (Dr. John Martinez)    CC/Reason for consult:  Left distal radius fracture     HPI:      The patient is a RHD 32 y.o. male with the above complaint being consulted for further evaluation. Patient was apparently taking out the trash when he then dropped his phone in the dumpster and fell into it. Felt immediat epain to left wrist after injury. Denies numbness/tingling. Was a previous heroin user. Denies any significant medical history. Vitals were reviewed. Patient does not complain of any other orthopedic issues. Past Medical History:    Past Medical History:   Diagnosis Date    Anxiety     Depression     History of substance abuse ([facility] [address]        Past Surgical History:    Past Surgical History:   Procedure Laterality Date    HUMERUS FRACTURE SURGERY Right 09/22/2021    ORIF    HUMERUS FRACTURE SURGERY Right 9/22/2021    ORIF DISTAL HUMERUS performed by Sukhdeep Quispe MD at [facility] N/A 01/08/2020    APPENDECTOMY LAPAROSCOPIC performed by Monse Howell MD at [place] [address]       Medications Prior to Admission:   Prior to Admission medications    Medication Sig Start Date End Date Taking? Authorizing Provider   gabapentin (NEURONTIN) 400 MG capsule Take 400 mg by mouth 3 times daily. Historical Provider, MD   buprenorphine-naloxone (SUBOXONE) 8-2 MG FILM SL film Place 1 Film under the tongue daily.     Historical Provider, MD       Allergies:    Shellfish-derived products    Social History:   Social History     Socioeconomic History    Marital status: Single     Spouse name: Not on file    Number of children: Not on file    Years of education: Not on file    Highest education level: Not on file   Occupational History    Not on file   Tobacco Use    Smoking status: Current Every Day Smoker    Smokeless tobacco: Never Used   Vaping Use    Vaping Use: Former   Substance and Sexual Activity    Alcohol use: Not Currently Comment: none since 2017    Drug use: Not Currently     Types: Opiates , IV, Marijuana (Vicki Mad), Cocaine     Comment: last time 3 weeks ago crack and weed    Sexual activity: Not on file   Other Topics Concern    Not on file   Social History Narrative    Not on file     Social Determinants of Health     Financial Resource Strain:     Difficulty of Paying Living Expenses: Not on file   Food Insecurity:     Worried About Running Out of Food in the Last Year: Not on file    [place] of Food in the Last Year: Not on file   Transportation Needs:     Lack of Transportation (Medical): Not on file    Lack of Transportation (Non-Medical): Not on file   Physical Activity:     Days of Exercise per Week: Not on file    Minutes of Exercise per Session: Not on file   Stress:     Feeling of Stress : Not on file   Social Connections:     Frequency of Communication with Friends and Family: Not on file    Frequency of Social Gatherings with Friends and Family: Not on file    Attends Gnosticism Services: Not on file    Active Member of [address] [place] [address] or Organizations: Not on file    Attends Club or Organization Meetings: Not on file    Marital Status: Not on file   Intimate Partner Violence:     Fear of Current or Ex-Partner: Not on file    Emotionally Abused: Not on file    Physically Abused: Not on file    Sexually Abused: Not on file   Housing Stability:     Unable to Pay for Housing in the Last Year: Not on file    Number of [place] in the Last Year: Not on file    Unstable Housing in the Last Year: Not on file       Family History:  Family History   Problem Relation Age of Onset    Hypertension Other        REVIEW OF SYSTEMS:   Constitutional: Negative for fever and chills. Cardiovascular: Negative for chest pain and palpitations. Musculoskeletal: As described in the HPI. Skin: Negative for itching and rash. PHYSICAL EXAM:  Blood pressure 128/85, pulse 78, temperature 98.2 °F (36.8 °C), resp.  rate 10, height 5' 9\" (1.753 m), weight 230 lb (104.3 kg), SpO2 96 %. Gen: -Alert, cooperative      Chest: Non labored breathing. Cardiovascular: Regular rate, no dependent edema, distal pulses 2+    Respiratory: Chest symmetric, no accessory muscle use, normal respirations    LUE: Obvious wrist deformity present. No open lesion or ulceration. Skin intact. Tender to palpation to left wrist. Ulnar/Median/AIN/PIN/Radial motor intact. Sensation intact to light touch to axillary/musc/radial/ulnar/median nerve distributions. Radial pulse 2+ with BCR    LABS:  Recent Labs     03/25/22  0343 03/25/22  0524   WBC 6.9  --    HGB 13.1  --    HCT 38.6*  --      --    NA  --  134*   K  --  3.7   BUN  --  16   CREATININE  --  0.72   GLUCOSE  --  113*        Radiology:    XR of left wrist demonstrating a intrarticular and displaced distal radius fracture in dorsal direction.  Comminution present dorsally    Impression 32 y.o. male being seen after consultation for the following problems:  1) left distal radius fracture     Plan  - Patient reduced and placed into reverse sugar tong splint on the left upper extremity under hematoma block  - Weight bearing status: No pushing, pulling, or lifting to left upper extremity   - Pain control at emergency department discretion  - Ice (20 minutes on and off 1 hour) and elevate above the level of the heart  to reduce swelling and throbbing pain.  - Ok to DC from ortho side  - F/u Dr. Sharri Rucker 7-10d  - Please page Ortho with any questions or concerns    Tristan Villarreal DO  Orthopedic Surgery Resident, PGY-3  [address]

## 2022-03-25 NOTE — ED PROVIDER NOTES
[facility] ED  Emergency Department  Emergency Medicine Resident Sign-out     Care of Junior Moraes was assumed from Dr. Arian Haddad and is being seen for Fall   . The patient's initial evaluation and plan have been discussed with the prior provider who initially evaluated the patient. EMERGENCY DEPARTMENT COURSE / MEDICAL DECISION MAKING:       MEDICATIONS GIVEN:  Orders Placed This Encounter   Medications    morphine injection 4 mg    Tetanus-Diphth-Acell Pertussis (BOOSTRIX) injection 0.5 mL    DISCONTD: iopamidol (ISOVUE-370) 76 % injection 75 mL    iopamidol (ISOVUE-370) 76 % injection 130 mL    Tetanus-Diphth-Acell Pertussis (BOOSTRIX) 5-2.5-18.5 LF-MCG/0.5 injection     Chucho Boot: cabinet override    morphine injection 4 mg    lidocaine 1 % injection 20 mL    fentaNYL (SUBLIMAZE) injection 50 mcg       LABS / RADIOLOGY:     Labs Reviewed   CBC WITH AUTO DIFFERENTIAL - Abnormal; Notable for the following components:       Result Value    Hematocrit 38.6 (*)     Eosinophils % 5 (*)     All other components within normal limits   COMPREHENSIVE METABOLIC PANEL - Abnormal; Notable for the following components:    Glucose 113 (*)     Sodium 134 (*)     ALT 46 (*)     AST 50 (*)     Total Bilirubin 0.27 (*)     Total Protein 6.2 (*)     All other components within normal limits   URINE DRUG SCREEN - Abnormal; Notable for the following components:    Cocaine Metabolite, Urine POSITIVE (*)     Opiates, Urine POSITIVE (*)     Cannabinoid Scrn, Ur POSITIVE (*)     All other components within normal limits   COVID-19, RAPID   SPECIMEN REJECTION   LIPASE   PROTIME-INR   PREVIOUS SPECIMEN   PREVIOUS SPECIMEN       CT WRIST LEFT WO CONTRAST   Final Result   Comminuted, nondisplaced, non angulated fracture distal radial metaphysis   without definite intra-articular extension.       Displaced fracture of the ulnar styloid      Fracture alignment is overall unchanged in comparison to radiographs of   03/25/2022         XR WRIST LEFT (MIN 3 VIEWS)   Final Result   Satisfactory appearance post splint. XR WRIST LEFT (2 VIEWS)   Final Result   Near anatomic alignment of the distal left radius fracture following   reduction. XR SHOULDER LEFT (MIN 2 VIEWS)   Final Result   Markedly comminuted distal left radius impaction fracture with dorsal   angulation and displacement. Possible ulnar styloid fracture. XR ELBOW LEFT (MIN 3 VIEWS)   Final Result   Markedly comminuted distal left radius impaction fracture with dorsal   angulation and displacement. Possible ulnar styloid fracture. XR WRIST LEFT (MIN 3 VIEWS)   Final Result   Markedly comminuted distal left radius impaction fracture with dorsal   angulation and displacement. Possible ulnar styloid fracture. XR HAND LEFT (MIN 3 VIEWS)   Final Result   Markedly comminuted distal left radius impaction fracture with dorsal   angulation and displacement. Possible ulnar styloid fracture. XR HUMERUS LEFT (MIN 2 VIEWS)   Final Result   Markedly comminuted distal left radius impaction fracture with dorsal   angulation and displacement. Possible ulnar styloid fracture. XR RADIUS ULNA LEFT (2 VIEWS)   Final Result   Markedly comminuted distal left radius impaction fracture with dorsal   angulation and displacement. Possible ulnar styloid fracture. CT HEAD WO CONTRAST   Final Result   No acute CT abnormality identified in the brain. No acute osseous abnormality identified in the cervical spine. CT CERVICAL SPINE WO CONTRAST   Final Result   No acute CT abnormality identified in the brain. No acute osseous abnormality identified in the cervical spine. CT CHEST ABDOMEN PELVIS W CONTRAST   Final Result   1. No acute traumatic injury identified in the chest, abdomen or pelvis.       2.  Mild superior endplate deformity of T5 is of uncertain acuity and may be   related to disc disease versus acute trauma. 3.  Unremarkable appearance of the lumbar spine. CT THORACIC SPINE TRAUMA RECONSTRUCTION   Final Result   1. No acute traumatic injury identified in the chest, abdomen or pelvis. 2.  Mild superior endplate deformity of T5 is of uncertain acuity and may be   related to disc disease versus acute trauma. 3.  Unremarkable appearance of the lumbar spine. CT LUMBAR SPINE TRAUMA RECONSTRUCTION   Final Result   1. No acute traumatic injury identified in the chest, abdomen or pelvis. 2.  Mild superior endplate deformity of T5 is of uncertain acuity and may be   related to disc disease versus acute trauma. 3.  Unremarkable appearance of the lumbar spine. RECENT VITALS:     Temp: 98.2 °F (36.8 °C),  Pulse: 74, Resp: 13, BP: 129/74, SpO2: 93 %    This patient is a 32 y.o. Male after fall from approximately 12 feet complaining of left arm pain, right-hand-dominant, found to have distal radius fracture and possible T5 endplate fracture. Orthopedic surgery was consulted, left wrist was reduced and splinted. Neurosurgery consulted, awaiting recommendations and disposition per trauma    OUTSTANDING TASKS / RECOMMENDATIONS:      1. Await neurosurgery recommendation     FINAL IMPRESSION:     1. Fall, initial encounter    2. Fall from height of greater than 3 feet    3.  Closed fracture of distal ends of left radius and ulna, initial encounter        DISPOSITION:       DISPOSITION:  []  Discharge   []  Transfer -    []  Admission -     []  Against Medical Advice   []  Eloped   FOLLOW-UP[address] [address]  [phone]  In 1 week      [facility]  [address] [facility]  [phone]    As needed, If symptoms worsen     [facility]  [phone]  In 1 week       DISCHARGE MEDICATIONS: New Prescriptions    No medications on file          Orlando Cee DO  Emergency Medicine Resident  [address] [place], [place]  Resident  03/25/22 1575

## 2022-03-25 NOTE — ED TRIAGE NOTES
Pt stated he dropped his phone in dumpster and was retrieving it when the dumpster was being picked up. Pt jumped from inside the dumpster that was in air approx ten feet to ground> Pt reported he braced himself with his left hand while landing on his right side. Bruising noted to right side of face near eye socket and elbow. Pt states he lost consciousness. Complained of right rib pain. C-Collar applied Per dr Arian Haddad at bedside with nurse.

## 2022-03-28 ENCOUNTER — HOSPITAL ENCOUNTER (EMERGENCY)
Age: 27
Discharge: HOME | End: 2022-03-28
Attending: EMERGENCY MEDICINE
Payer: MEDICARE

## 2022-03-28 VITALS
SYSTOLIC BLOOD PRESSURE: 120 MMHG | DIASTOLIC BLOOD PRESSURE: 67 MMHG | TEMPERATURE: 98.4 F | OXYGEN SATURATION: 93 % | RESPIRATION RATE: 20 BRPM | HEART RATE: 78 BPM

## 2022-03-28 DIAGNOSIS — T40.601A OPIATE OVERDOSE, ACCIDENTAL OR UNINTENTIONAL, INITIAL ENCOUNTER (HCC): Primary | ICD-10-CM

## 2022-03-28 PROCEDURE — 99285 EMERGENCY DEPT VISIT HI MDM: CPT

## 2022-03-28 PROCEDURE — G0396 ALCOHOL/SUBS INTERV 15-30MN: HCPCS | Performed by: SOCIAL WORKER

## 2022-03-28 PROCEDURE — 6370000000 HC RX 637 (ALT 250 FOR IP): Performed by: STUDENT IN AN ORGANIZED HEALTH CARE EDUCATION/TRAINING PROGRAM

## 2022-03-28 RX ORDER — ONDANSETRON 4 MG/1
4 TABLET, ORALLY DISINTEGRATING ORAL ONCE
Status: COMPLETED | OUTPATIENT
Start: 2022-03-28 | End: 2022-03-28

## 2022-03-28 RX ORDER — ONDANSETRON 2 MG/ML
4 INJECTION INTRAMUSCULAR; INTRAVENOUS ONCE
Status: DISCONTINUED | OUTPATIENT
Start: 2022-03-28 | End: 2022-03-28

## 2022-03-28 RX ADMIN — ONDANSETRON 4 MG: 4 TABLET, ORALLY DISINTEGRATING ORAL at 13:49

## 2022-03-28 ASSESSMENT — ENCOUNTER SYMPTOMS
NAUSEA: 1
CONSTIPATION: 0
SINUS PRESSURE: 0
SORE THROAT: 0
SHORTNESS OF BREATH: 0
FACIAL SWELLING: 0
CHEST TIGHTNESS: 0
TROUBLE SWALLOWING: 0
VOMITING: 0
SINUS PAIN: 0
DIARRHEA: 0
COLOR CHANGE: 0
ABDOMINAL PAIN: 0
WHEEZING: 0
COUGH: 0

## 2022-03-28 NOTE — ED PROVIDER NOTES
[address]  ED  Emergency Department Encounter  Emergency Medicine Resident     Pt Name: Mark Pérez  MRN: [id number]  [place] 1995  Date of evaluation: 3/28/22  PCP:  No primary care provider on file. CHIEF COMPLAINT       Chief Complaint   Patient presents with    Drug Overdose     pt was brought via EMS for using fentanyl via nasal and was narcan d/t knowing pt used (denies OD) and now needs pt tp be medically cleared to return to facility     Emesis       HISTORY OFPRESENT ILLNESS  (Location/Symptom, Timing/Onset, Context/Setting, Quality, Duration, Modifying Factors,Severity.)      Mark Pérez is a 32 y. o.yo male who presents with EMS after drug overdose. Patient was being checked into detox facility when he became unresponsive, drug overdose. Patient admits to snorting 40 of fentanyl. He received 3 rounds of intranasal Narcan by EMS. Patient does state that he feels nauseated at this point in time but otherwise denies any concerns or complaints. Patient states he has overdosed before and has received fentanyl. He does want treatment and would like to go back to the detox facility. Detox symptom here for medical clearance. PAST MEDICAL / SURGICAL / SOCIAL / FAMILY HISTORY      has a past medical history of Anxiety, Depression, and History of substance abuse ([facility] [address] has a past surgical history that includes knee surgery; laparoscopic appendectomy (N/A, 01/08/2020); Humerus fracture surgery (Right, 09/22/2021); and Humerus fracture surgery (Right, 9/22/2021).      Social History     Socioeconomic History    Marital status: Single     Spouse name: Not on file    Number of children: Not on file    Years of education: Not on file    Highest education level: Not on file   Occupational History    Not on file   Tobacco Use    Smoking status: Current Every Day Smoker    Smokeless tobacco: Never Used   Vaping Use    Vaping Use: Former   Substance and Sexual Activity    Alcohol use: Not Currently     Comment: none since 2017    Drug use: Not Currently     Types: Opiates , IV, Marijuana ([facility]), Cocaine     Comment: last time 3 weeks ago crack and weed    Sexual activity: Not on file   Other Topics Concern    Not on file   Social History Narrative    Not on file     Social Determinants of Health     Financial Resource Strain:     Difficulty of Paying Living Expenses: Not on file   Food Insecurity:     Worried About Running Out of Food in the Last Year: Not on file    [place] of Food in the Last Year: Not on file   Transportation Needs:     Lack of Transportation (Medical): Not on file    Lack of Transportation (Non-Medical): Not on file   Physical Activity:     Days of Exercise per Week: Not on file    Minutes of Exercise per Session: Not on file   Stress:     Feeling of Stress : Not on file   Social Connections:     Frequency of Communication with Friends and Family: Not on file    Frequency of Social Gatherings with Friends and Family: Not on file    Attends Mandaen Services: Not on file    Active Member of [address] [place] [address] or Organizations: Not on file    Attends Club or Organization Meetings: Not on file    Marital Status: Not on file   Intimate Partner Violence:     Fear of Current or Ex-Partner: Not on file    Emotionally Abused: Not on file    Physically Abused: Not on file    Sexually Abused: Not on file   Housing Stability:     Unable to Pay for Housing in the Last Year: Not on file    Number of [place] in the Last Year: Not on file    Unstable Housing in the Last Year: Not on file       Family History   Problem Relation Age of Onset    Hypertension Other         Allergies:  Shellfish-derived products    Home Medications:  Prior to Admission medications    Medication Sig Start Date End Date Taking? Authorizing Provider   gabapentin (NEURONTIN) 400 MG capsule Take 400 mg by mouth 3 times daily.     Historical Provider, MD buprenorphine-naloxone (SUBOXONE) 8-2 MG FILM SL film Place 1 Film under the tongue daily. Historical Provider, MD       REVIEW OFSYSTEMS    (2-9 systems for level 4, 10 or more for level 5)      Review of Systems   Constitutional: Negative for chills, diaphoresis, fatigue and fever. HENT: Negative for facial swelling, nosebleeds, sinus pressure, sinus pain, sore throat and trouble swallowing. Respiratory: Negative for cough, chest tightness, shortness of breath and wheezing. Cardiovascular: Negative for chest pain, palpitations and leg swelling. Gastrointestinal: Positive for nausea. Negative for abdominal pain, constipation, diarrhea and vomiting. Genitourinary: Negative for dysuria, flank pain and hematuria. Musculoskeletal: Negative for arthralgias, gait problem, neck pain and neck stiffness. Skin: Negative for color change, rash and wound. Neurological: Positive for syncope. Negative for dizziness, weakness, light-headedness and headaches. PHYSICAL EXAM   (up to 7 for level 4, 8 or more forlevel 5)      INITIAL VITALS:   ED Triage Vitals [03/28/22 1312]   BP Temp Temp Source Pulse Resp SpO2 Height Weight   (!) 158/84 98.4 °F (36.9 °C) Oral 110 20 99 % -- --       Physical Exam  Constitutional:       General: He is not in acute distress. Appearance: He is normal weight. He is not ill-appearing. HENT:      Head: Normocephalic and atraumatic. Right Ear: External ear normal.      Left Ear: External ear normal.      Nose: Nose normal.      Mouth/Throat:      Mouth: Mucous membranes are moist.      Pharynx: Oropharynx is clear. No posterior oropharyngeal erythema. Eyes:      General: No scleral icterus. Extraocular Movements: Extraocular movements intact. Conjunctiva/sclera: Conjunctivae normal.      Pupils: Pupils are equal, round, and reactive to light.       Comments: Right periorbital ecchymosis   Cardiovascular:      Rate and Rhythm: Normal rate and regular rhythm. Pulses: Normal pulses. Heart sounds: Normal heart sounds. Pulmonary:      Effort: Pulmonary effort is normal. No respiratory distress. Breath sounds: Normal breath sounds. Abdominal:      General: Abdomen is flat. Bowel sounds are normal. There is no distension. Palpations: Abdomen is soft. Tenderness: There is no abdominal tenderness. Musculoskeletal:         General: Normal range of motion. Cervical back: Normal range of motion. No muscular tenderness. Skin:     General: Skin is warm and dry. Neurological:      General: No focal deficit present. Mental Status: He is alert and oriented to person, place, and time. Cranial Nerves: No cranial nerve deficit. DIFFERENTIAL  DIAGNOSIS     PLAN (LABS / IMAGING / EKG):  No orders of the defined types were placed in this encounter. MEDICATIONS ORDERED:  Orders Placed This Encounter   Medications    DISCONTD: ondansetron (ZOFRAN) injection 4 mg    ondansetron (ZOFRAN-ODT) disintegrating tablet 4 mg       DDX: Drug overdose    Initial MDM/Plan: 32 y.o. male who presents with EMS after fentanyl overdose. Patient was checking into a detox facility when he admitted to snorting fentanyl. Patient received 3 rounds of intranasal Narcan. Patient is alert and oriented at this time complaining of nausea. Would like to go back to detox facility will reach out to his detox facility. Will observe patient to ensure Narcan does not wear off. DIAGNOSTIC RESULTS / EMERGENCYDEPARTMENT COURSE / MDM     LABS:  Labs Reviewed - No data to display      RADIOLOGY:  No results found. EKG      All EKG's are interpreted by the Emergency Department Physicianwho either signs or Co-signs this chart in the absence of a cardiologist.    EMERGENCY DEPARTMENT COURSE:  ED Course as of 03/28/22 1649   Mon Mar 28, 2022   1358 Social work discussed with patient's detox facility.   He is allowed to return though he must be observed for 4 hours [CD]   1531 COWS score of 3 [CD]   1155 Patient has been seen and observed for an extended period of time. Patient is not requiring any medication for withdrawal or overdose. Will discharge back to the detox facility. [CD]      ED Course User Index  [CD] Alvarado Plunkett DO          PROCEDURES:  None    CONSULTS:  None    CRITICAL CARE:  Please see attending documentation    FINAL IMPRESSION      1.  Opiate overdose, accidental or unintentional, initial encounter [facility])          DISPOSITION / PLAN     DISPOSITION    Discharge     PATIENT REFERRED TO:  [facility]  [address] [place] [address]  [phone]  Go to   If symptoms worsen      DISCHARGE MEDICATIONS:  New Prescriptions    No medications on file       Alvarado Plunkett DO  Emergency Medicine Resident    (Please note that portions of this note were completed with a voice recognition program.Efforts were made to edit the dictations but occasionally words are mis-transcribed.)       Alvarado Plunkett DO  Resident  03/28/22 4343

## 2022-03-28 NOTE — ED NOTES
Pt to ED via EMS a/o x4 to triage. Per pt he was at [facility] for rehab snorted heroin and was given 3 rounds of IN narcan. Pt became alert and orientated. Pt stated he needs medical clearance to return back to [facility]. Pt denies any chest pain or SOB. Pt placed on monitor, call light is in reach.  NAD noted at this time      Dinora Alvarez RN  03/28/22 9838

## 2022-03-28 NOTE — ED PROVIDER NOTES
exam he is currently nontoxic afebrile borderline tachycardic other vital signs are normal.  Impression is opioid overdose, accidental.  Plan is observation, Narcan kit for home, anticipate Suboxone distention, social work consultation for follow-up to MAT program.    COWS score 3 at this time    Patient is actually in a detox program at this time. They were the ones administered Narcan. Has not yet received buprenorphine there. Will have social work evaluate. Rui Medal.  Odalis Jarquin MD, [facility]  Attending Emergency  Physician               Guillermina Paula MD  03/28/22 9787       Guillermina Paula MD  03/28/22 [address] [facility] MD Christopher  03/28/22 4410

## 2022-03-28 NOTE — ED NOTES
Writer spoke with Jacques Galvez of [facility] detox who stated patient OD'd while attempting to complete intake assessment. Jacques Galvez stated patient must be observed for 4 hours due to narcan administration. Jacques Galvez stated patient may then return to their [address] [place] [address] location for an after-hours admission. Jacques Galvez requested ED staff call [phone] to notify them of patient's discharge.       BRANDON Wells  03/28/22 0168

## 2022-03-28 NOTE — ED NOTES
Writer contacted [facility], spoke with Jacqueline, informed her that patient's being discharged, Sergio Early will voucher to their location. Jacqueline requested taxi  after 5:30pm as they're serving dinner & won't have staff available before 5:30pm. Writer to relay timeframe request to JENNIFER.       BRANDON Carrillo  03/28/22 9517

## 2022-03-28 NOTE — ED NOTES
Writer met with patient at bedside to complete the SBIRT assessment. The results can be located in the ED navigator under screening questions.      Patient scored as following:    AUDIT (Alcohol Screening Questionnaire): 0  DAST (Drug Screening Questionnaire): 8  PHQ-9 (Depression Screening Questionnaire): 0      The patient was provided with the following resources/interventions: Return to [facility] Time 13:35  End Time 13:45   Diagnosis Z71.51       Alber Comp, LISW  03/28/22 1556

## 2022-03-30 DIAGNOSIS — M25.532 WRIST PAIN, LEFT: Primary | ICD-10-CM
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
## 2022-04-07 ENCOUNTER — APPOINTMENT (OUTPATIENT)
Dept: GENERAL RADIOLOGY | Age: 27
End: 2022-04-07
Payer: MEDICARE

## 2022-04-07 ENCOUNTER — OFFICE VISIT (OUTPATIENT)
Dept: ORTHOPEDIC SURGERY | Age: 27
End: 2022-04-07
Payer: MEDICARE

## 2022-04-07 ENCOUNTER — HOSPITAL ENCOUNTER (EMERGENCY)
Age: 27
Discharge: HOME | End: 2022-04-07
Attending: EMERGENCY MEDICINE
Payer: MEDICARE

## 2022-04-07 VITALS — WEIGHT: 230 LBS | HEIGHT: 70 IN | BODY MASS INDEX: 32.93 KG/M2

## 2022-04-07 VITALS
RESPIRATION RATE: 18 BRPM | BODY MASS INDEX: 32.93 KG/M2 | TEMPERATURE: 97.2 F | DIASTOLIC BLOOD PRESSURE: 92 MMHG | OXYGEN SATURATION: 96 % | HEIGHT: 70 IN | WEIGHT: 230 LBS | SYSTOLIC BLOOD PRESSURE: 158 MMHG | HEART RATE: 109 BPM

## 2022-04-07 DIAGNOSIS — M25.462 KNEE EFFUSION, LEFT: ICD-10-CM

## 2022-04-07 DIAGNOSIS — S52.532A CLOSED COLLES' FRACTURE OF LEFT RADIUS, INITIAL ENCOUNTER: Primary | ICD-10-CM

## 2022-04-07 DIAGNOSIS — S52.572A OTHER CLOSED INTRA-ARTICULAR FRACTURE OF DISTAL END OF LEFT RADIUS, INITIAL ENCOUNTER: Primary | ICD-10-CM

## 2022-04-07 PROCEDURE — G8417 CALC BMI ABV UP PARAM F/U: HCPCS | Performed by: STUDENT IN AN ORGANIZED HEALTH CARE EDUCATION/TRAINING PROGRAM

## 2022-04-07 PROCEDURE — 99283 EMERGENCY DEPT VISIT LOW MDM: CPT

## 2022-04-07 PROCEDURE — 6370000000 HC RX 637 (ALT 250 FOR IP): Performed by: STUDENT IN AN ORGANIZED HEALTH CARE EDUCATION/TRAINING PROGRAM

## 2022-04-07 PROCEDURE — 4004F PT TOBACCO SCREEN RCVD TLK: CPT | Performed by: STUDENT IN AN ORGANIZED HEALTH CARE EDUCATION/TRAINING PROGRAM

## 2022-04-07 PROCEDURE — 99203 OFFICE O/P NEW LOW 30 MIN: CPT | Performed by: STUDENT IN AN ORGANIZED HEALTH CARE EDUCATION/TRAINING PROGRAM

## 2022-04-07 PROCEDURE — 73564 X-RAY EXAM KNEE 4 OR MORE: CPT

## 2022-04-07 PROCEDURE — G8427 DOCREV CUR MEDS BY ELIG CLIN: HCPCS | Performed by: STUDENT IN AN ORGANIZED HEALTH CARE EDUCATION/TRAINING PROGRAM

## 2022-04-07 PROCEDURE — 73110 X-RAY EXAM OF WRIST: CPT

## 2022-04-07 RX ORDER — ACETAMINOPHEN 500 MG
1000 TABLET ORAL ONCE
Status: COMPLETED | OUTPATIENT
Start: 2022-04-07 | End: 2022-04-07

## 2022-04-07 RX ADMIN — ACETAMINOPHEN 1000 MG: 500 TABLET ORAL at 05:08

## 2022-04-07 ASSESSMENT — ENCOUNTER SYMPTOMS
DIARRHEA: 0
RHINORRHEA: 0
NAUSEA: 0
VOMITING: 0
COUGH: 0
ABDOMINAL PAIN: 0

## 2022-04-07 ASSESSMENT — PAIN DESCRIPTION - LOCATION: LOCATION: KNEE

## 2022-04-07 ASSESSMENT — PAIN DESCRIPTION - PAIN TYPE: TYPE: ACUTE PAIN

## 2022-04-07 ASSESSMENT — PAIN SCALES - GENERAL
PAINLEVEL_OUTOF10: 5
PAINLEVEL_OUTOF10: 5

## 2022-04-07 ASSESSMENT — PAIN DESCRIPTION - DESCRIPTORS: DESCRIPTORS: DISCOMFORT;THROBBING

## 2022-04-07 ASSESSMENT — PAIN - FUNCTIONAL ASSESSMENT: PAIN_FUNCTIONAL_ASSESSMENT: 0-10
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
## 2022-04-07 NOTE — ED NOTES
Pt presents to the ED with c/o lt knee pain. VSS. No distress noted. Call light within reach.       Swathi Bowling RN  04/07/22 3948

## 2022-04-07 NOTE — ED PROVIDER NOTES
[address]  ED  Emergency Department Encounter  Emergency Medicine Resident     Pt Name: Scott Guillory  MRN: [id number]  [place] 1995  Date of evaluation: 4/7/22  PCP:  No primary care provider on file. This patient was evaluated in the Emergency Department for symptoms described in the history of present illness. The patient was evaluated in the context of the global COVID-19 pandemic, which necessitated consideration that the patient might be at risk for infection with the SARS-CoV-2 virus that causes COVID-19. Institutional protocols and algorithms that pertain to the evaluation of patients at risk for COVID-19 are in a state of rapid change based on information released by regulatory bodies including the CDC and federal and state organizations. These policies and algorithms were followed during the patient's care in the ED. CHIEF COMPLAINT       Chief Complaint   Patient presents with    Knee Pain     HISTORY OFPRESENT ILLNESS  (Location/Symptom, Timing/Onset, Context/Setting, Quality, Duration, Modifying Factors,Severity.)      Scott Guillory is a 32 y.o. male who presents with left knee pain as well as his left wrist splint having been removed. Patient was initially seen on 3/25/2022 after having fallen out of the dumpster as it was being picked up to have the trash emptied into the garbage truck. Patient sustained a Colles' fracture of the left wrist at that time as well as a possible T5 endplate deformity and other work-up was negative. At that time patient did not have imaging of the left knee. Patient states that over the last couple days he has not had any injuries to the knee. He complains of pain over the medial side. He has been walking without difficulty or limp. Denies any fevers. Patient did have splint placed on 3/25/2022 by orthopedics for the Colles' fracture after having been reduced with hematoma block in the emergency department.   He was scheduled to follow-up with Dr. Rosie Shelton in 7 to 10 days but patient had not done so yet. He states that the splint fell off last night as he had been taking on and off his T-shirts and the wrapping became loose. Patient does have history of right knee surgery. PAST MEDICAL / SURGICAL / SOCIAL / FAMILY HISTORY      has a past medical history of Anxiety, Depression, and History of substance abuse ([facility], [place] [address] has a past surgical history that includes knee surgery; laparoscopic appendectomy (N/A, 01/08/2020); Humerus fracture surgery (Right, 09/22/2021); and Humerus fracture surgery (Right, 9/22/2021). Social History     Socioeconomic History    Marital status: Single     Spouse name: Not on file    Number of children: Not on file    Years of education: Not on file    Highest education level: Not on file   Occupational History    Not on file   Tobacco Use    Smoking status: Current Every Day Smoker    Smokeless tobacco: Never Used   Vaping Use    Vaping Use: Former   Substance and Sexual Activity    Alcohol use: Not Currently     Comment: none since 2017    Drug use: Not Currently     Types: Opiates , IV, Marijuana (Kellee Crome), Cocaine     Comment: last time 3 weeks ago crack and weed    Sexual activity: Not on file   Other Topics Concern    Not on file   Social History Narrative    Not on file     Social Determinants of Health     Financial Resource Strain:     Difficulty of Paying Living Expenses: Not on file   Food Insecurity:     Worried About Running Out of Food in the Last Year: Not on file    [place] of Food in the Last Year: Not on file   Transportation Needs:     Lack of Transportation (Medical): Not on file    Lack of Transportation (Non-Medical):  Not on file   Physical Activity:     Days of Exercise per Week: Not on file    Minutes of Exercise per Session: Not on file   Stress:     Feeling of Stress : Not on file   Social Connections:     Frequency of Communication with Friends and Family: Not on file    Frequency of Social Gatherings with Friends and Family: Not on file    Attends Sabianist Services: Not on file    Active Member of Clubs or Organizations: Not on file    Attends Club or Organization Meetings: Not on file    Marital Status: Not on file   Intimate Partner Violence:     Fear of Current or Ex-Partner: Not on file    Emotionally Abused: Not on file    Physically Abused: Not on file    Sexually Abused: Not on file   Housing Stability:     Unable to Pay for Housing in the Last Year: Not on file    Number of [place] in the Last Year: Not on file    Unstable Housing in the Last Year: Not on file       Family History   Problem Relation Age of Onset    Hypertension Other        Allergies:  Shellfish-derived products    Home Medications:  Prior to Admission medications    Medication Sig Start Date End Date Taking? Authorizing Provider   gabapentin (NEURONTIN) 400 MG capsule Take 400 mg by mouth 3 times daily. Historical Provider, MD   buprenorphine-naloxone (SUBOXONE) 8-2 MG FILM SL film Place 1 Film under the tongue daily. Historical Provider, MD       REVIEW OF SYSTEMS    (2-9 systems for level 4, 10 or more for level 5)      Review of Systems   Constitutional: Negative for activity change and fever. HENT: Negative for congestion and rhinorrhea. Eyes: Negative for visual disturbance. Bruising around right eye. Respiratory: Negative for cough. Cardiovascular: Negative for chest pain. Gastrointestinal: Negative for abdominal pain, diarrhea, nausea and vomiting. Genitourinary: Negative for dysuria. Musculoskeletal: Positive for arthralgias. Skin: Negative for wound. Neurological: Negative for headaches. PHYSICAL EXAM   (up to 7 for level 4, 8 or more for level 5)     INITIAL VITALS:    height is 5' 10\" (1.778 m) and weight is 230 lb (104.3 kg). His oral temperature is 97.2 °F (36.2 °C).  His blood pressure is 158/92 (abnormal) and his pulse is 109. His respiration is 18 and oxygen saturation is 96%. Physical Exam  Constitutional:       General: He is not in acute distress. Appearance: Normal appearance. HENT:      Head:      Comments: Patient with periorbital ecchymosis on right. Nose: Nose normal.      Mouth/Throat:      Mouth: Mucous membranes are moist.   Eyes:      Extraocular Movements: Extraocular movements intact. Pupils: Pupils are equal, round, and reactive to light. Cardiovascular:      Rate and Rhythm: Normal rate and regular rhythm. Pulses: Normal pulses. Heart sounds: Normal heart sounds. Pulmonary:      Effort: Pulmonary effort is normal. No respiratory distress. Breath sounds: Normal breath sounds. Abdominal:      General: There is no distension. Palpations: Abdomen is soft. Tenderness: There is no abdominal tenderness. Musculoskeletal:      Cervical back: Normal range of motion. No rigidity or tenderness. Comments: Patient endorses some tenderness over the left medial knee around the MCL. Minimal swelling noted at this site. Normal range of motion of the knee. Negative logroll bilaterally. Negative anterior and posterior drawer test.  No laxity noted in the medial collateral ligament nor lateral collateral ligament. No erythema or warmth noted to the joint. Patient with obvious deformity to the left wrist.  Tender mass noted throughout the left wrist.  Distal perfusion is intact. Normal range of motion of the fingers on the left hand. Lymphadenopathy:      Cervical: No cervical adenopathy. Skin:     General: Skin is warm. Neurological:      General: No focal deficit present. Mental Status: He is alert and oriented to person, place, and time.        DIFFERENTIAL  DIAGNOSIS     PLAN (LABS / IMAGING / EKG):  Orders Placed This Encounter   Procedures    XR KNEE LEFT (MIN 4 VIEWS)    XR WRIST LEFT (MIN 3 VIEWS)    Inpatient consult to Orthopedic Surgery MEDICATIONS ORDERED:  Orders Placed This Encounter   Medications    acetaminophen (TYLENOL) tablet 1,000 mg     DDX: Joint effusion, MCL injury, overuse injury, redislocation of left Colles' fracture    Initial MDM/Plan: 32 y.o. male who presents with pain in left knee approximately 2 weeks after fall from approximately 11 feet. No x-ray imaging was performed of the knee at that time. Will obtain x-rays of the knee as patient does have mild edema and tenderness to medial aspect of that knee. We will also repeat imaging of left wrist as patient with obvious deformity and previously splinted for Colles' fracture. Anticipate orthopedic consultation. DIAGNOSTIC RESULTS / EMERGENCY DEPARTMENT COURSE / MDM     LABS:  Labs Reviewed - No data to display    RADIOLOGY:  XR WRIST LEFT (MIN 3 VIEWS)    Result Date: 4/7/2022  EXAMINATION: For XRAY VIEWS OF THE LEFT WRIST 4/7/2022 5:11 am COMPARISON: Left wrist x-ray dated 03/25/2022 HISTORY: ORDERING SYSTEM PROVIDED HISTORY: known fracture, previously reduced, splint removed by patient TECHNOLOGIST PROVIDED HISTORY: known fracture, previously reduced, splint removed by patient Reason for Exam: pt fell 11ft 2 week ago hx of fx, pt removed splint FINDINGS: Acute displaced distal radial fracture with an associated ulnar styloid fracture. There is overlying soft tissue swelling. Rest of visualized osseous structures are unremarkable. No joint abnormality. Acute displaced Colles fracture with an associated ulnar styloid fracture. XR KNEE LEFT (MIN 4 VIEWS)    Result Date: 4/7/2022  EXAMINATION: FOUR XRAY VIEWS OF THE LEFT KNEE 4/7/2022 5:11 am COMPARISON: None. HISTORY: ORDERING SYSTEM PROVIDED HISTORY: knee pain, s/p fall from 11ft TECHNOLOGIST PROVIDED HISTORY: knee pain, s/p fall from 11ft Reason for Exam: fell 11ft 2 weeks ago pain to left knee FINDINGS: No acute fracture or dislocation. Small joint effusion. No focal osseous or soft tissue abnormality. No acute osseous abnormality. Small joint effusion. EMERGENCY DEPARTMENT COURSE:  ED Course as of 04/07/22 0824   Thu Apr 07, 2022   0614 Spoke with orthopedic surgery who recommended at this point given 2 weeks since date of injury surgical reduction and fixation. Ortho will speak with patient in regards to potential surgery today coming to Ortho clinic to be reevaluated and to schedule surgery in the next coming days. [CP]      ED Course User Index  [CP] Jacob Aguiar MD     Patient is a 17-year-old male with recent fall from approximately 11 feet sustaining a Colles' fracture and possible T5 superior endplate deformity. Colace fracture previously reduced and splinted by orthopedic surgery and recommended follow-up in 7 to 10 days for reevaluation. Patient now complaining of left knee pain. Previously no imaging obtained of the left knee. He does have slight edema noted to the medial aspect of the knee with some tenderness overlying the medial aspect as well. However ligamentous laxities are noted with anterior and posterior drawer testing nor with MCL and LCL stress testing. Patient without any erythema or warmth noted to the knee. X-ray imaging of the knee demonstrated a small joint effusion. At this time would recommend ice and elevation of the knee. Patient states he lost his splint as it became loose from removing and putting on T-shirts over the last couple days. He does have obvious deformity on evaluation but perfusion is intact and patient maintains ability to move fingers. Repeat imaging of the wrist does demonstrate displaced Colles' fracture. Orthopedic surgery was consulted as this was previously reduced. At this time they recommend resplinting the arm without reduction, follow-up in orthopedic surgery clinic today following discharge from the emergency department.   Patient is willing to follow-up in orthopedic clinic today after discharge and was discharged from the emergency department and was accompanied upstairs to the clinic by the orthopedic surgery resident. PROCEDURES:  None    CONSULTS:  IP CONSULT TO ORTHOPEDIC SURGERY    CRITICAL CARE:  Please see attending note    FINAL IMPRESSION      1. Closed Colles' fracture of left radius, initial encounter    2. Knee effusion, left         DISPOSITION / PLAN     DISPOSITION Decision To Discharge 04/07/2022 06:43:46 AM    Patient discharged home with immediate follow-up in orthopedic surgery clinic. PATIENTREFERRED TO:  No follow-up provider specified.     DISCHARGE MEDICATIONS:  Discharge Medication List as of 4/7/2022  6:45 AM          Dawn Thakur MD  Emergency Medicine Resident    (Please note that portions of this note were completed with a voice recognition program.  Efforts were made to edit the dictations but occasionally words are mis-transcribed.)       Velia Castro MD  Resident  04/07/22 7329

## 2022-04-07 NOTE — CONSULTS
Orthopedic Surgery Consult  (Dr. Eliceo Reynaga)    CC/Reason for consult:  Left wrist fracture     HPI:      The patient is a RHD 32 y.o. male with the above complaint being consulted for further evaluation. Of note, patient was last seen by us in the emergency department on 3/25/2022 where he sustained a left distal radius fracture after reportedly falling into the dumpster. Patient was placed into a splint and instructed to follow up with us the following week. Its noted that in the next several days patient ended up overdosing on heroin which required narcan administration. Patient ended up cancelling the appt and rescheduling to today on 4/7/2022. He is here in the ER because he reports his splint fell off several days ago through use. He also reports mild-moderate left knee pain which began the day of the injury. Denies numbness/tingling. Patient drank a glass of water at 430 AM. Otherwise- patient has no other orthopedic complaints at this time. Vitals were reviewed. Patient does not complain of any other orthopedic issues. Past Medical History:    Past Medical History:   Diagnosis Date    Anxiety     Depression     History of substance abuse ([facility] [address]        Past Surgical History:    Past Surgical History:   Procedure Laterality Date    HUMERUS FRACTURE SURGERY Right 09/22/2021    ORIF    HUMERUS FRACTURE SURGERY Right 9/22/2021    ORIF DISTAL HUMERUS performed by Erica Iverson MD at [facility] N/A 01/08/2020    APPENDECTOMY LAPAROSCOPIC performed by Carlos Medellin MD at [place] [id number]       Medications Prior to Admission:   Prior to Admission medications    Medication Sig Start Date End Date Taking? Authorizing Provider   gabapentin (NEURONTIN) 400 MG capsule Take 400 mg by mouth 3 times daily. Historical Provider, MD   buprenorphine-naloxone (SUBOXONE) 8-2 MG FILM SL film Place 1 Film under the tongue daily.     Historical Provider, MD Allergies:    Shellfish-derived products    Social History:   Social History     Socioeconomic History    Marital status: Single     Spouse name: Not on file    Number of children: Not on file    Years of education: Not on file    Highest education level: Not on file   Occupational History    Not on file   Tobacco Use    Smoking status: Current Every Day Smoker    Smokeless tobacco: Never Used   Vaping Use    Vaping Use: Former   Substance and Sexual Activity    Alcohol use: Not Currently     Comment: none since 2017    Drug use: Not Currently     Types: Opiates , IV, Marijuana (Francisco Buerger), Cocaine     Comment: last time 3 weeks ago crack and weed    Sexual activity: Not on file   Other Topics Concern    Not on file   Social History Narrative    Not on file     Social Determinants of Health     Financial Resource Strain:     Difficulty of Paying Living Expenses: Not on file   Food Insecurity:     Worried About Running Out of Food in the Last Year: Not on file    [place] of Food in the Last Year: Not on file   Transportation Needs:     Lack of Transportation (Medical): Not on file    Lack of Transportation (Non-Medical):  Not on file   Physical Activity:     Days of Exercise per Week: Not on file    Minutes of Exercise per Session: Not on file   Stress:     Feeling of Stress : Not on file   Social Connections:     Frequency of Communication with Friends and Family: Not on file    Frequency of Social Gatherings with Friends and Family: Not on file    Attends Protestant Services: Not on file    Active Member of Clubs or Organizations: Not on file    Attends Club or Organization Meetings: Not on file    Marital Status: Not on file   Intimate Partner Violence:     Fear of Current or Ex-Partner: Not on file    Emotionally Abused: Not on file    Physically Abused: Not on file    Sexually Abused: Not on file   Housing Stability:     Unable to Pay for Housing in the Last Year: Not on file    Number of Places Lived in the Last Year: Not on file    Unstable Housing in the Last Year: Not on file       Family History:  Family History   Problem Relation Age of Onset    Hypertension Other        REVIEW OF SYSTEMS:   Constitutional: Negative for fever and chills. Cardiovascular: Negative for chest pain and palpitations. Musculoskeletal: As described in the HPI. Skin: Negative for itching and rash. PHYSICAL EXAM:  Blood pressure (!) 158/92, pulse 109, resp. rate 18, height 5' 10\" (1.778 m), weight 230 lb (104.3 kg), SpO2 96 %. Gen: -Alert, cooperative      Chest: Non labored breathing. Cardiovascular: Regular rate, no dependent edema, distal pulses 2+    Respiratory: Chest symmetric, no accessory muscle use, normal respirations    LUE: Skin intact. Compartments soft. Skin able to be wrinkled. Ulnar/Median/AIN/PIN/Radial motor intact. Sensation intact to light touch to axillary/musc/radial/ulnar/median nerve distributions. Radial pulse 2+ with BRISK CAPILLARY REFILL    Left lower extremity: Mild tenderness to palpation about medial joint line. Mild pain with hyperflexion of left knee. Full active and passive ROM without pain, restriction, or crepitus. EHL/FHL/TA/GS motor intact. Sensation intact to light touch about sural, saph, S/D peroneal and plantar nerves. Dorsalis pedis 2+    LABS:  No results for input(s): WBC, HGB, HCT, PLT, INR, PTT, NA, K, BUN, CREATININE, GLUCOSE, SEDRATE, CRP in the last 72 hours. Invalid input(s): PT     Radiology:    XR of left wrist demonstrating distal radius fracture that has collapsed volarly compared to prior films. Impression 32 y.o. male being seen after consultation for the following problems:  1) Left distal radius fracture     Plan  - Unfortunately patient had to reschedule his clinic visit and his splint had been taken off thus his fracture has displaced completely which will likely require OR intervention at this point in time.    - Patient to be discharged and followed up in our clinic today at 7:30 AM.  - Weight bearing status: no pushing, pulling, or lifting to left upper extremity   - In situ splint applied to left wrist  - Ice (20 minutes on and off 1 hour) and elevate above the level of the heart  to reduce swelling and throbbing pain.   - Please page Ortho with any questions or concerns    Lesvia Rankin DO  Orthopedic Surgery Resident, PGY-3  [id number]

## 2022-04-08 ENCOUNTER — HOSPITAL ENCOUNTER (OUTPATIENT)
Dept: LAB | Age: 27
Setting detail: SPECIMEN
Discharge: HOME | End: 2022-04-08

## 2022-04-08 DIAGNOSIS — Z20.822 COVID-19 RULED OUT BY LABORATORY TESTING: Primary | ICD-10-CM

## 2022-04-11 NOTE — PROGRESS NOTES
Writer left message at office with [facility], surgery scheduler, informing her that patient did not have COVID screening test done on 4/8/22.

## 2022-04-12 ENCOUNTER — APPOINTMENT (OUTPATIENT)
Dept: GENERAL RADIOLOGY | Age: 27
End: 2022-04-12
Attending: ORTHOPAEDIC SURGERY
Payer: MEDICARE

## 2022-04-12 ENCOUNTER — ANESTHESIA (OUTPATIENT)
Dept: OPERATING ROOM | Age: 27
End: 2022-04-12
Payer: MEDICARE

## 2022-04-12 ENCOUNTER — HOSPITAL ENCOUNTER (OUTPATIENT)
Age: 27
Setting detail: OUTPATIENT SURGERY
Discharge: HOME | End: 2022-04-12
Attending: ORTHOPAEDIC SURGERY | Admitting: ORTHOPAEDIC SURGERY
Payer: MEDICARE

## 2022-04-12 ENCOUNTER — ANESTHESIA EVENT (OUTPATIENT)
Dept: OPERATING ROOM | Age: 27
End: 2022-04-12
Payer: MEDICARE

## 2022-04-12 VITALS
WEIGHT: 230 LBS | HEIGHT: 70 IN | SYSTOLIC BLOOD PRESSURE: 140 MMHG | DIASTOLIC BLOOD PRESSURE: 84 MMHG | TEMPERATURE: 96.8 F | HEART RATE: 68 BPM | RESPIRATION RATE: 17 BRPM | OXYGEN SATURATION: 100 % | BODY MASS INDEX: 32.93 KG/M2

## 2022-04-12 VITALS — OXYGEN SATURATION: 96 % | DIASTOLIC BLOOD PRESSURE: 81 MMHG | SYSTOLIC BLOOD PRESSURE: 148 MMHG | TEMPERATURE: 67.1 F

## 2022-04-12 LAB
SARS-COV-2, RAPID: NOT DETECTED
SPECIMEN DESCRIPTION: NORMAL

## 2022-04-12 PROCEDURE — 7100000001 HC PACU RECOVERY - ADDTL 15 MIN: Performed by: ORTHOPAEDIC SURGERY

## 2022-04-12 PROCEDURE — 3209999900 FLUORO FOR SURGICAL PROCEDURES

## 2022-04-12 PROCEDURE — 2709999900 HC NON-CHARGEABLE SUPPLY: Performed by: ORTHOPAEDIC SURGERY

## 2022-04-12 PROCEDURE — 7100000010 HC PHASE II RECOVERY - FIRST 15 MIN: Performed by: ORTHOPAEDIC SURGERY

## 2022-04-12 PROCEDURE — 3600000004 HC SURGERY LEVEL 4 BASE: Performed by: ORTHOPAEDIC SURGERY

## 2022-04-12 PROCEDURE — 25608 OPTX DST RD XART FX/EPI SEP2: CPT | Performed by: ORTHOPAEDIC SURGERY

## 2022-04-12 PROCEDURE — 2720000010 HC SURG SUPPLY STERILE: Performed by: ORTHOPAEDIC SURGERY

## 2022-04-12 PROCEDURE — 87635 SARS-COV-2 COVID-19 AMP PRB: CPT

## 2022-04-12 PROCEDURE — 2500000003 HC RX 250 WO HCPCS: Performed by: ANESTHESIOLOGY

## 2022-04-12 PROCEDURE — 2500000003 HC RX 250 WO HCPCS: Performed by: NURSE ANESTHETIST, CERTIFIED REGISTERED

## 2022-04-12 PROCEDURE — C1713 ANCHOR/SCREW BN/BN,TIS/BN: HCPCS | Performed by: ORTHOPAEDIC SURGERY

## 2022-04-12 PROCEDURE — 2580000003 HC RX 258: Performed by: ORTHOPAEDIC SURGERY

## 2022-04-12 PROCEDURE — 6360000002 HC RX W HCPCS: Performed by: NURSE ANESTHETIST, CERTIFIED REGISTERED

## 2022-04-12 PROCEDURE — 7100000000 HC PACU RECOVERY - FIRST 15 MIN: Performed by: ORTHOPAEDIC SURGERY

## 2022-04-12 PROCEDURE — 3600000014 HC SURGERY LEVEL 4 ADDTL 15MIN: Performed by: ORTHOPAEDIC SURGERY

## 2022-04-12 PROCEDURE — 6360000002 HC RX W HCPCS: Performed by: ANESTHESIOLOGY

## 2022-04-12 PROCEDURE — 3700000000 HC ANESTHESIA ATTENDED CARE: Performed by: ORTHOPAEDIC SURGERY

## 2022-04-12 PROCEDURE — 3700000001 HC ADD 15 MINUTES (ANESTHESIA): Performed by: ORTHOPAEDIC SURGERY

## 2022-04-12 PROCEDURE — 64415 NJX AA&/STRD BRCH PLXS IMG: CPT | Performed by: ANESTHESIOLOGY

## 2022-04-12 PROCEDURE — 2580000003 HC RX 258: Performed by: NURSE ANESTHETIST, CERTIFIED REGISTERED

## 2022-04-12 DEVICE — IMPLANTABLE DEVICE: Type: IMPLANTABLE DEVICE | Site: WRIST | Status: FUNCTIONAL

## 2022-04-12 RX ORDER — BUPIVACAINE HYDROCHLORIDE 5 MG/ML
INJECTION, SOLUTION EPIDURAL; INTRACAUDAL
Status: COMPLETED | OUTPATIENT
Start: 2022-04-12 | End: 2022-04-12

## 2022-04-12 RX ORDER — FENTANYL CITRATE 50 UG/ML
100 INJECTION, SOLUTION INTRAMUSCULAR; INTRAVENOUS ONCE
Status: COMPLETED | OUTPATIENT
Start: 2022-04-12 | End: 2022-04-12

## 2022-04-12 RX ORDER — ROCURONIUM BROMIDE 10 MG/ML
INJECTION, SOLUTION INTRAVENOUS PRN
Status: DISCONTINUED | OUTPATIENT
Start: 2022-04-12 | End: 2022-04-12 | Stop reason: SDUPTHER

## 2022-04-12 RX ORDER — IBUPROFEN 600 MG/1
TABLET ORAL
COMMUNITY
Start: 2022-02-24

## 2022-04-12 RX ORDER — DROPERIDOL 2.5 MG/ML
0.62 INJECTION, SOLUTION INTRAMUSCULAR; INTRAVENOUS
Status: DISCONTINUED | OUTPATIENT
Start: 2022-04-12 | End: 2022-04-12 | Stop reason: HOSPADM

## 2022-04-12 RX ORDER — KETAMINE HCL IN NACL, ISO-OSM 100MG/10ML
SYRINGE (ML) INJECTION PRN
Status: DISCONTINUED | OUTPATIENT
Start: 2022-04-12 | End: 2022-04-12 | Stop reason: SDUPTHER

## 2022-04-12 RX ORDER — SODIUM CHLORIDE 9 MG/ML
25 INJECTION, SOLUTION INTRAVENOUS PRN
Status: DISCONTINUED | OUTPATIENT
Start: 2022-04-12 | End: 2022-04-12 | Stop reason: HOSPADM

## 2022-04-12 RX ORDER — LIDOCAINE HYDROCHLORIDE 10 MG/ML
INJECTION, SOLUTION EPIDURAL; INFILTRATION; INTRACAUDAL; PERINEURAL PRN
Status: DISCONTINUED | OUTPATIENT
Start: 2022-04-12 | End: 2022-04-12 | Stop reason: SDUPTHER

## 2022-04-12 RX ORDER — DEXAMETHASONE SODIUM PHOSPHATE 10 MG/ML
INJECTION INTRAMUSCULAR; INTRAVENOUS PRN
Status: DISCONTINUED | OUTPATIENT
Start: 2022-04-12 | End: 2022-04-12 | Stop reason: SDUPTHER

## 2022-04-12 RX ORDER — METOCLOPRAMIDE HYDROCHLORIDE 5 MG/ML
10 INJECTION INTRAMUSCULAR; INTRAVENOUS
Status: DISCONTINUED | OUTPATIENT
Start: 2022-04-12 | End: 2022-04-12 | Stop reason: HOSPADM

## 2022-04-12 RX ORDER — HYDRALAZINE HYDROCHLORIDE 20 MG/ML
10 INJECTION INTRAMUSCULAR; INTRAVENOUS
Status: DISCONTINUED | OUTPATIENT
Start: 2022-04-12 | End: 2022-04-12 | Stop reason: HOSPADM

## 2022-04-12 RX ORDER — GLYCOPYRROLATE 1 MG/5 ML
SYRINGE (ML) INTRAVENOUS PRN
Status: DISCONTINUED | OUTPATIENT
Start: 2022-04-12 | End: 2022-04-12 | Stop reason: SDUPTHER

## 2022-04-12 RX ORDER — SODIUM CHLORIDE, SODIUM LACTATE, POTASSIUM CHLORIDE, CALCIUM CHLORIDE 600; 310; 30; 20 MG/100ML; MG/100ML; MG/100ML; MG/100ML
INJECTION, SOLUTION INTRAVENOUS CONTINUOUS PRN
Status: DISCONTINUED | OUTPATIENT
Start: 2022-04-12 | End: 2022-04-12 | Stop reason: SDUPTHER

## 2022-04-12 RX ORDER — SODIUM CHLORIDE 0.9 % (FLUSH) 0.9 %
5-40 SYRINGE (ML) INJECTION PRN
Status: DISCONTINUED | OUTPATIENT
Start: 2022-04-12 | End: 2022-04-12 | Stop reason: HOSPADM

## 2022-04-12 RX ORDER — MEPERIDINE HYDROCHLORIDE 50 MG/ML
12.5 INJECTION INTRAMUSCULAR; INTRAVENOUS; SUBCUTANEOUS EVERY 5 MIN PRN
Status: DISCONTINUED | OUTPATIENT
Start: 2022-04-12 | End: 2022-04-12 | Stop reason: HOSPADM

## 2022-04-12 RX ORDER — PROPOFOL 10 MG/ML
INJECTION, EMULSION INTRAVENOUS PRN
Status: DISCONTINUED | OUTPATIENT
Start: 2022-04-12 | End: 2022-04-12 | Stop reason: SDUPTHER

## 2022-04-12 RX ORDER — MAGNESIUM HYDROXIDE 1200 MG/15ML
LIQUID ORAL CONTINUOUS PRN
Status: DISCONTINUED | OUTPATIENT
Start: 2022-04-12 | End: 2022-04-12 | Stop reason: ALTCHOICE

## 2022-04-12 RX ORDER — MIDAZOLAM HYDROCHLORIDE 2 MG/2ML
2 INJECTION, SOLUTION INTRAMUSCULAR; INTRAVENOUS ONCE
Status: COMPLETED | OUTPATIENT
Start: 2022-04-12 | End: 2022-04-12

## 2022-04-12 RX ORDER — DIPHENHYDRAMINE HYDROCHLORIDE 50 MG/ML
12.5 INJECTION INTRAMUSCULAR; INTRAVENOUS
Status: DISCONTINUED | OUTPATIENT
Start: 2022-04-12 | End: 2022-04-12 | Stop reason: HOSPADM

## 2022-04-12 RX ORDER — SODIUM CHLORIDE 0.9 % (FLUSH) 0.9 %
5-40 SYRINGE (ML) INJECTION EVERY 12 HOURS SCHEDULED
Status: DISCONTINUED | OUTPATIENT
Start: 2022-04-12 | End: 2022-04-12 | Stop reason: HOSPADM

## 2022-04-12 RX ORDER — ONDANSETRON 2 MG/ML
INJECTION INTRAMUSCULAR; INTRAVENOUS PRN
Status: DISCONTINUED | OUTPATIENT
Start: 2022-04-12 | End: 2022-04-12 | Stop reason: SDUPTHER

## 2022-04-12 RX ORDER — NEOSTIGMINE METHYLSULFATE 5 MG/5 ML
SYRINGE (ML) INTRAVENOUS PRN
Status: DISCONTINUED | OUTPATIENT
Start: 2022-04-12 | End: 2022-04-12 | Stop reason: SDUPTHER

## 2022-04-12 RX ADMIN — MIDAZOLAM HYDROCHLORIDE 2 MG: 1 INJECTION, SOLUTION INTRAMUSCULAR; INTRAVENOUS at 12:54

## 2022-04-12 RX ADMIN — SODIUM CHLORIDE, POTASSIUM CHLORIDE, SODIUM LACTATE AND CALCIUM CHLORIDE: 600; 310; 30; 20 INJECTION, SOLUTION INTRAVENOUS at 12:00

## 2022-04-12 RX ADMIN — BUPIVACAINE HYDROCHLORIDE 20 ML: 5 INJECTION, SOLUTION EPIDURAL; INTRACAUDAL; PERINEURAL at 12:58

## 2022-04-12 RX ADMIN — Medication 0.4 MG: at 15:23

## 2022-04-12 RX ADMIN — Medication 2 MG: at 15:23

## 2022-04-12 RX ADMIN — PROPOFOL 200 MG: 10 INJECTION, EMULSION INTRAVENOUS at 13:56

## 2022-04-12 RX ADMIN — ROCURONIUM BROMIDE 50 MG: 10 INJECTION INTRAVENOUS at 13:56

## 2022-04-12 RX ADMIN — Medication 2 G: at 14:14

## 2022-04-12 RX ADMIN — PROPOFOL 100 MG: 10 INJECTION, EMULSION INTRAVENOUS at 14:00

## 2022-04-12 RX ADMIN — MEPERIDINE HYDROCHLORIDE 12.5 MG: 50 INJECTION, SOLUTION INTRAMUSCULAR; INTRAVENOUS; SUBCUTANEOUS at 15:40

## 2022-04-12 RX ADMIN — LIDOCAINE HYDROCHLORIDE 50 MG: 10 INJECTION, SOLUTION EPIDURAL; INFILTRATION; INTRACAUDAL; PERINEURAL at 13:56

## 2022-04-12 RX ADMIN — ONDANSETRON 4 MG: 2 INJECTION INTRAMUSCULAR; INTRAVENOUS at 15:05

## 2022-04-12 RX ADMIN — FENTANYL CITRATE 100 MCG: 50 INJECTION, SOLUTION INTRAMUSCULAR; INTRAVENOUS at 12:54

## 2022-04-12 RX ADMIN — Medication 30 MG: at 14:05

## 2022-04-12 RX ADMIN — SODIUM CHLORIDE, POTASSIUM CHLORIDE, SODIUM LACTATE AND CALCIUM CHLORIDE: 600; 310; 30; 20 INJECTION, SOLUTION INTRAVENOUS at 15:10

## 2022-04-12 RX ADMIN — DEXAMETHASONE SODIUM PHOSPHATE 10 MG: 10 INJECTION INTRAMUSCULAR; INTRAVENOUS at 14:05

## 2022-04-12 ASSESSMENT — PULMONARY FUNCTION TESTS
PIF_VALUE: 15
PIF_VALUE: 14
PIF_VALUE: 14
PIF_VALUE: 16
PIF_VALUE: 15
PIF_VALUE: 2
PIF_VALUE: 14
PIF_VALUE: 15
PIF_VALUE: 14
PIF_VALUE: 15
PIF_VALUE: 27
PIF_VALUE: 14
PIF_VALUE: 20
PIF_VALUE: 15
PIF_VALUE: 15
PIF_VALUE: 16
PIF_VALUE: 15
PIF_VALUE: 15
PIF_VALUE: 2
PIF_VALUE: 2
PIF_VALUE: 16
PIF_VALUE: 14
PIF_VALUE: 15
PIF_VALUE: 14
PIF_VALUE: 15
PIF_VALUE: 15
PIF_VALUE: 16
PIF_VALUE: 16
PIF_VALUE: 14
PIF_VALUE: 16
PIF_VALUE: 15
PIF_VALUE: 16
PIF_VALUE: 20
PIF_VALUE: 16
PIF_VALUE: 7
PIF_VALUE: 14
PIF_VALUE: 15
PIF_VALUE: 15
PIF_VALUE: 1
PIF_VALUE: 15
PIF_VALUE: 16
PIF_VALUE: 15
PIF_VALUE: 16
PIF_VALUE: 0
PIF_VALUE: 2
PIF_VALUE: 15
PIF_VALUE: 16
PIF_VALUE: 15
PIF_VALUE: 14
PIF_VALUE: 25
PIF_VALUE: 1
PIF_VALUE: 14
PIF_VALUE: 15
PIF_VALUE: 2
PIF_VALUE: 14
PIF_VALUE: 16
PIF_VALUE: 15
PIF_VALUE: 15
PIF_VALUE: 14
PIF_VALUE: 15
PIF_VALUE: 14
PIF_VALUE: 14
PIF_VALUE: 16
PIF_VALUE: 15
PIF_VALUE: 16
PIF_VALUE: 14
PIF_VALUE: 22
PIF_VALUE: 15
PIF_VALUE: 16
PIF_VALUE: 15
PIF_VALUE: 16
PIF_VALUE: 15
PIF_VALUE: 16
PIF_VALUE: 14
PIF_VALUE: 1
PIF_VALUE: 1
PIF_VALUE: 16
PIF_VALUE: 15
PIF_VALUE: 0
PIF_VALUE: 14
PIF_VALUE: 14
PIF_VALUE: 15
PIF_VALUE: 15
PIF_VALUE: 1
PIF_VALUE: 16
PIF_VALUE: 16
PIF_VALUE: 2
PIF_VALUE: 16
PIF_VALUE: 15
PIF_VALUE: 15
PIF_VALUE: 16
PIF_VALUE: 14
PIF_VALUE: 16
PIF_VALUE: 1

## 2022-04-12 ASSESSMENT — PAIN - FUNCTIONAL ASSESSMENT: PAIN_FUNCTIONAL_ASSESSMENT: 0-10

## 2022-04-12 ASSESSMENT — PAIN SCALES - GENERAL
PAINLEVEL_OUTOF10: 0
PAINLEVEL_OUTOF10: 0

## 2022-04-12 ASSESSMENT — PAIN DESCRIPTION - DESCRIPTORS: DESCRIPTORS: THROBBING;SHARP

## 2022-04-12 NOTE — ED PROVIDER NOTES
[facility]   Emergency Department  Faculty Attestation       I performed a history and physical examination of the patient and discussed management with the resident. I reviewed the residents note and agree with the documented findings including all diagnostic interpretations and plan of care. Any areas of disagreement are noted on the chart. I was personally present for the key portions of any procedures. I have documented in the chart those procedures where I was not present during the key portions. I have reviewed the emergency nurses triage note. I agree with the chief complaint, past medical history, past surgical history, allergies, medications, social and family history as documented unless otherwise noted below. Documentation of the HPI, Physical Exam and Medical Decision Making performed by scribkaren is based on my personal performance of the HPI, PE and MDM. For Physician Assistant/ Nurse Practitioner cases/documentation I have personally evaluated this patient and have completed at least one if not all key elements of the E/M (history, physical exam, and MDM). Additional findings are as noted. Pertinent Comments     Primary Care Physician: No primary care provider on file. ED Triage Vitals   BP Temp Temp Source Pulse Resp SpO2 Height Weight   04/07/22 0439 04/07/22 0645 04/07/22 0645 04/07/22 0439 04/07/22 0439 04/07/22 0439 04/07/22 0439 04/07/22 0439   (!) 158/92 97.2 °F (36.2 °C) Oral 109 18 96 % 5' 10\" (1.778 m) 230 lb (104.3 kg)        History/Physical:   This is a 32 y.o. male who presents to the Emergency Department with complaint of left knee pain and left wrist pain. Patient was involved in an accident where he fell from dumpster that is being picked up by the [employer] truck on 3/25/2022 and had a Colles' fracture at that time. States that the splint that had been on became wet and came off and now is having left-sided arm pain.   Now also been having some tenderness of the left knee. On exam, patient is alert and oriented in no acute distress. Does have ink pen stains on his right hand and walking around the room. Ambulating has any gait. Is of some mild tenderness over the left knee but normal range of motion. Normal sensation and strength distally. Left arm with obvious deformity. But does still have good radial pulses normal sensation, and cap refill less than 2 seconds. MDM/Plan:   Ambulating without difficulty, likely contusion to the left knee but will obtain imaging. Splint had become loose and patient took off of the left Colles' fracture that had previously been reduced, does appear to be out of place, but does have good distal pulses. Repeat imaging does show that it is displaced, Ortho consult  Given that this is now displaced, patient will likely need or management, Ortho would like to take patient straight up to Ortho clinic from the ED in order to be evaluated.   Okay for discharge        Critical Care: None     Kieran Ornelas MD  Attending Emergency Physician        Kieran Ornelas MD  04/12/22 1003

## 2022-04-12 NOTE — ANESTHESIA PRE PROCEDURE
Department of Anesthesiology  Preprocedure Note       Name:  Davin Bernstein   Age:  32 y.o.  :  1995                                          MRN:  [id number]         Date:  2022      Surgeon: Rebel Jung):  Levi Ramos DO    Procedure: Procedure(s):  DISTAL RADIUS OPEN REDUCTION INTERNAL FIXATION  (TRIMED, 3080 TABLE, SUPINE, C-ARM)    Medications prior to admission:   Prior to Admission medications    Medication Sig Start Date End Date Taking? Authorizing Provider   ibuprofen (ADVIL;MOTRIN) 600 MG tablet TAKE ONE TABLET BY MOUTH EVERY 6 HOURS AS NEEDED FOR MILD PAIN 22   Historical Provider, MD   gabapentin (NEURONTIN) 400 MG capsule Take 400 mg by mouth 3 times daily. Historical Provider, MD   buprenorphine-naloxone (SUBOXONE) 8-2 MG FILM SL film Place 1 Film under the tongue daily. Historical Provider, MD       Current medications:    No current facility-administered medications for this encounter. Allergies: Allergies   Allergen Reactions    Shellfish-Derived Products Swelling       Problem List:    Patient Active Problem List   Diagnosis Code    Fracture of left radius S52. 92XA    Fracture of left ulna S52.202A    Cocaine use F14.90    Marijuana use F12.90       Past Medical History:        Diagnosis Date    Anxiety     Depression     History of substance abuse ([facility] [address]        Past Surgical History:        Procedure Laterality Date    HUMERUS FRACTURE SURGERY Right 2021    ORIF DISTAL HUMERUS performed by Ivania Galvan MD at [facility] N/A 2020    APPENDECTOMY LAPAROSCOPIC performed by Zach Pete MD at [address] [place] [address] History:    Social History     Tobacco Use    Smoking status: Current Every Day Smoker     Packs/day: 0.25    Smokeless tobacco: Never Used   Substance Use Topics    Alcohol use: Not Currently     Comment: none since 2017                                Ready to quit: Not Answered  Counseling given: Not Answered      Vital Signs (Current):   Vitals:    04/12/22 1102   BP: (!) 148/82   Pulse: 60   Resp: 18   Temp: 97.3 °F (36.3 °C)   TempSrc: Temporal   SpO2: 100%   Weight: 230 lb (104.3 kg)   Height: 5' 10\" (1.778 m)                                              BP Readings from Last 3 Encounters:   04/12/22 (!) 148/82   04/07/22 (!) 158/92   03/28/22 120/67       NPO Status: Time of last liquid consumption: 2200                        Time of last solid consumption: 2200                        Date of last liquid consumption: 04/11/22                        Date of last solid food consumption: 04/11/22    BMI:   Wt Readings from Last 3 Encounters:   04/12/22 230 lb (104.3 kg)   04/07/22 230 lb (104.3 kg)   04/07/22 230 lb (104.3 kg)     Body mass index is 33 kg/m². CBC:   Lab Results   Component Value Date    WBC 6.9 03/25/2022    RBC 4.50 03/25/2022    HGB 13.1 03/25/2022    HCT 38.6 03/25/2022    MCV 85.8 03/25/2022    RDW 13.8 03/25/2022     03/25/2022       CMP:   Lab Results   Component Value Date     03/25/2022    K 3.7 03/25/2022    CL 98 03/25/2022    CO2 23 03/25/2022    BUN 16 03/25/2022    CREATININE 0.72 03/25/2022    GFRAA >60 03/25/2022    LABGLOM >60 03/25/2022    GLUCOSE 113 03/25/2022    PROT 6.2 03/25/2022    CALCIUM 8.9 03/25/2022    BILITOT 0.27 03/25/2022    ALKPHOS 76 03/25/2022    AST 50 03/25/2022    ALT 46 03/25/2022       POC Tests: No results for input(s): POCGLU, POCNA, POCK, POCCL, POCBUN, POCHEMO, POCHCT in the last 72 hours.     Coags:   Lab Results   Component Value Date    PROTIME 11.6 03/25/2022    INR 1.1 03/25/2022       HCG (If Applicable): No results found for: PREGTESTUR, PREGSERUM, HCG, HCGQUANT     ABGs: No results found for: PHART, PO2ART, OAK2TOH, PZO5UFF, BEART, P1YMFNUY     Type & Screen (If Applicable):  No results found for: LABABO, LABRH    Drug/Infectious Status (If Applicable):  Lab Results   Component Value Date    HEPCAB REACTIVE 07/27/2019       COVID-19 Screening (If Applicable):   Lab Results   Component Value Date    COVID19 Not Detected 04/12/2022           Anesthesia Evaluation  Patient summary reviewed and Nursing notes reviewed no history of anesthetic complications:   Airway: Mallampati: II  TM distance: >3 FB   Neck ROM: full  Mouth opening: > = 3 FB Dental: normal exam         Pulmonary:Negative Pulmonary ROS and normal exam                               Cardiovascular:Negative CV ROS                      Neuro/Psych:   Negative Neuro/Psych ROS              GI/Hepatic/Renal: Neg GI/Hepatic/Renal ROS            Endo/Other:                      ROS comment: History of substance abuse Abdominal:             Vascular: Other Findings:           Anesthesia Plan      general and regional     ASA 2       Induction: intravenous. MIPS: Postoperative opioids intended and Prophylactic antiemetics administered. Anesthetic plan and risks discussed with patient. Plan discussed with CRNA.                   Rachell Muñiz MD   4/12/2022

## 2022-04-12 NOTE — ANESTHESIA POSTPROCEDURE EVALUATION
POST- ANESTHESIA EVALUATION       Pt Name: Juana Reid  MRN: [id number]  YOB: 1995  Date of evaluation: 4/12/2022  Time:  4:24 PM      BP (!) 140/84   Pulse 68   Temp 96.8 °F (36 °C)   Resp 17   Ht 5' 10\" (1.778 m)   Wt 230 lb (104.3 kg)   SpO2 100%   BMI 33.00 kg/m²      Consciousness Level  Awake  Cardiopulmonary Status  Stable  Pain Adequately Treated YES  Nausea / Vomiting  NO  Adequate Hydration  YES  Anesthesia Related Complications NONE      Electronically signed by Edison Avila MD on 4/12/2022 at 4:24 PM       Department of Anesthesiology  Postprocedure Note    Patient: Juana Reid  MRN: [id number]  [place]: 1995  Date of evaluation: 4/12/2022  Time:  4:24 PM     Procedure Summary     Date: 04/12/22 Room / Location: [address]    Anesthesia Start: 4734 Anesthesia Stop: 2782    Procedure: DISTAL RADIUS OPEN REDUCTION INTERNAL FIXATION  TRIMED, C-ARM, APPLICATION OF SPLINT (Left ) Diagnosis: (LEFT DISTAL RADIUS FRACTURE)    Surgeons: Rebecca Bourgeois DO Responsible Provider: Edison Avila MD    Anesthesia Type: general, regional ASA Status: 2          Anesthesia Type: general, regional    Shaan Phase I: Shaan Score: 10    Shaan Phase II: Shaan Score: 10    Last vitals: Reviewed and per EMR flowsheets.        Anesthesia Post Evaluation

## 2022-04-12 NOTE — PROGRESS NOTES
Discharge Note:      All discharge instructions given at this time as well as all patient belongings returned to patient. Pt denies any further questions regarding discharge at this time. Pt given also given discharge packet with unit letter, discharge instructions/restrictions and medication handouts regarding all discharge medications and side effects. Pt denies any further issues at this time. Pt wheeled out to front discharge doors at this time. Pt left premises without any issues in private vehicle at this time.

## 2022-04-12 NOTE — ANESTHESIA PROCEDURE NOTES
Peripheral Block    Patient location during procedure: pre-op  Start time: 4/12/2022 12:54 PM  End time: 4/12/2022 12:57 PM  Staffing  Performed: anesthesiologist   Anesthesiologist: Shantell Martin MD  Preanesthetic Checklist  Completed: patient identified, IV checked, site marked, risks and benefits discussed, surgical consent, monitors and equipment checked, pre-op evaluation, timeout performed, anesthesia consent given, oxygen available and patient being monitored  Peripheral Block  Patient position: supine  Prep: ChloraPrep  Patient monitoring: cardiac monitor, continuous pulse ox and IV access  Block type: Brachial plexus  Laterality: left  Injection technique: single-shot  Guidance: ultrasound guided  Infraclavicular  Provider prep: mask and sterile gloves  Needle  Needle type: short-bevel   Needle gauge: 20 G  Needle length: 10 cm  Needle localization: ultrasound guidance  Assessment  Injection assessment: negative aspiration for heme, no paresthesia on injection and local visualized surrounding nerve on ultrasound  Slow fractionated injection: yes  Hemodynamics: stable  Medications Administered  Bupivacaine (MARCAINE) PF injection 0.5%, 20 mL  Reason for block: post-op pain management and at surgeon's request

## 2022-04-12 NOTE — BRIEF OP NOTE
Brief Postoperative Note      Patient: Dyllan Lennon  YOB: 1995  MRN: [id number]    Date of Procedure: 4/12/2022    Pre-Op Diagnosis: Left distal radius fracture    Post-Op Diagnosis: Left distal radius fracture       Procedure(s):  1. Open reduction internal fixation left distal radius    Surgeon(s):  Ajay Urias DO    Assistant:  Resident: Shine Coleman DO    Anesthesia: General    Estimated Blood Loss (mL): 2    Fluids: 1L crystalloids    TT: 09CRY    Complications: None    Specimens:   * No specimens in log *    Implants:  Implant Name Type Inv. Item Serial No.  Lot No. LRB No. Used Action   PLATE BONE 5 H 5 PEG W LT Yosef Macarena BEAR - [id number]  PLATE BONE 5 H 5 PEG W LT VOLAR S STL BEAR  [employer]-WD  Left 1 Implanted   PEG VOLAR THRD 20MM - [id number] Screw/Plate/Nail/Parth PEG VOLAR THRD 20MM  TRI MED-PMM  Left 2 Implanted   PEG BNE FIX [id number] VOLAR WRST THRD PROSTHESES FOR BEAR PLT - [id number]  PEG BNE FIX [id number] VOLAR WRST THRD PROSTHESES FOR BEAR PLT  [employer]-WD  Left 3 Implanted   SCREW BNE L13MM [id number] ULN ANK S STL ROLAND THRD HEX RECESS - [id number]  SCREW BNE L13MM [id number] ULN ANK S STL ROLAND THRD HEX RECESS  [employer]WD  Left 1 Implanted   SCREW BNE L16MM DIA3. 2MM ROLAND EL FA NONLOCKING - [id number]  SCREW BNE L16MM DIA3. 2MM ROLAND EL FA NONLOCKING  [employer]WD  Left 1 Implanted   SCREW BNE L18MM DIA3. 2MM ULN ANK S STL ROLAND THRD HEX RECESS - [id number]  SCREW BNE L18MM DIA3. 2MM ULN ANK S STL ROLAND THRD HEX RECESS  [employer]WD  Left 1 Implanted         Drains: * No LDAs found *    Findings: Left distal radius fracture. See op note for details.     Electronically signed by Shine Coleman DO on 4/12/2022 at 3:34 PM

## 2022-04-12 NOTE — OP NOTE
Operative Note      Patient: Kecia Arciniega  YOB: 1995  MRN: [id number]     Date of Procedure: 4/12/2022     Pre-Op Diagnosis: Left distal radius fracture     Post-Op Diagnosis: Left distal radius fracture       Procedure(s):  1. Open reduction internal fixation left distal radius     Surgeon(s):  Jinny Thurston DO     Assistant:  Resident: Khurram Vicente DO     Anesthesia: General     Estimated Blood Loss (mL): 2     Fluids: 1L crystalloids     TT: 32CPY     Complications: None     Specimens:   * No specimens in log *     Implants:  Implant Name Type Inv. Item Serial No.  Lot No. LRB No. Used Action   PLATE BONE 5 H 5 PEG W LT Gunter Baize BEAR - [id number]   PLATE BONE 5 H 5 PEG W LT VOLAR S STL BEAR   [employer]WD   Left 1 Implanted   PEG VOLAR THRD 20MM - [id number] Screw/Plate/Nail/Parth PEG VOLAR THRD 20MM   TRI MED-PMM   Left 2 Implanted   PEG BNE FIX [id number] VOLAR WRST THRD PROSTHESES FOR BEAR PLT - [id number]   PEG BNE FIX [id number] VOLAR WRST THRD PROSTHESES FOR BEAR PLT   [employer]-WD   Left 3 Implanted   SCREW BNE L13MM [id number] ULN ANK S STL ROLAND THRD HEX RECESS - [id number]   SCREW BNE L13MM [id number] ULN ANK S STL ROLAND THRD HEX RECESS   [employer]WD   Left 1 Implanted   SCREW BNE L16MM DIA3. 2MM ROLAND EL FA NONLOCKING - [id number]   SCREW BNE L16MM DIA3. 2MM ROLAND EL FA NONLOCKING   [employer]WD   Left 1 Implanted   SCREW BNE L18MM DIA3. 2MM ULN ANK S STL ROLAND THRD HEX RECESS - [id number]   SCREW BNE L18MM DIA3. 2MM ULN ANK S STL ROLAND THRD HEX RECESS   [employer]-WD   Left 1 Implanted          Drains: * No LDAs found *     Findings: Left distal radius fracture. Indications for Procedure:  Marcial Cui is a 73-year-old right-hand-dominant male who sustained an injury to his left distal radius after falling out of a dumpster on 4/7/2022. He presented to our ED where he underwent closed reduction with application of a sugar tong splint.   Patient has a known history of drug abuse including heroin as well as noncompliance as he was noted to not be wearing his splint. When he was seen again in our office his left distal radius fracture was noted to have redisplaced. All treatment options including conservative versus surgical were discussed with the patient in detail. All questions answered appropriately. Surgical risks including but not limited to: bleeding, blood clots, wound complications, infection, damage to surrounding tissues/nerves/vessels, malunion, nonunion, need for further surgery, loss of motion, stiffness, residual pain, risks of anesthesia, loss of limb and loss of life were all discussed with the patient. Knowing these risks, patient wishes to proceed with surgery. Detailed Description of Procedure: The patient was met in the preoperative holding area all final questions were answered regarding his plan of care. His H&P was reviewed to ensure accuracy. His operative consent form was reviewed also to ensure accuracy. His left upper extremity is marked for surgery. The patient was then wheeled to the operating room and laid supine on a 30/80 operating room table with a hand board extension. General anesthesia was induced by the anesthesia team without difficulty. Patient did receive 2 g of IV Ancef for perioperative prophylaxis. A tourniquet was placed proximally to left upper extremity was inflated to 250 mmHg for 60 minutes of the procedure. The left upper extremity was then prepped and draped in a sterile fashion. A timeout was then performed were all members in the room were in agreement of the patient, procedure, and the correct operative extremity. We then performed a FCR approach to the distal radius. We incised the skin with a 15 blade scalpel utilizing bipolar electrocautery for hemostasis. We carefully dissected down the level of the distal radius taking care to avoid any neurovascular structures.   Retractors were used to keep soft tissue out of our field of view as we were able to directly visualize the distal radius. The distal radius fracture was visualized which was noted to have callus formation. Callus was debrided. We were then able to see directly the fracture site. We were able to manually reduce the fracture. This was provisionally fixed with a K wire through the radial styloid. C-arm fluoroscopy was utilized to ensure a near anatomic reduction. We then inserted a 5 hole trimed volar locking distal radius plate. We placed locking screws distally directly under fluoroscopy to ensure we did not penetrate the joint. We then utilized a push pull method to further gain more distal radius height. We then inserted 3 cortical screws proximally in the plate. After plate application final C arm fluoroscopy images were taken which demonstrated near-anatomic alignment of her distal radius fracture. We manually stressed the DRUJ which was stable with a firm endpoint. The wound was then copiously irrigated with normal saline. It was closed in a layered fashion with 2-0 Vicryl suture and superficially with 3-0 Monocryl suture and Dermabond over the skin. A well-padded volar slab splint was then applied. The patient tolerated the procedure well. He was awoken from anesthesia without difficulty. All counts were final correct at end the procedure. The patient was wheeled to the postanesthesia care unit in stable condition. Dr. Mayra Cid was present and active during the entirety of the procedure.     Electronically signed by Bebeto Liang DO on 4/12/2022 at 3:39 PM

## 2022-04-12 NOTE — H&P
History and Physical    Pt Name: Martha Moss  MRN: [id number]  YOB: 1995  Date of evaluation: 4/12/2022    SUBJECTIVE:   History of Chief Complaint:    Patient presents preprocedure for ORIF distal radius fracture. He had a fall injury 3/25, fell onto outstretched hand. He says that he know immediately he had a fracture. He was seen and has been in a splint since then. He says that the fracture is displaced, is scheduled for ORIF distal radius. Past Medical History    has a past medical history of Anxiety, Depression, and History of substance abuse ([facility] [address] Past Surgical History   has a past surgical history that includes knee surgery; laparoscopic appendectomy (N/A, 01/08/2020); and Humerus fracture surgery (Right, 09/22/2021). Medications  Prior to Admission medications    Medication Sig Start Date End Date Taking? Authorizing Provider   ibuprofen (ADVIL;MOTRIN) 600 MG tablet TAKE ONE TABLET BY MOUTH EVERY 6 HOURS AS NEEDED FOR MILD PAIN 2/24/22   Historical Provider, MD   gabapentin (NEURONTIN) 400 MG capsule Take 400 mg by mouth 3 times daily. Historical Provider, MD   buprenorphine-naloxone (SUBOXONE) 8-2 MG FILM SL film Place 1 Film under the tongue daily. Historical Provider, MD     Allergies  is allergic to shellfish-derived products. Family History  family history includes Hypertension in an other family member. Social History   reports that he has been smoking. He has been smoking about 0.25 packs per day. He has never used smokeless tobacco.   reports previous alcohol use. reports previous drug use. Drugs: Opiates , IV, Marijuana ([facility]lelee Crome), and Cocaine.   Marital Status single  Occupation works for [employer]    Review of Systems:  CONSTITUTIONAL:   negative for fevers, chills, fatigue and malaise    EYES:   negative for double vision, blurred vision and photophobia    HEENT:   negative for tinnitus, epistaxis and sore throat     RESPIRATORY:   negative for cough, shortness of breath, wheezing     CARDIOVASCULAR:   negative for chest pain, palpitations, syncope, edema     GASTROINTESTINAL:   negative for nausea, vomiting     GENITOURINARY:   negative for incontinence     MUSCULOSKELETAL:   See HPI   NEUROLOGICAL:   Negative for weakness and tingling  negative for headaches and dizziness     PSYCHIATRIC:   negative for anxiety         OBJECTIVE:   VITALS:  height is 5' 10\" (1.778 m) and weight is 230 lb (104.3 kg). His temporal temperature is 97.3 °F (36.3 °C). His blood pressure is 148/82 (abnormal) and his pulse is 60. His respiration is 18 and oxygen saturation is 100%. CONSTITUTIONAL:alert & cooperative, no acute distress. Very pleasant, present with mom. SKIN:  Warm and dry, no rashes on exposed areas of skin   HEAD:  Normocephalic, atraumatic   EYES: PERRL. EOMs intact. EARS:  Hearing grossly WNL. NOSE:  Nares patent. No rhinorrhea   MOUTH/THROAT:  benign  NECK:good ROM   LUNGS: Clear to auscultation bilaterally, no wheezes. CARDIOVASCULAR: Heart sounds are normal.  Regular rate and rhythm without murmur. ABDOMEN: soft, non tender, non distended. EXTREMITIES: no edema bilateral lower extremities. LUE fracture. IMPRESSIONS:   Radius fracture   has a past medical history of Anxiety, Depression, and History of substance abuse ([facility] [address]    PLANS:   Distal radius ORIF    Monae Gibson PA-C  Electronically signed 4/12/2022 at 11:15 AM

## 2022-04-28 ENCOUNTER — OFFICE VISIT (OUTPATIENT)
Dept: ORTHOPEDIC SURGERY | Age: 27
End: 2022-04-28

## 2022-04-28 VITALS — BODY MASS INDEX: 32.93 KG/M2 | HEIGHT: 70 IN | WEIGHT: 230 LBS

## 2022-04-28 DIAGNOSIS — S52.502D CLOSED FRACTURE OF DISTAL END OF LEFT RADIUS WITH ROUTINE HEALING, UNSPECIFIED FRACTURE MORPHOLOGY, SUBSEQUENT ENCOUNTER: Primary | ICD-10-CM

## 2022-04-28 PROCEDURE — 99024 POSTOP FOLLOW-UP VISIT: CPT | Performed by: STUDENT IN AN ORGANIZED HEALTH CARE EDUCATION/TRAINING PROGRAM

## 2022-04-28 NOTE — PROGRESS NOTES
[address]  [address] [facility] [address]  Dept: [phone]  Dept Fax: [phone]        Ambulatory Follow Up    Subjective:   Zenda Lanes is a 32y.o. year old male who presents to our office today for routine followup regarding his   1. Closed fracture of distal end of left radius with routine healing, unspecified fracture morphology, subsequent encounter    . Chief Complaint   Patient presents with    Follow-up     04/12/2022 ORIF LEFT DISTAL RADIUS        HPI  Helen Ryan is a 38-year-old male who presents for postop follow-up following left distal radius open reduction internal fixation date of surgery 4/12/2022. Patient is roughly 2 weeks out from surgery today. He did leave the postop area before obtain postop x-rays. He has maintained his splint since surgery. Pain is controlled. He has no numbness or tingling in the fingers. Denies any new falls or injuries. Denies any fevers chills nausea vomiting. Review of Systems  Negative otherwise mentioned above. I have reviewed the CC, HPI, ROS, PMH, FHX, Social History. I agree with the documentation provided by other staff, residents, and/or medical students and have reviewed their documentation prior to providing my signature indicating agreement. Objective :   General: Zenda Lanes is a 32 y.o. male who is alert and oriented and sitting comfortably in our office. Ortho Exam  Left upper extremity: Volar skin incision is healing appropriately without any signs of infection. There is no drainage. Appropriate postop tenderness to palpation. Median/AIN/ulnar/radial/PIN motor intact to hand. Sensory exam intact without any deficits. 2+ radial pulse    Neuro: alert. oriented  Eyes: Extra-ocular muscles intact  Mouth: Oral mucosa moist. No perioral lesions  Pulm: Respirations unlabored and regular.   Skin: warm, well perfused  Psych:   Patient has good fund of knowledge and displays understanging of exam, diagnosis, and plan. Radiology:   History:   Left distal radius fracture status post ORIF    Comparison: Intraoperative fluoroscopy on 4/13/2022    Findings:   3 views of the left wrist demonstrate a comminuted distal radius fracture status post ORIF when comparing to prior intraoperative fluoroscopy there is maintained reduction that has not changed since surgery. No evidence of hardware complication. No interval healing at this time    Impression:  Left distal radius fracture status post ORIF with no hardware complication and maintained reduction      Assessment:      1. Closed fracture of distal end of left radius with routine healing, unspecified fracture morphology, subsequent encounter       Plan:    Montrell Arellano is a 18-year-old male status post ORIF of left distal radius fracture. We reviewed x-rays today in clinic which demonstrate appropriate maintenance of reduction. Incision is healing appropriately. He will go into a removable wrist brace and we will see him back for follow-up in 4 weeks with repeat wrist x-rays. We discussed that he is still to remain strict none weightbearing to the left upper extremity. There is okay if he does gentle wrist range of motion exercises. Follow up:Return in about 4 weeks (around 5/26/2022) for Left wrist XR. No orders of the defined types were placed in this encounter.          Orders Placed This Encounter   Procedures    XR WRIST LEFT (MIN 3 VIEWS)     Standing Status:   Future     Number of Occurrences:   1     Standing Expiration Date:   4/28/2023       Electronically signed by Erik Mosley DO   Orthopedic Surgery Resident PGY-4  [address] [facility]  4/28/2022 at 10:28 AM

## 2022-05-25 DIAGNOSIS — S52.502D CLOSED FRACTURE OF DISTAL END OF LEFT RADIUS WITH ROUTINE HEALING, UNSPECIFIED FRACTURE MORPHOLOGY, SUBSEQUENT ENCOUNTER: Primary | ICD-10-CM

## 2022-05-26 ENCOUNTER — TELEPHONE (OUTPATIENT)
Dept: ORTHOPEDIC SURGERY | Age: 27
End: 2022-05-26

## 2022-09-07 ENCOUNTER — APPOINTMENT (OUTPATIENT)
Dept: GENERAL RADIOLOGY | Age: 27
End: 2022-09-07
Payer: MEDICARE

## 2022-09-07 ENCOUNTER — HOSPITAL ENCOUNTER (EMERGENCY)
Age: 27
Discharge: HOME | End: 2022-09-07
Attending: EMERGENCY MEDICINE
Payer: MEDICARE

## 2022-09-07 VITALS
RESPIRATION RATE: 17 BRPM | TEMPERATURE: 98.4 F | HEIGHT: 70 IN | DIASTOLIC BLOOD PRESSURE: 71 MMHG | BODY MASS INDEX: 32.93 KG/M2 | WEIGHT: 230 LBS | OXYGEN SATURATION: 97 % | HEART RATE: 76 BPM | SYSTOLIC BLOOD PRESSURE: 130 MMHG

## 2022-09-07 DIAGNOSIS — T40.2X1A OPIOID OVERDOSE, ACCIDENTAL OR UNINTENTIONAL, INITIAL ENCOUNTER (HCC): Primary | ICD-10-CM

## 2022-09-07 LAB
ABSOLUTE EOS #: 0.2 K/UL (ref 0–0.4)
ABSOLUTE LYMPH #: 0.9 K/UL (ref 1–4.8)
ABSOLUTE MONO #: 0.5 K/UL (ref 0.1–1.3)
AMPHETAMINE SCREEN URINE: NEGATIVE
ANION GAP SERPL CALCULATED.3IONS-SCNC: 9 MMOL/L (ref 9–17)
BACTERIA: ABNORMAL
BARBITURATE SCREEN URINE: NEGATIVE
BASOPHILS # BLD: 1 % (ref 0–2)
BASOPHILS ABSOLUTE: 0 K/UL (ref 0–0.2)
BENZODIAZEPINE SCREEN, URINE: NEGATIVE
BILIRUBIN URINE: NEGATIVE
BUN BLDV-MCNC: 16 MG/DL (ref 6–20)
CALCIUM SERPL-MCNC: 9.4 MG/DL (ref 8.6–10.4)
CANNABINOID SCREEN URINE: POSITIVE
CASTS UA: ABNORMAL /LPF
CASTS UA: ABNORMAL /LPF
CHLORIDE BLD-SCNC: 105 MMOL/L (ref 98–107)
CO2: 24 MMOL/L (ref 20–31)
COCAINE METABOLITE, URINE: NEGATIVE
COLOR: YELLOW
CREAT SERPL-MCNC: 0.88 MG/DL (ref 0.7–1.2)
EOSINOPHILS RELATIVE PERCENT: 2 % (ref 0–4)
EPITHELIAL CELLS UA: ABNORMAL /HPF
FENTANYL URINE: POSITIVE
GFR AFRICAN AMERICAN: >60 ML/MIN
GFR NON-AFRICAN AMERICAN: >60 ML/MIN
GFR SERPL CREATININE-BSD FRML MDRD: ABNORMAL ML/MIN/{1.73_M2}
GLUCOSE BLD-MCNC: 119 MG/DL (ref 70–99)
GLUCOSE URINE: NEGATIVE
HCT VFR BLD CALC: 39.2 % (ref 41–53)
HEMOGLOBIN: 13.4 G/DL (ref 13.5–17.5)
KETONES, URINE: NEGATIVE
LEUKOCYTE ESTERASE, URINE: NEGATIVE
LYMPHOCYTES # BLD: 12 % (ref 24–44)
MCH RBC QN AUTO: 29.5 PG (ref 26–34)
MCHC RBC AUTO-ENTMCNC: 34.1 G/DL (ref 31–37)
MCV RBC AUTO: 86.6 FL (ref 80–100)
METHADONE SCREEN, URINE: NEGATIVE
MONOCYTES # BLD: 6 % (ref 1–7)
MUCUS: ABNORMAL
NITRITE, URINE: NEGATIVE
OPIATES, URINE: NEGATIVE
OXYCODONE SCREEN URINE: NEGATIVE
PDW BLD-RTO: 14 % (ref 11.5–14.9)
PH UA: 5.5 (ref 5–8)
PHENCYCLIDINE, URINE: NEGATIVE
PLATELET # BLD: 162 K/UL (ref 150–450)
PMV BLD AUTO: 8.5 FL (ref 6–12)
POTASSIUM SERPL-SCNC: 4 MMOL/L (ref 3.7–5.3)
PROTEIN UA: ABNORMAL
RBC # BLD: 4.53 M/UL (ref 4.5–5.9)
RBC UA: ABNORMAL /HPF
SEG NEUTROPHILS: 79 % (ref 36–66)
SEGMENTED NEUTROPHILS ABSOLUTE COUNT: 5.9 K/UL (ref 1.3–9.1)
SODIUM BLD-SCNC: 138 MMOL/L (ref 135–144)
SPECIFIC GRAVITY UA: 1.03 (ref 1–1.03)
TEST INFORMATION: ABNORMAL
TROPONIN, HIGH SENSITIVITY: 17 NG/L (ref 0–22)
TURBIDITY: CLEAR
URINE HGB: ABNORMAL
UROBILINOGEN, URINE: NORMAL
WBC # BLD: 7.5 K/UL (ref 3.5–11)
WBC UA: ABNORMAL /HPF

## 2022-09-07 PROCEDURE — 85025 COMPLETE CBC W/AUTO DIFF WBC: CPT

## 2022-09-07 PROCEDURE — 80048 BASIC METABOLIC PNL TOTAL CA: CPT

## 2022-09-07 PROCEDURE — 36415 COLL VENOUS BLD VENIPUNCTURE: CPT

## 2022-09-07 PROCEDURE — 99285 EMERGENCY DEPT VISIT HI MDM: CPT

## 2022-09-07 PROCEDURE — 71045 X-RAY EXAM CHEST 1 VIEW: CPT

## 2022-09-07 PROCEDURE — 81001 URINALYSIS AUTO W/SCOPE: CPT

## 2022-09-07 PROCEDURE — 80307 DRUG TEST PRSMV CHEM ANLYZR: CPT

## 2022-09-07 PROCEDURE — 87086 URINE CULTURE/COLONY COUNT: CPT

## 2022-09-07 PROCEDURE — 2580000003 HC RX 258: Performed by: STUDENT IN AN ORGANIZED HEALTH CARE EDUCATION/TRAINING PROGRAM

## 2022-09-07 PROCEDURE — 93005 ELECTROCARDIOGRAM TRACING: CPT | Performed by: STUDENT IN AN ORGANIZED HEALTH CARE EDUCATION/TRAINING PROGRAM

## 2022-09-07 PROCEDURE — 84484 ASSAY OF TROPONIN QUANT: CPT

## 2022-09-07 RX ORDER — 0.9 % SODIUM CHLORIDE 0.9 %
1000 INTRAVENOUS SOLUTION INTRAVENOUS ONCE
Status: COMPLETED | OUTPATIENT
Start: 2022-09-07 | End: 2022-09-07

## 2022-09-07 RX ADMIN — SODIUM CHLORIDE 1000 ML: 9 INJECTION, SOLUTION INTRAVENOUS at 17:39

## 2022-09-07 ASSESSMENT — PAIN - FUNCTIONAL ASSESSMENT
PAIN_FUNCTIONAL_ASSESSMENT: NONE - DENIES PAIN
PAIN_FUNCTIONAL_ASSESSMENT: NONE - DENIES PAIN

## 2022-09-07 ASSESSMENT — LIFESTYLE VARIABLES: HOW OFTEN DO YOU HAVE A DRINK CONTAINING ALCOHOL: NEVER

## 2022-09-07 NOTE — ED PROVIDER NOTES
[address]  eMERGENCY dEPARTMENT eNCOUnter   Attending Attestation     Pt Name: Holley Daniels  MRN: [id number]  [place] 1995  Date of evaluation: 9/7/22       Holley Daniels is a 32 y.o. male who presents with Loss of Consciousness and Other (Ingestion of unknown medication)      History:   15-year-old male presents the ER after possible overdose. Patient asked for a pill for pain but shortly after taking it the patient passed out and was found by EMS not breathing. Patient was provided with Narcan by EMS and did arouse. Patient does have a history of opiate abuse. Exam: Vitals:   Vitals:    09/07/22 1721   BP: (!) 151/97   Pulse: 97   Resp: 22   Temp: 98.3 °F (36.8 °C)   TempSrc: Oral   SpO2: 97%   Weight: 230 lb (104.3 kg)   Height: 5' 10\" (1.778 m)     15-year-old male was observed in the ER. Urine drug screen did show positive fentanyl. Patient did not show signs of deteriorating mental state while with us in the ER. Patient was instructed to follow-up with primary care physician within 2 days and to return to the ER immediately if symptoms worsen or change. Patient understands and agrees with the plan. I performed a history and physical examination of the patient and discussed management with the resident. I reviewed the residents note and agree with the documented findings and plan of care. Any areas of disagreement are noted on the chart. I was personally present for the key portions of any procedures. I have documented in the chart those procedures where I was not present during the key portions. I have personally reviewed all images and agree with the resident's interpretation. I have reviewed the emergency nurses triage note. I agree with the chief complaint, past medical history, past surgical history, allergies, medications, social and family history as documented unless otherwise noted below.  Documentation of the HPI, Physical Exam and Medical Decision Making performed by medical students or scribes is based on my personal performance of the HPI, PE and MDM. I personally evaluated and examined the patient in conjunction with the APC and agree with the assessment, treatment plan, and disposition of the patient as recorded by the APC. Additional findings are as noted.     Eleno Sweeney DO  Attending Emergency  Physician              Eleno Sweeney DO  09/07/22 2037

## 2022-09-07 NOTE — ED NOTES
Verbal report given to [facility] RN on Candida Kelly   Report consisted of patient's Situation, Background, Assessment and   Recommendations(SBAR). Information from the following report(s) ED Encounter Summary was reviewed with the receiving nurse. Lines:   Peripheral IV 09/07/22 Left; Anterior Forearm (Active)        Opportunity for questions and clarification was provided. Doctor's re-evaluation.          Vanessa Rowland RN  34/89/78 3859

## 2022-09-07 NOTE — ED PROVIDER NOTES
[address]  Emergency Department Encounter  EmergencyMedicine Resident     Pt Name:Suraj Morgan  MRN: [id number]  [place] 1995  Date of evaluation: 9/7/22  PCP:  No primary care provider on file. This patient was evaluated in the Emergency Department for symptoms described in the history of present illness. The patient was evaluated in the context of the global COVID-19 pandemic, which necessitated consideration that the patient might be at risk for infection with the SARS-CoV-2 virus that causes COVID-19. Institutional protocols and algorithms that pertain to the evaluation of patients at risk for COVID-19 are in a state of rapid change based on information released by regulatory bodies including the CDC and federal and state organizations. These policies and algorithms were followed during the patient's care in the ED. CHIEF COMPLAINT       Chief Complaint   Patient presents with    Loss of Consciousness    Other     Ingestion of unknown medication       HISTORY OF PRESENT ILLNESS  (Location/Symptom, Timing/Onset, Context/Setting, Quality, Duration, Modifying Factors, Severity.)      Mily Palma is a 32 y.o. male who presents with history of IV drug use, has a history of using fentanyl presents today due to being found unconscious by his mother. His mother had allegedly started performing chest compressions on the patient. Per EMS patient was unconscious, received 2 mg of intranasal Narcan, subsequently 2 mg of IV Narcan to which she responded with a return of awareness, mental status. Prior to patient receiving IV Narcan, EMS attempted nasopharyngeal airway placement which resulted in bleeding around the patient's face and naris. Patient was initially placed in a c-collar due to being found down. Patient is awake and alert in the ED with c-collar in place, c-collar was taken off due to him not meeting Nexus criteria.   Patient is not complaining of any neck pain, he is alert and oriented, however he just feels somewhat woozy. Patient is not complaining of any neck pain, he is not complaining of any headaches, he states that he just feels tired. Patient does not know what drug he took, he thought he was taking ibuprofen. PAST MEDICAL / SURGICAL / SOCIAL / FAMILY HISTORY      has a past medical history of Anxiety, Depression, and History of substance abuse ([facility] [address] has a past surgical history that includes knee surgery; laparoscopic appendectomy (N/A, 01/08/2020); Humerus fracture surgery (Right, 09/22/2021); and Forearm surgery (Left, 4/12/2022). Social History     Socioeconomic History    Marital status: Single     Spouse name: Not on file    Number of children: Not on file    Years of education: Not on file    Highest education level: Not on file   Occupational History    Not on file   Tobacco Use    Smoking status: Every Day     Packs/day: 0.25     Types: Cigarettes    Smokeless tobacco: Never   Vaping Use    Vaping Use: Former    Substances: Nicotine    Devices: Disposable   Substance and Sexual Activity    Alcohol use: Not Currently     Comment: none since 2017    Drug use: Not Currently     Types: Opiates , IV, Marijuana (Dede Croft), Cocaine     Comment: last time 3 weeks ago crack and weed    Sexual activity: Not on file   Other Topics Concern    Not on file   Social History Narrative    Not on file     Social Determinants of Health     Financial Resource Strain: Not on file   Food Insecurity: Not on file   Transportation Needs: Not on file   Physical Activity: Not on file   Stress: Not on file   Social Connections: Not on file   Intimate Partner Violence: Not on file   Housing Stability: Not on file       Family History   Problem Relation Age of Onset    Hypertension Other        Allergies:  Shellfish-derived products    Home Medications:  Prior to Admission medications    Medication Sig Start Date End Date Taking?  Authorizing Provider   ibuprofen (ADVIL;MOTRIN) 600 MG General: Abdomen is flat. Palpations: Abdomen is soft. There is no hepatomegaly, splenomegaly or pulsatile mass. Tenderness: There is no abdominal tenderness. Hernia: No hernia is present. Skin:     General: Skin is warm. Capillary Refill: Capillary refill takes less than 2 seconds. Neurological:      General: No focal deficit present. Mental Status: He is alert. Motor: No weakness.        DIFFERENTIAL  DIAGNOSIS     PLAN (LABS / IMAGING / EKG):  Orders Placed This Encounter   Procedures    Culture, Urine    XR CHEST (SINGLE VIEW FRONTAL)    CBC with Auto Differential    Basic Metabolic Panel    Troponin    Drug Scr, Abuse, Ur    Urinalysis with Reflex to Culture    Microscopic Urinalysis    EKG 12 Lead       MEDICATIONS ORDERED:  Orders Placed This Encounter   Medications    0.9 % sodium chloride bolus           DIAGNOSTIC RESULTS / EMERGENCY DEPARTMENT COURSE / MDM   LAB RESULTS:  Results for orders placed or performed during the hospital encounter of 09/07/22   CBC with Auto Differential   Result Value Ref Range    WBC 7.5 3.5 - 11.0 k/uL    RBC 4.53 4.5 - 5.9 m/uL    Hemoglobin 13.4 (L) 13.5 - 17.5 g/dL    Hematocrit 39.2 (L) 41 - 53 %    MCV 86.6 80 - 100 fL    MCH 29.5 26 - 34 pg    MCHC 34.1 31 - 37 g/dL    RDW 14.0 11.5 - 14.9 %    Platelets 873 628 - 893 k/uL    MPV 8.5 6.0 - 12.0 fL    Seg Neutrophils 79 (H) 36 - 66 %    Lymphocytes 12 (L) 24 - 44 %    Monocytes 6 1 - 7 %    Eosinophils % 2 0 - 4 %    Basophils 1 0 - 2 %    Segs Absolute 5.90 1.3 - 9.1 k/uL    Absolute Lymph # 0.90 (L) 1.0 - 4.8 k/uL    Absolute Mono # 0.50 0.1 - 1.3 k/uL    Absolute Eos # 0.20 0.0 - 0.4 k/uL    Basophils Absolute 0.00 0.0 - 0.2 k/uL   Basic Metabolic Panel   Result Value Ref Range    Glucose 119 (H) 70 - 99 mg/dL    BUN 16 6 - 20 mg/dL    Creatinine 0.88 0.70 - 1.20 mg/dL    Calcium 9.4 8.6 - 10.4 mg/dL    Sodium 138 135 - 144 mmol/L    Potassium 4.0 3.7 - 5.3 mmol/L    Chloride 105 98 - 107 mmol/L    CO2 24 20 - 31 mmol/L    Anion Gap 9 9 - 17 mmol/L    GFR Non-African American >60 >60 mL/min    GFR African American >60 >60 mL/min    GFR Comment         Troponin   Result Value Ref Range    Troponin, High Sensitivity 17 0 - 22 ng/L   Drug Scr, Abuse, Ur   Result Value Ref Range    Amphetamine Screen, Ur NEGATIVE NEGATIVE    Barbiturate Screen, Ur NEGATIVE NEGATIVE    Benzodiazepine Screen, Urine NEGATIVE NEGATIVE    Cocaine Metabolite, Urine NEGATIVE NEGATIVE    Methadone Screen, Urine NEGATIVE NEGATIVE    Opiates, Urine NEGATIVE NEGATIVE    Phencyclidine, Urine NEGATIVE NEGATIVE    Cannabinoid Scrn, Ur POSITIVE (A) NEGATIVE    Oxycodone Screen, Ur NEGATIVE NEGATIVE    Fentanyl, Ur POSITIVE (A) NEGATIVE    Test Information       Assay provides medical screening only. The absence of expected drug(s) and/or metabolite(s) may indicate diluted or adulterated urine, limitations of testing or timing of collection.    Urinalysis with Reflex to Culture    Specimen: Urine, clean catch   Result Value Ref Range    Color, UA Yellow Yellow    Turbidity UA Clear Clear    Glucose, Ur NEGATIVE NEGATIVE    Bilirubin Urine NEGATIVE NEGATIVE    Ketones, Urine NEGATIVE NEGATIVE    Specific Gravity, UA 1.026 1.000 - 1.030    Urine Hgb TRACE (A) NEGATIVE    pH, UA 5.5 5.0 - 8.0    Protein, UA 1+ (A) NEGATIVE    Urobilinogen, Urine Normal Normal    Nitrite, Urine NEGATIVE NEGATIVE    Leukocyte Esterase, Urine NEGATIVE NEGATIVE   Microscopic Urinalysis   Result Value Ref Range    WBC, UA 10 TO 20 /HPF    RBC, UA 0 TO 2 /HPF    Casts UA 3 to 5 /LPF    Casts UA COARSELY GRANULAR /LPF    Epithelial Cells UA 3 to 5 /HPF    Bacteria, UA FEW (A) None    Mucus, UA 1+ (A) None   EKG 12 Lead   Result Value Ref Range    Ventricular Rate 79 BPM    Atrial Rate 79 BPM    P-R Interval 146 ms    QRS Duration 98 ms    Q-T Interval 352 ms    QTc Calculation (Bazett) 403 ms    P Axis 7 degrees    R Axis 13 degrees    T Axis 18 degrees RADIOLOGY:  XR CHEST (SINGLE VIEW FRONTAL)    Result Date: 9/7/2022  No acute cardiopulmonary abnormality. Assessment/Plan: 29-year-old male history of opioid abuse presented with per EMS after being found unconscious, responded to 4 mg of Narcan. Patient is hemodynamically stable alert and oriented x3, this was an accidental overdose according to the patient. Patient was not aware that he had taken fentanyl. Urine drug test tested positive for opioids. Patient was hemodynamically stable appropriate for discharge remainder of work-up was otherwise unremarkable. Patient was given ER return precautions no further intervention needed at this time. FINAL IMPRESSION      1.  Opioid overdose, accidental or unintentional, initial encounter [facility])          DISPOSITION / PLAN     DISPOSITION Decision To Discharge 09/07/2022 07:24:35 PM      PATIENT REFERRED TO:  [facility] ED  [facility] [address] [phone]  Go to   As needed    DISCHARGE MEDICATIONS:  Discharge Medication List as of 9/7/2022  8:26 PM          Apurva House DO  Emergency Medicine Resident    (Please note that portions of thisnote were completed with a voice recognition program.  Efforts were made to edit the dictations but occasionally words are mis-transcribed.)        Kristi Marley DO  Resident  09/08/22 0912

## 2022-09-07 NOTE — DISCHARGE INSTRUCTIONS
You have been seen in the ER today for opiate overdose, urine tested positive for fentanyl. Please return to the ER if you have any questions or concerns. Please refrain from using drugs  If you begin to experience any symptoms such as chest pain shortness of breath nausea vomiting dizziness drowsiness abdominal pain loss of consciousness or any other symptoms you find concerning please return to the ED for follow-up evaluation. If you have been given pain medication please take them only as prescribed for the your symptoms. Do not take more medication than recommended at any given time. Please follow-up with your primary care provider within 5 to 7 days for continued care, sooner if you have concerns.

## 2022-09-08 ENCOUNTER — HOSPITAL ENCOUNTER (EMERGENCY)
Age: 27
Discharge: HOME | End: 2022-09-08
Attending: EMERGENCY MEDICINE
Payer: MEDICARE

## 2022-09-08 VITALS
BODY MASS INDEX: 32.21 KG/M2 | HEIGHT: 70 IN | WEIGHT: 225 LBS | RESPIRATION RATE: 18 BRPM | OXYGEN SATURATION: 97 % | SYSTOLIC BLOOD PRESSURE: 136 MMHG | TEMPERATURE: 98.7 F | HEART RATE: 82 BPM | DIASTOLIC BLOOD PRESSURE: 74 MMHG

## 2022-09-08 DIAGNOSIS — T40.604D OPIATE OVERDOSE, UNDETERMINED INTENT, SUBSEQUENT ENCOUNTER: Primary | ICD-10-CM

## 2022-09-08 LAB
CULTURE: NO GROWTH
EKG ATRIAL RATE: 79 BPM
EKG P AXIS: 7 DEGREES
EKG P-R INTERVAL: 146 MS
EKG Q-T INTERVAL: 352 MS
EKG QRS DURATION: 98 MS
EKG QTC CALCULATION (BAZETT): 403 MS
EKG R AXIS: 13 DEGREES
EKG T AXIS: 18 DEGREES
EKG VENTRICULAR RATE: 79 BPM
SPECIMEN DESCRIPTION: NORMAL

## 2022-09-08 PROCEDURE — 93010 ELECTROCARDIOGRAM REPORT: CPT | Performed by: INTERNAL MEDICINE

## 2022-09-08 PROCEDURE — 99284 EMERGENCY DEPT VISIT MOD MDM: CPT

## 2022-09-08 ASSESSMENT — ENCOUNTER SYMPTOMS
COLOR CHANGE: 0
BACK PAIN: 0
VOMITING: 0
SHORTNESS OF BREATH: 0
DIARRHEA: 0
NAUSEA: 0
ABDOMINAL PAIN: 0
COUGH: 0
TROUBLE SWALLOWING: 0
CHEST TIGHTNESS: 0

## 2022-09-08 NOTE — ED PROVIDER NOTES
[address]  eMERGENCY dEPARTMENT eNCOUnter   Attending Attestation     Pt Name: Adriane Mays  MRN: [id number]  [place] 1995  Date of evaluation: 9/8/22       Adriane Mays is a 32 y.o. male who presents with Drug Overdose      History:   22-year-old male presenting with drug overdose. Patient was here yesterday for the same issue and it was fentanyl. Patient states he wants to go to an acute rehab facility. Exam: Vitals:   Vitals:    09/08/22 1732   BP: 132/89   Pulse: 91   Resp: 16   Temp: 98.7 °F (37.1 °C)   TempSrc: Oral   SpO2: 99%   Weight: 225 lb (102.1 kg)   Height: 5' 10\" (1.778 m)     Patient observed in the ER and then transferred over to an acute rehab facility to help with his fentanyl addiction. Patient instructed to follow-up with primary care physician within 2 days and to return to the ER immediately if symptoms worsen or change. Patient understands and agrees with the plan. The  transported the patient to the rehab facility. I performed a history and physical examination of the patient and discussed management with the resident. I reviewed the residents note and agree with the documented findings and plan of care. Any areas of disagreement are noted on the chart. I was personally present for the key portions of any procedures. I have documented in the chart those procedures where I was not present during the key portions. I have personally reviewed all images and agree with the resident's interpretation. I have reviewed the emergency nurses triage note. I agree with the chief complaint, past medical history, past surgical history, allergies, medications, social and family history as documented unless otherwise noted below. Documentation of the HPI, Physical Exam and Medical Decision Making performed by medical students or scribes is based on my personal performance of the HPI, PE and MDM.  I personally evaluated and examined the patient in conjunction with the APC and agree with the assessment, treatment plan, and disposition of the patient as recorded by the APC. Additional findings are as noted.     Eleno Sweeney DO  Attending Emergency  Physician             Eleno Sweeney DO  09/08/22 4491

## 2022-09-08 NOTE — ED NOTES
Pt presents to ED from home after the patient over dosed. Pt's family administered narcan, and them EMS administered 4 more by IV. Upon arrival the patient was alert and oriented x4. Pt does have an abrasion to his forehead and knee pain from falling.       Jhonathan Mclain RN  09/08/22 3192

## 2022-09-08 NOTE — DISCHARGE INSTRUCTIONS
You have been seen in the ER today for opioid overdose. You will be discharged to an inpatient rehab facility. If you begin to experience any symptoms such as chest pain shortness of breath nausea vomiting dizziness drowsiness abdominal pain loss of consciousness or any other symptoms you find concerning please return to the ED for follow-up evaluation. If you have been given pain medication please take them only as prescribed for the your symptoms. Do not take more medication than recommended at any given time. Please follow-up with your primary care provider within 5 to 7 days for continued care, sooner if you have concerns.

## 2022-09-08 NOTE — ED NOTES
pts mother called and stated that the patient express thoughts of suicide this morning and then she found him overdosed on her front porch. Pt is denying suicidal thoughts and states that his mom just wants him pink slipped. Pt states clearly states that he is not suicidal but would like help with detox.       Bin Navarro RN  09/08/22 8115

## 2022-09-08 NOTE — ED PROVIDER NOTES
[address]  Emergency Department Encounter  EmergencyMedicine Resident     Pt Name:Suraj King  MRN: [id number]  [place] 1995  Date of evaluation: 9/8/22  PCP:  No primary care provider on file. This patient was evaluated in the Emergency Department for symptoms described in the history of present illness. The patient was evaluated in the context of the global COVID-19 pandemic, which necessitated consideration that the patient might be at risk for infection with the SARS-CoV-2 virus that causes COVID-19. Institutional protocols and algorithms that pertain to the evaluation of patients at risk for COVID-19 are in a state of rapid change based on information released by regulatory bodies including the CDC and federal and state organizations. These policies and algorithms were followed during the patient's care in the ED. CHIEF COMPLAINT       Chief Complaint   Patient presents with    Drug Overdose       HISTORY OF PRESENT ILLNESS  (Location/Symptom, Timing/Onset, Context/Setting, Quality, Duration, Modifying Factors, Severity.)      Candida Kelly is a 32 y.o. male who presents with fentanyl overdose    EMS was called by his mother after he took fentanyl again. Patient was seen yesterday as well for the same reason. EMS gave the patient Narcan, patient regained consciousness, is alert and oriented    Patient states that he has a mild headache similar to yesterday however otherwise no other symptoms  . Patient states that he wants to receive help for his substance abuse problem. PAST MEDICAL / SURGICAL / SOCIAL / FAMILY HISTORY      has a past medical history of Anxiety, Depression, and History of substance abuse ([facility] [address] has a past surgical history that includes knee surgery; laparoscopic appendectomy (N/A, 01/08/2020); Humerus fracture surgery (Right, 09/22/2021); and Forearm surgery (Left, 4/12/2022).       Social History     Socioeconomic History    Marital status: Single     Spouse name: Not on file    Number of children: Not on file    Years of education: Not on file    Highest education level: Not on file   Occupational History    Not on file   Tobacco Use    Smoking status: Every Day     Packs/day: 0.25     Types: Cigarettes    Smokeless tobacco: Never   Vaping Use    Vaping Use: Former    Substances: Nicotine    Devices: Disposable   Substance and Sexual Activity    Alcohol use: Not Currently     Comment: none since 2017    Drug use: Not Currently     Types: Opiates , IV, Marijuana (Chyrl Kristen), Cocaine     Comment: last time 3 weeks ago crack and weed    Sexual activity: Not on file   Other Topics Concern    Not on file   Social History Narrative    Not on file     Social Determinants of Health     Financial Resource Strain: Not on file   Food Insecurity: Not on file   Transportation Needs: Not on file   Physical Activity: Not on file   Stress: Not on file   Social Connections: Not on file   Intimate Partner Violence: Not on file   Housing Stability: Not on file       Family History   Problem Relation Age of Onset    Hypertension Other        Allergies:  Shellfish-derived products    Home Medications:  Prior to Admission medications    Medication Sig Start Date End Date Taking? Authorizing Provider   ibuprofen (ADVIL;MOTRIN) 600 MG tablet TAKE ONE TABLET BY MOUTH EVERY 6 HOURS AS NEEDED FOR MILD PAIN 2/24/22   Historical Provider, MD   gabapentin (NEURONTIN) 400 MG capsule Take 400 mg by mouth 3 times daily. Historical Provider, MD   buprenorphine-naloxone (SUBOXONE) 8-2 MG FILM SL film Place 1 Film under the tongue daily. Historical Provider, MD       REVIEW OF SYSTEMS    (2-9 systems for level 4, 10 or more for level 5)      Review of Systems   Constitutional:  Negative for chills, fatigue and fever. HENT:  Negative for congestion and trouble swallowing. Eyes:  Negative for visual disturbance.    Respiratory:  Negative for cough, chest tightness and shortness of breath. Gastrointestinal:  Negative for abdominal pain, diarrhea, nausea and vomiting. Endocrine: Negative for polyuria. Genitourinary:  Negative for dysuria, flank pain, hematuria and urgency. Musculoskeletal:  Negative for back pain and myalgias. Skin:  Negative for color change. Allergic/Immunologic: Negative for immunocompromised state. Neurological:  Negative for dizziness, syncope, weakness, light-headedness and headaches. PHYSICAL EXAM   (up to 7 for level 4, 8 or more for level 5)      INITIAL VITALS:   /89   Pulse 91   Temp 98.7 °F (37.1 °C) (Oral)   Resp 16   Ht 5' 10\" (1.778 m)   Wt 225 lb (102.1 kg)   SpO2 99%   BMI 32.28 kg/m²     Physical Exam  Constitutional:       Appearance: He is well-developed. HENT:      Head: Normocephalic and atraumatic. Nose: Nose normal.      Mouth/Throat:      Mouth: Mucous membranes are moist.   Eyes:      Conjunctiva/sclera: Conjunctivae normal.   Cardiovascular:      Rate and Rhythm: Normal rate and regular rhythm. Heart sounds: Normal heart sounds. No murmur heard. No friction rub. Pulmonary:      Effort: Pulmonary effort is normal. No respiratory distress. Breath sounds: Normal breath sounds. No rales. Chest:      Chest wall: No tenderness. Abdominal:      General: Abdomen is flat. Palpations: Abdomen is soft. There is no hepatomegaly, splenomegaly or pulsatile mass. Tenderness: There is no abdominal tenderness. Hernia: No hernia is present. Skin:     General: Skin is warm. Capillary Refill: Capillary refill takes less than 2 seconds. Neurological:      General: No focal deficit present. Mental Status: He is alert. Motor: No weakness. DIFFERENTIAL  DIAGNOSIS     PLAN (LABS / IMAGING / EKG):  No orders of the defined types were placed in this encounter. MEDICATIONS ORDERED:  No orders of the defined types were placed in this encounter.           DIAGNOSTIC RESULTS / EMERGENCY DEPARTMENT COURSE / [facility]   LAB RESULTS:  No results found for this visit on 09/08/22. EMERGENCY DEPARTMENT COURSE:  ED Course as of 09/09/22 0108   Thu Sep 08, 2022   1957 Patient is feeling much better, to be transported to inpatient rehab facility by security. [KK]      ED Course User Index  301 Alessio Corona DO         Assessment/Plan:   49-year-old male presenting with opiate overdose. Patient was treated appropriately with Narcan, no symptoms currently in the ED and upon reevaluation. Patient is hemodynamically stable    Patient wants to proceed health, patient was taken to inpatient rehabilitation facility by security. FINAL IMPRESSION      1. Opiate overdose, undetermined intent, subsequent encounter          DISPOSITION / PLAN     DISPOSITION        PATIENT REFERRED TO:  No follow-up provider specified.     DISCHARGE MEDICATIONS:  New Prescriptions    No medications on file       Faustino Corrales DO  Emergency Medicine Resident    (Please note that portions of thisnote were completed with a voice recognition program.  Efforts were made to edit the dictations but occasionally words are mis-transcribed.)       Smitha Galvan DO  Resident  09/09/22 3755

## 2022-09-09 ASSESSMENT — ENCOUNTER SYMPTOMS
CHEST TIGHTNESS: 0
TROUBLE SWALLOWING: 0
VOMITING: 0
DIARRHEA: 0
SHORTNESS OF BREATH: 0
COLOR CHANGE: 0
ABDOMINAL PAIN: 0
NAUSEA: 0
COUGH: 0
BACK PAIN: 0

## 2022-11-05 ENCOUNTER — HOSPITAL ENCOUNTER (EMERGENCY)
Age: 27
Discharge: HOME | End: 2022-11-05
Attending: EMERGENCY MEDICINE
Payer: MEDICARE

## 2022-11-05 ENCOUNTER — APPOINTMENT (OUTPATIENT)
Dept: CT IMAGING | Age: 27
End: 2022-11-05
Payer: MEDICARE

## 2022-11-05 VITALS
WEIGHT: 220 LBS | SYSTOLIC BLOOD PRESSURE: 127 MMHG | OXYGEN SATURATION: 100 % | RESPIRATION RATE: 16 BRPM | HEIGHT: 70 IN | TEMPERATURE: 98.4 F | HEART RATE: 88 BPM | DIASTOLIC BLOOD PRESSURE: 88 MMHG | BODY MASS INDEX: 31.5 KG/M2

## 2022-11-05 DIAGNOSIS — T40.2X1A OPIOID OVERDOSE, ACCIDENTAL OR UNINTENTIONAL, INITIAL ENCOUNTER (HCC): ICD-10-CM

## 2022-11-05 DIAGNOSIS — R40.4 TRANSIENT ALTERATION OF AWARENESS: Primary | ICD-10-CM

## 2022-11-05 LAB
ABSOLUTE EOS #: 0.09 K/UL (ref 0–0.44)
ABSOLUTE IMMATURE GRANULOCYTE: <0.03 K/UL (ref 0–0.3)
ABSOLUTE LYMPH #: 1.32 K/UL (ref 1.1–3.7)
ABSOLUTE MONO #: 0.44 K/UL (ref 0.1–1.2)
ACETAMINOPHEN LEVEL: <5 UG/ML (ref 10–30)
ALBUMIN SERPL-MCNC: 4.6 G/DL (ref 3.5–5.2)
ALBUMIN/GLOBULIN RATIO: 1.8 (ref 1–2.5)
ALP BLD-CCNC: 78 U/L (ref 40–129)
ALT SERPL-CCNC: 40 U/L (ref 5–41)
AMPHETAMINE SCREEN URINE: NEGATIVE
ANION GAP SERPL CALCULATED.3IONS-SCNC: 10 MMOL/L (ref 9–17)
AST SERPL-CCNC: 34 U/L
BARBITURATE SCREEN URINE: NEGATIVE
BASOPHILS # BLD: 1 % (ref 0–2)
BASOPHILS ABSOLUTE: 0.05 K/UL (ref 0–0.2)
BENZODIAZEPINE SCREEN, URINE: NEGATIVE
BILIRUB SERPL-MCNC: 0.3 MG/DL (ref 0.3–1.2)
BILIRUBIN URINE: NEGATIVE
BUN BLDV-MCNC: 16 MG/DL (ref 6–20)
CALCIUM SERPL-MCNC: 9.3 MG/DL (ref 8.6–10.4)
CANNABINOID SCREEN URINE: NEGATIVE
CHLORIDE BLD-SCNC: 102 MMOL/L (ref 98–107)
CO2: 25 MMOL/L (ref 20–31)
COCAINE METABOLITE, URINE: NEGATIVE
COLOR: YELLOW
COMMENT UA: ABNORMAL
CREAT SERPL-MCNC: 0.8 MG/DL (ref 0.7–1.2)
EOSINOPHILS RELATIVE PERCENT: 1 % (ref 1–4)
ETHANOL PERCENT: <0.01 %
ETHANOL: <10 MG/DL
FENTANYL URINE: POSITIVE
GFR SERPL CREATININE-BSD FRML MDRD: >60 ML/MIN/1.73M2
GLUCOSE BLD-MCNC: 100 MG/DL (ref 70–99)
GLUCOSE URINE: NEGATIVE
HCT VFR BLD CALC: 41.7 % (ref 40.7–50.3)
HEMOGLOBIN: 14.1 G/DL (ref 13–17)
IMMATURE GRANULOCYTES: 0 %
KETONES, URINE: NEGATIVE
LEUKOCYTE ESTERASE, URINE: NEGATIVE
LYMPHOCYTES # BLD: 19 % (ref 24–43)
MCH RBC QN AUTO: 29.1 PG (ref 25.2–33.5)
MCHC RBC AUTO-ENTMCNC: 33.8 G/DL (ref 28.4–34.8)
MCV RBC AUTO: 86.2 FL (ref 82.6–102.9)
METHADONE SCREEN, URINE: NEGATIVE
MONOCYTES # BLD: 6 % (ref 3–12)
NITRITE, URINE: NEGATIVE
NRBC AUTOMATED: 0 PER 100 WBC
OPIATES, URINE: NEGATIVE
OXYCODONE SCREEN URINE: NEGATIVE
PDW BLD-RTO: 12.3 % (ref 11.8–14.4)
PH UA: 6.5 (ref 5–8)
PHENCYCLIDINE, URINE: NEGATIVE
PLATELET # BLD: 197 K/UL (ref 138–453)
PMV BLD AUTO: 10 FL (ref 8.1–13.5)
POTASSIUM SERPL-SCNC: 4.4 MMOL/L (ref 3.7–5.3)
PROTEIN UA: NEGATIVE
RBC # BLD: 4.84 M/UL (ref 4.21–5.77)
SALICYLATE LEVEL: <1 MG/DL (ref 3–10)
SEG NEUTROPHILS: 73 % (ref 36–65)
SEGMENTED NEUTROPHILS ABSOLUTE COUNT: 5.06 K/UL (ref 1.5–8.1)
SODIUM BLD-SCNC: 137 MMOL/L (ref 135–144)
SPECIFIC GRAVITY UA: 1 (ref 1–1.03)
TEST INFORMATION: ABNORMAL
TOTAL PROTEIN: 7.1 G/DL (ref 6.4–8.3)
TOXIC TRICYCLIC SC,BLOOD: NEGATIVE
TURBIDITY: CLEAR
URINE HGB: NEGATIVE
UROBILINOGEN, URINE: NORMAL
WBC # BLD: 7 K/UL (ref 3.5–11.3)

## 2022-11-05 PROCEDURE — 81003 URINALYSIS AUTO W/O SCOPE: CPT

## 2022-11-05 PROCEDURE — 99284 EMERGENCY DEPT VISIT MOD MDM: CPT

## 2022-11-05 PROCEDURE — 80143 DRUG ASSAY ACETAMINOPHEN: CPT

## 2022-11-05 PROCEDURE — G0480 DRUG TEST DEF 1-7 CLASSES: HCPCS

## 2022-11-05 PROCEDURE — 80179 DRUG ASSAY SALICYLATE: CPT

## 2022-11-05 PROCEDURE — 70450 CT HEAD/BRAIN W/O DYE: CPT

## 2022-11-05 PROCEDURE — 85025 COMPLETE CBC W/AUTO DIFF WBC: CPT

## 2022-11-05 PROCEDURE — 80053 COMPREHEN METABOLIC PANEL: CPT

## 2022-11-05 PROCEDURE — 80307 DRUG TEST PRSMV CHEM ANLYZR: CPT

## 2022-11-05 RX ORDER — FENTANYL CITRATE 50 UG/ML
75 INJECTION, SOLUTION INTRAMUSCULAR; INTRAVENOUS ONCE
Status: DISCONTINUED | OUTPATIENT
Start: 2022-11-05 | End: 2022-11-05

## 2022-11-05 ASSESSMENT — ENCOUNTER SYMPTOMS
SHORTNESS OF BREATH: 0
BACK PAIN: 0
DIARRHEA: 0
VOMITING: 0
COUGH: 0
ABDOMINAL PAIN: 0
CONSTIPATION: 0
RHINORRHEA: 0
NAUSEA: 0

## 2022-11-05 ASSESSMENT — PAIN DESCRIPTION - LOCATION: LOCATION: HEAD

## 2022-11-05 ASSESSMENT — LIFESTYLE VARIABLES: HOW OFTEN DO YOU HAVE A DRINK CONTAINING ALCOHOL: NEVER

## 2022-11-05 ASSESSMENT — PAIN DESCRIPTION - ORIENTATION: ORIENTATION: LEFT

## 2022-11-05 ASSESSMENT — PAIN SCALES - GENERAL
PAINLEVEL_OUTOF10: 10
PAINLEVEL_OUTOF10: 0

## 2022-11-05 ASSESSMENT — PAIN - FUNCTIONAL ASSESSMENT: PAIN_FUNCTIONAL_ASSESSMENT: 0-10

## 2022-11-05 NOTE — ED NOTES
Patient is alert and oriented x4, answering questions appropriately. Respirations even and unlabored. Patient changed into gown, placed on full cardiac monitor, BP cuff, and pulse ox. Call light within reach. Will continue to monitor.      Nam Hilliard RN  11/05/22 7100

## 2022-11-05 NOTE — ED PROVIDER NOTES
Faculty Sign-Out Attestation  Handoff taken on the following patient from prior Attending Physician: Danielle Phillips    I was available and discussed any additional care issues that arose and coordinated the management plans with the resident(s) caring for the patient during my duty period. Any areas of disagreement with residents documentation of care or procedures are noted on the chart. I was personally present for the key portions of any/all procedures during my duty period. I have documented in the chart those procedures where I was not present during the key portions. 64-year-old male presenting with head numbness, possible seizure, sudden cessation of daily 16 mg of Suboxone. Resident discussing with [facility] what event actually occurred today to confirm if seizure occurred or not. Lab and imaging to rule out sinister causes of head numbness.     Tania Webber MD  Attending Physician       Tania Webber MD  11/05/22 9047

## 2022-11-05 NOTE — ED NOTES
Pt provided orange juice, warm blanket and side rails padded. Pt requesting to speak with resident, states he has a headache. Dr. Evi Solis at bedside to speak with pt.       Lizz Melton RN  11/05/22 6568
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
## 2022-11-05 NOTE — ED NOTES
The following labs were labeled with appropriate pt sticker and tubed to lab:     [] Blue     [] Lavender   [] on ice  [] Green/yellow  [] Green/black [] on ice  [] Leslie Soulier  [] on ice  [] Yellow  [] Red  [] Type/ Screen  [] ABG  [] VBG    [] COVID-19 swab    [] Rapid  [] PCR  [] Flu swab  [] Peds Viral Panel     [x] Urine Sample  [] Pelvic Cultures  [] Blood Cultures  [] X 2  [] STREP Cultures     Bibiana Willingham RN  11/05/22 3673
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
## 2022-11-05 NOTE — ED NOTES
The following labs were labeled with appropriate pt sticker and tubed to lab:     [x] Blue     [x] Lavender   [] on ice  [x] Green/yellow  [x] Green/black [] on ice  [] Scherrie Flattery  [] on ice  [] Yellow  [x] Red  [] Type/ Screen  [] ABG  [] VBG    [] COVID-19 swab    [] Rapid  [] PCR  [] Flu swab  [] Peds Viral Panel     [] Urine Sample  [] Pelvic Cultures  [] Blood Cultures  [] X 2  [] STREP Cultures     Austin Johnston RN  11/05/22 2010

## 2022-11-05 NOTE — ED NOTES
Report given to Renato Montoya RN.  All questions answered at this time     Bibiana Willingham, [facility]  11/05/22 1926

## 2022-11-05 NOTE — DISCHARGE INSTRUCTIONS
You were seen in the emergency department today for opioid overdose. Work-up is reassuring. Please discuss with dart officer options for going inpatient for treatment. I highly recommend you do so to decrease any chance of overdose in the future. If you have any new or worsening symptoms, please return to the ED for reevaluation    Thank you for visiting [facility] Emergency Department. You need to call No primary care provider on file. to make an appointment as directed for follow up. Should you have any questions regarding your care or further treatment, please call [address] [place] [address] Emergency Department at [phone]. Take any medications as prescribed, if given any, otherwise for pain Use ibuprofen or Tylenol (unless prescribed medications that have Tylenol in it). You can take over the counter Ibuprofen (advil) tablets (4 tablets every 8 hours or 3 tablets every 6 hours or 2 tablets every 4 hours)    If given narcotics during this ED visit, please do not drive or operate heavy machinery for at least 4-6 hours. PLEASE RETURN TO THE ED IMMEDIATELY for worsening symptoms, or if you develop any concerning symptoms such as: high fever not relieved by tylenol and/or motrin, chills, shortness of breath, chest pain, persistent nausea and/or vomiting, numbness, weakness or tingling in the arms or legs or change in color of the extremities, changes in mental status, persistent headache, blurry vision, inability to urinate, unable to follow up with your physician, or other any other  Care or concern.

## 2022-11-05 NOTE — ED PROVIDER NOTES
[facility]  Emergency Department  Faculty Attestation     I performed a history and physical examination of the patient and discussed management with the resident. I reviewed the residents note and agree with the documented findings and plan of care. Any areas of disagreement are noted on the chart. I was personally present for the key portions of any procedures. I have documented in the chart those procedures where I was not present during the key portions. I have reviewed the emergency nurses triage note. I agree with the chief complaint, past medical history, past surgical history, allergies, medications, social and family history as documented unless otherwise noted below. For Physician Assistant/ Nurse Practitioner cases/documentation I have personally evaluated this patient and have completed at least one if not all key elements of the E/M (history, physical exam, and MDM). Additional findings are as noted. Primary Care Physician:  No primary care provider on file. Screenings:  [unfilled]    CHIEF COMPLAINT     No chief complaint on file. RECENT VITALS:    ,   ,  ,      LABS:  Labs Reviewed - No data to display    Radiology  No orders to display       CRITICAL CARE: There was a high probability of clinically significant/life threatening deterioration in this patient's condition which required my urgent intervention. Total critical care time was none minutes. This excludes any time for separately reportable procedures. EKG:      Attending Physician Additional  Notes    Patient has been on Suboxone 16 mg for over a month and that abruptly discontinued several days ago. He had been in [facility] for the past 2 days. Today he was \"blacked out\" where he was unaware of external environment, screaming and yelling, had apparently generalized seizure activity though this is not documented in a relay of information.   Someone found that he was cyanotic and had some respiratory depression and he was given 2 mg of Narcan and he arrived here. Patient states he has had 5 days of numbness along the left parietal scalp. Severe headaches much worse than normal but not sudden onset. He believes he has had a seizure activity with prior opioid overdoses but no seizure work-up. No family history of epilepsy. Not on anticoagulation. He does note that he was quite thirsty and no amount of oral fluid would quench his thirst.  On exam he appears somnolent, bilateral ptosis, normal vital signs. He has pulse oximetry at most times of 96% with good waveform. Then during his conversation, with what appear normal speech he desaturates to 86% again with normal waveform. No cyanosis noted. Normal pupils. Normal speech and mentation. Extraocular moods are full. Normal motor strength. Lungs are clear. Neck is supple nontender. No evidence of head trauma. He does show me on his scalp where his scalp is no. Impression is possible seizure disorder, likely behavior issue, chronic opioid use with some withdrawal symptomatology, possible opioid overdose. Plan is CT brain, laboratory studies, neurology consultation, assess vital signs for desaturation, consider discussion with his psychiatric facility, COWS score, reassess. Fredrick Echeverria.  Nish Murguia MD, [facility]  Attending Emergency  Physician                Jessica Cano MD  11/05/22 0020

## 2022-11-05 NOTE — ED NOTES
Writer escorted pt to the restroom. Pt ambulated with steady gait. Pt given urine cup.       Francine Pond RN  11/05/22 3770

## 2022-11-05 NOTE — ED PROVIDER NOTES
[facility] ED  Emergency Department Encounter  Emergency Medicine Resident     Pt Name: Daron Huggins  MRN: [id number]  Lesliegfgunjan 1995  Date of evaluation: 11/5/22  PCP:  No primary care provider on file. CHIEF COMPLAINT       Chief Complaint   Patient presents with    Other       HISTORY OFPRESENT ILLNESS  (Location/Symptom, Timing/Onset, Context/Setting, Quality, Duration, Modifying Factors,Severity.)      Daron Huggins is a 32 y.o. male who presents with possible overdose. Multiple different stories obtained from different sources. Initial story was that the patient was at [facility], had episode of acting oddly, possible unresponsiveness and he was given Narcan with improvement of symptoms. On arrival, patient walked into the room without any difficulty. He states he was previously on Suboxone, stopped taking this 5 days ago and was admitted at [facility] for detox by report. He states he was there for 3 days, went to [facility] after that with some continued mild withdrawal symptoms. He states that withdrawal symptoms continued into today and were severe enough that he went to [facility] for [facility]". He tells me he did not get this at that facility. He did receive gabapentin and Atarax. When he returned to [facility], there is an episode of the patient not acting normally and he ended up receiving Narcan. He returned to baseline after that. When attending physician evaluate the patient after my initial evaluation, patient reported possible seizure and was more drowsy during evaluation. PAST MEDICAL / SURGICAL / SOCIAL / FAMILY HISTORY      has a past medical history of Anxiety, Depression, and History of substance abuse ([facility] [address] has a past surgical history that includes knee surgery; laparoscopic appendectomy (N/A, 01/08/2020); Humerus fracture surgery (Right, 09/22/2021); and Forearm surgery (Left, 4/12/2022).      Social History     Socioeconomic Cardiovascular:  Negative for chest pain. Gastrointestinal:  Negative for abdominal pain, constipation, diarrhea, nausea and vomiting. Genitourinary:  Negative for dysuria and frequency. Musculoskeletal:  Negative for back pain and neck pain. Skin:  Negative for rash. Neurological:  Positive for headaches. Negative for weakness and numbness. PHYSICAL EXAM   (up to 7 for level 4, 8 or more forlevel 5)      INITIAL VITALS:   Vitals:    11/05/22 1633 11/05/22 1700 11/05/22 1715   BP: (!) 146/99 133/81 (!) 133/90   Pulse:  84 92   Resp:  20 17   SpO2: 98% 94% 96%   Weight:   220 lb (99.8 kg)   Height:   5' 10\" (1.778 m)          Physical Exam  Constitutional:       General: He is not in acute distress. Appearance: Normal appearance. He is not ill-appearing, toxic-appearing or diaphoretic. HENT:      Head: Normocephalic and atraumatic. Mouth/Throat:      Mouth: Mucous membranes are moist.      Pharynx: Oropharynx is clear. Eyes:      Extraocular Movements: Extraocular movements intact. Conjunctiva/sclera: Conjunctivae normal.      Pupils: Pupils are equal, round, and reactive to light. Cardiovascular:      Rate and Rhythm: Normal rate and regular rhythm. Heart sounds: Normal heart sounds. No murmur heard. Pulmonary:      Effort: Pulmonary effort is normal. No respiratory distress. Breath sounds: Normal breath sounds. No wheezing or rhonchi. Abdominal:      Palpations: Abdomen is soft. Tenderness: There is no abdominal tenderness. There is no guarding or rebound. Musculoskeletal:         General: Normal range of motion. Cervical back: Normal range of motion and neck supple. Skin:     General: Skin is warm and dry. Neurological:      General: No focal deficit present. Mental Status: He is alert and oriented to person, place, and time. Cranial Nerves: No cranial nerve deficit. Sensory: No sensory deficit. Motor: No weakness. DIFFERENTIAL  DIAGNOSIS     PLAN (LABS / IMAGING / EKG):  Orders Placed This Encounter   Procedures    CT Head W/O Contrast    CBC with Auto Differential    CMP    Urine Drug Screen    TOX SCR, BLD, ED    Urinalysis with Reflex to Culture    Insert peripheral IV       MEDICATIONS ORDERED:  Orders Placed This Encounter   Medications    DISCONTD: fentaNYL (SUBLIMAZE) injection 75 mcg       DDX: Drug overdose, seizures, medication effect, electrolyte abnormality    Initial MDM/Plan/ED COURSE:    32 y.o. male who presents with possible overdose but with mention of questionable seizures. Patient thinks he has seized with overdose in the past.  Vitals stable. Patient awake and alert during my exam, appeared more drowsy for attending physician's exam.  No neurodeficits. Patient's story has changed multiple times, he mentions that he remembers wiping his hand on some \"chunky brown substance\" on the top of the toilet at [facility] and thinks he may have absorbed something from that. He also mentions that this happened another time when he was receiving Vivitrol in 2019, he said his hand in cocaine and tested \"dirty\" later on due to that. Given concern for possible seizure, will obtain labs, CT head and urine drug screen. Updated by RN that patient took a while to provide urine sample, and sample provided was very clear, suspicious for water. ED Course as of 11/05/22 2003   Sat Nov 05, 2022   1851 CT Head W/O Contrast  IMPRESSION:  No acute abnormality. [JS]   1920 Patient updated on negative work-up. Suspicious that urinalysis may have been water as spec gravity is low. Discussed the case with social work, who had spoken with [employer] officer. Patient is released to [facility]. He has to go to booking but then can talk to his [employer] officer about inpatient treatment there.   Patient understands, is to be discharged in stable condition, will be a call upon release [JS]      ED Course User Index  [JS] Zac Walls DO Bimal    :     DIAGNOSTIC RESULTS / EMERGENCYDEPARTMENT COURSE / MDM     LABS:  Labs Reviewed   CBC WITH AUTO DIFFERENTIAL - Abnormal; Notable for the following components:       Result Value    Seg Neutrophils 73 (*)     Lymphocytes 19 (*)     All other components within normal limits   COMPREHENSIVE METABOLIC PANEL - Abnormal; Notable for the following components:    Glucose 100 (*)     All other components within normal limits   URINE DRUG SCREEN - Abnormal; Notable for the following components:    Fentanyl, Ur POSITIVE (*)     All other components within normal limits   TOX SCR, BLD, ED - Abnormal; Notable for the following components:    Acetaminophen Level <5 (*)     Salicylate Lvl <1 (*)     All other components within normal limits   URINALYSIS WITH REFLEX TO CULTURE - Abnormal; Notable for the following components:    Specific Gravity, UA 1.001 (*)     All other components within normal limits           CT Head W/O Contrast    Result Date: 11/5/2022  EXAMINATION: CT OF THE HEAD WITHOUT CONTRAST  11/5/2022 5:06 pm TECHNIQUE: CT of the head was performed without the administration of intravenous contrast. Automated exposure control, iterative reconstruction, and/or weight based adjustment of the mA/kV was utilized to reduce the radiation dose to as low as reasonably achievable. COMPARISON: 03/25/2022 HISTORY: Possible seizure, scalp numbness. FINDINGS: BRAIN/VENTRICLES: There is no acute intracranial hemorrhage, mass effect or midline shift. No abnormal extra-axial fluid collection. The gray-white differentiation is maintained without evidence of an acute infarct. There is no evidence of hydrocephalus. ORBITS: The visualized portion of the orbits demonstrate no acute abnormality. SINUSES: The visualized paranasal sinuses and mastoid air cells demonstrate no acute abnormality. SOFT TISSUES/SKULL:  No acute abnormality of the visualized skull or soft tissues. No acute abnormality. EKG      All EKG's are interpreted by the Emergency Department Physicianwho either signs or Co-signs this chart in the absence of a cardiologist.      PROCEDURES:  None    CONSULTS:  None    CRITICAL CARE:  Please see attending note    FINAL IMPRESSION      1. Transient alteration of awareness    2.  Opioid overdose, accidental or unintentional, initial encounter [facility])          DISPOSITION / PLAN     DISPOSITION Decision To Discharge 11/05/2022 07:45:17 PM      PATIENT REFERRED TO:  [facility]  [address] [place] [address]  [phone]    If symptoms worsen    DISCHARGE MEDICATIONS:  New Prescriptions    No medications on file       Erasto Aguilar DO  Emergency Medicine Resident    (Please note that portions of this note were completed with a voice recognition program.Efforts were made to edit the dictations but occasionally words are mis-transcribed.)        Erasto Aguilar DO  Resident  11/05/22 2003

## 2022-11-05 NOTE — ED NOTES
EMILY Officer arrived to meet with patient. Patient will still need to go to [employer] Booking and then they will contact EMILY at that patient if patient would like to go to inpatient treatment. Olesya Gimenez.  [facility]  11/05/22 1908

## 2023-01-20 ENCOUNTER — HOSPITAL ENCOUNTER (EMERGENCY)
Age: 28
Discharge: HOME | End: 2023-01-20
Attending: EMERGENCY MEDICINE
Payer: MEDICARE

## 2023-01-20 ENCOUNTER — APPOINTMENT (OUTPATIENT)
Dept: GENERAL RADIOLOGY | Age: 28
End: 2023-01-20
Payer: MEDICARE

## 2023-01-20 VITALS
HEIGHT: 70 IN | DIASTOLIC BLOOD PRESSURE: 78 MMHG | WEIGHT: 220 LBS | HEART RATE: 81 BPM | BODY MASS INDEX: 31.5 KG/M2 | OXYGEN SATURATION: 94 % | TEMPERATURE: 98.2 F | RESPIRATION RATE: 18 BRPM | SYSTOLIC BLOOD PRESSURE: 138 MMHG

## 2023-01-20 DIAGNOSIS — M25.561 CHRONIC PAIN OF RIGHT KNEE: Primary | ICD-10-CM

## 2023-01-20 DIAGNOSIS — G89.29 CHRONIC PAIN OF RIGHT KNEE: Primary | ICD-10-CM

## 2023-01-20 PROCEDURE — 6360000002 HC RX W HCPCS: Performed by: STUDENT IN AN ORGANIZED HEALTH CARE EDUCATION/TRAINING PROGRAM

## 2023-01-20 PROCEDURE — 96372 THER/PROPH/DIAG INJ SC/IM: CPT

## 2023-01-20 PROCEDURE — 73564 X-RAY EXAM KNEE 4 OR MORE: CPT

## 2023-01-20 PROCEDURE — 99284 EMERGENCY DEPT VISIT MOD MDM: CPT

## 2023-01-20 RX ORDER — KETOROLAC TROMETHAMINE 30 MG/ML
30 INJECTION, SOLUTION INTRAMUSCULAR; INTRAVENOUS ONCE
Status: COMPLETED | OUTPATIENT
Start: 2023-01-20 | End: 2023-01-20

## 2023-01-20 RX ADMIN — KETOROLAC TROMETHAMINE 30 MG: 30 INJECTION, SOLUTION INTRAMUSCULAR; INTRAVENOUS at 12:03

## 2023-01-20 ASSESSMENT — PAIN - FUNCTIONAL ASSESSMENT: PAIN_FUNCTIONAL_ASSESSMENT: 0-10

## 2023-01-20 ASSESSMENT — PAIN SCALES - GENERAL: PAINLEVEL_OUTOF10: 3

## 2023-01-20 ASSESSMENT — PAIN DESCRIPTION - LOCATION: LOCATION: KNEE

## 2023-01-20 ASSESSMENT — PAIN DESCRIPTION - ORIENTATION: ORIENTATION: RIGHT

## 2023-01-20 NOTE — ED NOTES
New pcp appt scheduled with Dr Ruby Treviño on 1/24 @ [address] [place] [address], [facility]  01/20/23 1098

## 2023-01-20 NOTE — PROGRESS NOTES
I signed up for this patient in error. I did not participate in the care of this patient today.     Lenard Ortiz,   PGY1

## 2023-01-20 NOTE — DISCHARGE INSTRUCTIONS
You were seen in the emergency department today for knee pain. Your x-ray shows signs of arthritis but no new injury. Please follow-up with a PCP and discuss physical therapy. I also provided a referral to orthopedic surgery. Follow-up with them for further work-up and treatment. If you have any new or worsening symptoms, please return to the ED for reevaluation. Thank you for visiting [facility] Emergency Department. You need to call No primary care provider on file. to make an appointment as directed for follow up. Should you have any questions regarding your care or further treatment, please call [facility] Emergency Department at [phone]. Take any medications as prescribed, if given any, otherwise for pain Use ibuprofen or Tylenol (unless prescribed medications that have Tylenol in it). You can take over the counter Ibuprofen (advil) tablets (4 tablets every 8 hours or 3 tablets every 6 hours or 2 tablets every 4 hours)    If given narcotics during this ED visit, please do not drive or operate heavy machinery for at least 4-6 hours. PLEASE RETURN TO THE ED IMMEDIATELY for worsening symptoms, or if you develop any concerning symptoms such as: high fever not relieved by tylenol and/or motrin, chills, shortness of breath, chest pain, persistent nausea and/or vomiting, numbness, weakness or tingling in the arms or legs or change in color of the extremities, changes in mental status, persistent headache, blurry vision, inability to urinate, unable to follow up with your physician, or other any other  Care or concern.
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
## 2023-01-20 NOTE — ED PROVIDER NOTES
[facility] ED  Emergency Department Encounter  Emergency Medicine Resident     Pt Name:Suraj Conner  MRN: [id number]  [place] 1995  Date of evaluation: 1/20/23  PCP:  No primary care provider on file. Note Started: 11:27 AM EST      CHIEF COMPLAINT       Chief Complaint   Patient presents with    Knee Pain       HISTORY OF PRESENT ILLNESS  (Location/Symptom, Timing/Onset, Context/Setting, Quality, Duration, Modifying Factors, Severity.)      [place] Mac is a 32 y.o. male who presents with right knee pain. Patient reports a distant severe injury to the knee requiring multiple ligamentous repairs. He has had chronic since but it has been more severe recently. No new injury. Pain is mostly over the medial side but is diffuse around the knee. He has noticed he compensates often by leaning onto his left leg. This is causing more muscle aches and pain. He denies any weakness numbness or tingling. He has been trying heat, ice, stretching. No fevers or chills. No skin color changes. No other complaints. PAST MEDICAL / SURGICAL / SOCIAL / FAMILY HISTORY      has a past medical history of Anxiety, Depression, and History of substance abuse ([facility] [address] has a past surgical history that includes knee surgery; laparoscopic appendectomy (N/A, 01/08/2020); Humerus fracture surgery (Right, 09/22/2021); and Forearm surgery (Left, 4/12/2022).       Social History     Socioeconomic History    Marital status: Single     Spouse name: Not on file    Number of children: Not on file    Years of education: Not on file    Highest education level: Not on file   Occupational History    Not on file   Tobacco Use    Smoking status: Every Day     Packs/day: 0.25     Types: Cigarettes    Smokeless tobacco: Never   Vaping Use    Vaping Use: Former    Substances: Nicotine    Devices: Disposable   Substance and Sexual Activity    Alcohol use: Not Currently     Comment: none since 2017    Drug use: Not Currently     Types: Opiates , IV, Marijuana (Douglas Lights), Cocaine     Comment: last time 3 weeks ago crack and weed    Sexual activity: Not on file   Other Topics Concern    Not on file   Social History Narrative    Not on file     Social Determinants of Health     Financial Resource Strain: Not on file   Food Insecurity: Not on file   Transportation Needs: Not on file   Physical Activity: Not on file   Stress: Not on file   Social Connections: Not on file   Intimate Partner Violence: Not on file   Housing Stability: Not on file       Family History   Problem Relation Age of Onset    Hypertension Other        Allergies:  Shellfish-derived products    Home Medications:  Prior to Admission medications    Medication Sig Start Date End Date Taking? Authorizing Provider   ibuprofen (ADVIL;MOTRIN) 600 MG tablet TAKE ONE TABLET BY MOUTH EVERY 6 HOURS AS NEEDED FOR MILD PAIN  Patient not taking: Reported on 1/20/2023 2/24/22   Historical Provider, MD   gabapentin (NEURONTIN) 400 MG capsule Take 400 mg by mouth 3 times daily. Patient not taking: Reported on 1/20/2023    Historical Provider, MD   buprenorphine-naloxone (SUBOXONE) 8-2 MG FILM SL film Place 1 Film under the tongue daily. Patient not taking: Reported on 1/20/2023    Historical Provider, MD         REVIEW OF SYSTEMS       Review of Systems   Constitutional:  Negative for chills and fever. Eyes:  Negative for visual disturbance. Respiratory:  Negative for shortness of breath. Cardiovascular:  Negative for chest pain. Gastrointestinal:  Negative for abdominal pain, nausea and vomiting. Musculoskeletal:  Positive for arthralgias, joint swelling and myalgias. Negative for back pain and neck pain. Skin:  Negative for rash and wound. Neurological:  Negative for weakness, numbness and headaches.      PHYSICAL EXAM      INITIAL VITALS:   /78   Pulse 81   Temp 98.2 °F (36.8 °C) (Oral)   Resp 18   Ht 5' 10\" (1.778 m)   Wt 220 lb (99.8 kg)   SpO2 94% BMI 31.57 kg/m²     Physical Exam  Constitutional:       General: He is not in acute distress. Appearance: Normal appearance. He is not ill-appearing, toxic-appearing or diaphoretic. HENT:      Head: Normocephalic and atraumatic. Mouth/Throat:      Mouth: Mucous membranes are moist.      Pharynx: Oropharynx is clear. Eyes:      Extraocular Movements: Extraocular movements intact. Cardiovascular:      Rate and Rhythm: Normal rate and regular rhythm. Heart sounds: Normal heart sounds. Pulmonary:      Effort: Pulmonary effort is normal.      Breath sounds: Normal breath sounds. Abdominal:      Palpations: Abdomen is soft. Tenderness: There is no abdominal tenderness. Musculoskeletal:         General: Normal range of motion. Cervical back: Normal range of motion and neck supple. Comments: Normal range of motion of the right knee but bony prominence in the medial aspect. Tenderness palpation diffusely but more prominent over this bony aspect. No obvious laxity. Skin:     General: Skin is warm and dry. Neurological:      General: No focal deficit present. Mental Status: He is alert and oriented to person, place, and time. DDX/DIAGNOSTIC RESULTS / EMERGENCY DEPARTMENT COURSE / MDM     Medical Decision Making  29-year-old male presenting with chronic left knee pain, acutely worse over the last few days. No new injury. Patient appears well on exam, vital stable. No obvious acute injury on exam, but he does have a bony prominence that is abnormal on the medial aspect. May be related to multiple repairs he has had in the past.  Will obtain repeat x-ray. Analgesia given. Ultimately, discussed need for follow-up with orthopedic surgery in addition to PCP for possible physical therapy. Patient agreeable to this plan. Amount and/or Complexity of Data Reviewed  Radiology: ordered. Decision-making details documented in ED Course.     Risk  Prescription drug management. EKG      All EKG's are interpreted by the Emergency Department Physician who either signs or Co-signs this chart in the absence of a cardiologist.    EMERGENCY DEPARTMENT COURSE:      ED Course as of 01/21/23 0943   Fri Jan 20, 2023   1225 XR KNEE RIGHT (MIN 4 VIEWS)  IMPRESSION:  Advanced tricompartmental osteoarthropathy with trace knee effusion. [JS]      ED Course User Index  [JS] Thomas Fritz DO     PCP set up by social work. Patient has upcoming appointment to discuss physical therapy and other specialty referrals. Discharged in stable condition. PROCEDURES:      CONSULTS:  None    CRITICAL CARE:  There was significant risk of life threatening deterioration of patient's condition requiring my direct management. Critical care time  minutes, excluding any documented procedures. FINAL IMPRESSION      1.  Chronic pain of right knee          DISPOSITION / PLAN     DISPOSITION Decision To Discharge 01/20/2023 12:48:20 PM      PATIENT REFERRED TO:  [facility] ED  [address] [facility]  [phone]    If symptoms worsen    Jazmyne Laazr DO  [address] [address] [address] [facility]  [phone]    Schedule an appointment as soon as possible for a visit       DISCHARGE MEDICATIONS:  Discharge Medication List as of 1/20/2023 12:56 PM          Thomas Fritz DO  Emergency Medicine Resident    (Please note that portions of thisnote were completed with a voice recognition program.  Efforts were made to edit the dictations but occasionally words are mis-transcribed.)        Thomas Fritz DO  Resident  01/21/23 9964

## 2023-01-21 ASSESSMENT — ENCOUNTER SYMPTOMS
SHORTNESS OF BREATH: 0
BACK PAIN: 0
NAUSEA: 0
VOMITING: 0
ABDOMINAL PAIN: 0

## 2023-01-24 ENCOUNTER — TELEPHONE (OUTPATIENT)
Dept: INTERNAL MEDICINE CLINIC | Age: 28
End: 2023-01-24

## 2023-04-09 ENCOUNTER — HOSPITAL ENCOUNTER (EMERGENCY)
Age: 28
Discharge: HOME | DRG: 812 | End: 2023-04-09
Attending: EMERGENCY MEDICINE
Payer: MEDICAID

## 2023-04-09 VITALS
WEIGHT: 240 LBS | OXYGEN SATURATION: 93 % | HEIGHT: 70 IN | RESPIRATION RATE: 18 BRPM | DIASTOLIC BLOOD PRESSURE: 85 MMHG | HEART RATE: 105 BPM | TEMPERATURE: 98.4 F | BODY MASS INDEX: 34.36 KG/M2 | SYSTOLIC BLOOD PRESSURE: 158 MMHG

## 2023-04-09 DIAGNOSIS — T40.601A OPIATE OVERDOSE, ACCIDENTAL OR UNINTENTIONAL, INITIAL ENCOUNTER (HCC): Primary | ICD-10-CM

## 2023-04-09 PROCEDURE — 96374 THER/PROPH/DIAG INJ IV PUSH: CPT

## 2023-04-09 PROCEDURE — 99284 EMERGENCY DEPT VISIT MOD MDM: CPT

## 2023-04-09 PROCEDURE — 96361 HYDRATE IV INFUSION ADD-ON: CPT

## 2023-04-09 PROCEDURE — 6360000002 HC RX W HCPCS: Performed by: STUDENT IN AN ORGANIZED HEALTH CARE EDUCATION/TRAINING PROGRAM

## 2023-04-09 PROCEDURE — 2580000003 HC RX 258: Performed by: STUDENT IN AN ORGANIZED HEALTH CARE EDUCATION/TRAINING PROGRAM

## 2023-04-09 RX ORDER — ONDANSETRON 2 MG/ML
4 INJECTION INTRAMUSCULAR; INTRAVENOUS ONCE
Status: COMPLETED | OUTPATIENT
Start: 2023-04-09 | End: 2023-04-09

## 2023-04-09 RX ORDER — NALOXONE HYDROCHLORIDE 4 MG/.1ML
1 SPRAY NASAL ONCE
Status: DISCONTINUED | OUTPATIENT
Start: 2023-04-09 | End: 2023-04-09 | Stop reason: HOSPADM

## 2023-04-09 RX ORDER — 0.9 % SODIUM CHLORIDE 0.9 %
1000 INTRAVENOUS SOLUTION INTRAVENOUS ONCE
Status: COMPLETED | OUTPATIENT
Start: 2023-04-09 | End: 2023-04-09

## 2023-04-09 RX ADMIN — SODIUM CHLORIDE 1000 ML: 9 INJECTION, SOLUTION INTRAVENOUS at 06:50

## 2023-04-09 RX ADMIN — ONDANSETRON 4 MG: 2 INJECTION INTRAMUSCULAR; INTRAVENOUS at 05:44

## 2023-04-09 ASSESSMENT — PAIN DESCRIPTION - DESCRIPTORS: DESCRIPTORS: ACHING

## 2023-04-09 ASSESSMENT — PAIN SCALES - GENERAL: PAINLEVEL_OUTOF10: 9

## 2023-04-09 ASSESSMENT — PAIN DESCRIPTION - LOCATION: LOCATION: HEAD

## 2023-04-09 ASSESSMENT — ENCOUNTER SYMPTOMS
SHORTNESS OF BREATH: 0
ABDOMINAL PAIN: 0
VOMITING: 1
NAUSEA: 1

## 2023-04-09 ASSESSMENT — PAIN - FUNCTIONAL ASSESSMENT: PAIN_FUNCTIONAL_ASSESSMENT: 0-10

## 2023-04-10 ENCOUNTER — HOSPITAL ENCOUNTER (INPATIENT)
Age: 28
LOS: 1 days | Discharge: HOME | DRG: 812 | End: 2023-04-11
Attending: EMERGENCY MEDICINE | Admitting: INTERNAL MEDICINE
Payer: MEDICAID

## 2023-04-10 ENCOUNTER — APPOINTMENT (OUTPATIENT)
Dept: GENERAL RADIOLOGY | Age: 28
DRG: 812 | End: 2023-04-10
Payer: MEDICAID

## 2023-04-10 DIAGNOSIS — F19.10 POLYSUBSTANCE ABUSE (HCC): ICD-10-CM

## 2023-04-10 DIAGNOSIS — T40.601A OPIATE OVERDOSE, ACCIDENTAL OR UNINTENTIONAL, INITIAL ENCOUNTER (HCC): Primary | ICD-10-CM

## 2023-04-10 PROBLEM — R09.02 HYPOXIA: Status: ACTIVE | Noted: 2023-04-10

## 2023-04-10 PROBLEM — T40.2X1A OPIOID OVERDOSE, ACCIDENTAL OR UNINTENTIONAL, INITIAL ENCOUNTER (HCC): Status: ACTIVE | Noted: 2023-04-10

## 2023-04-10 PROBLEM — T40.604A OPIATE OVERDOSE, UNDETERMINED INTENT, INITIAL ENCOUNTER (HCC): Status: ACTIVE | Noted: 2023-04-10

## 2023-04-10 LAB
ABSOLUTE EOS #: 0.27 K/UL (ref 0–0.44)
ABSOLUTE IMMATURE GRANULOCYTE: 0.07 K/UL (ref 0–0.3)
ABSOLUTE LYMPH #: 1.15 K/UL (ref 1.1–3.7)
ABSOLUTE MONO #: 1.37 K/UL (ref 0.1–1.2)
ACETAMINOPHEN LEVEL: <5 UG/ML (ref 10–30)
AMPHETAMINE SCREEN URINE: POSITIVE
ANION GAP SERPL CALCULATED.3IONS-SCNC: 13 MMOL/L (ref 9–17)
BARBITURATE SCREEN URINE: NEGATIVE
BASOPHILS # BLD: 0 % (ref 0–2)
BASOPHILS ABSOLUTE: 0.06 K/UL (ref 0–0.2)
BENZODIAZEPINE SCREEN, URINE: NEGATIVE
BUN SERPL-MCNC: 28 MG/DL (ref 6–20)
CALCIUM SERPL-MCNC: 9.4 MG/DL (ref 8.6–10.4)
CANNABINOID SCREEN URINE: NEGATIVE
CHLORIDE SERPL-SCNC: 99 MMOL/L (ref 98–107)
CHP ED QC CHECK: NORMAL
CO2 SERPL-SCNC: 20 MMOL/L (ref 20–31)
COCAINE METABOLITE, URINE: POSITIVE
CREAT SERPL-MCNC: 1.08 MG/DL (ref 0.7–1.2)
EOSINOPHILS RELATIVE PERCENT: 2 % (ref 1–4)
ETHANOL PERCENT: <0.01 %
ETHANOL: <10 MG/DL
FENTANYL URINE: POSITIVE
GFR SERPL CREATININE-BSD FRML MDRD: >60 ML/MIN/1.73M2
GLUCOSE BLD-MCNC: 133 MG/DL
GLUCOSE BLD-MCNC: 133 MG/DL (ref 75–110)
GLUCOSE SERPL-MCNC: 136 MG/DL (ref 70–99)
HCT VFR BLD AUTO: 42.7 % (ref 40.7–50.3)
HGB BLD-MCNC: 14.5 G/DL (ref 13–17)
IMMATURE GRANULOCYTES: 1 %
LYMPHOCYTES # BLD: 8 % (ref 24–43)
MCH RBC QN AUTO: 30.4 PG (ref 25.2–33.5)
MCHC RBC AUTO-ENTMCNC: 34 G/DL (ref 28.4–34.8)
MCV RBC AUTO: 89.5 FL (ref 82.6–102.9)
METHADONE SCREEN, URINE: NEGATIVE
MONOCYTES # BLD: 10 % (ref 3–12)
NRBC AUTOMATED: 0 PER 100 WBC
OPIATES, URINE: POSITIVE
OXYCODONE SCREEN URINE: NEGATIVE
PDW BLD-RTO: 12.4 % (ref 11.8–14.4)
PHENCYCLIDINE, URINE: NEGATIVE
PLATELET # BLD AUTO: 229 K/UL (ref 138–453)
PMV BLD AUTO: 10.3 FL (ref 8.1–13.5)
POTASSIUM SERPL-SCNC: 4.6 MMOL/L (ref 3.7–5.3)
RBC # BLD: 4.77 M/UL (ref 4.21–5.77)
SALICYLATE LEVEL: <1 MG/DL (ref 3–10)
SEG NEUTROPHILS: 79 % (ref 36–65)
SEGMENTED NEUTROPHILS ABSOLUTE COUNT: 11.47 K/UL (ref 1.5–8.1)
SODIUM SERPL-SCNC: 132 MMOL/L (ref 135–144)
TEST INFORMATION: ABNORMAL
TOXIC TRICYCLIC SC,BLOOD: NEGATIVE
WBC # BLD AUTO: 14.4 K/UL (ref 3.5–11.3)

## 2023-04-10 PROCEDURE — 6360000002 HC RX W HCPCS: Performed by: STUDENT IN AN ORGANIZED HEALTH CARE EDUCATION/TRAINING PROGRAM

## 2023-04-10 PROCEDURE — 80143 DRUG ASSAY ACETAMINOPHEN: CPT

## 2023-04-10 PROCEDURE — 6360000002 HC RX W HCPCS

## 2023-04-10 PROCEDURE — 2580000003 HC RX 258: Performed by: INTERNAL MEDICINE

## 2023-04-10 PROCEDURE — 2580000003 HC RX 258

## 2023-04-10 PROCEDURE — 80307 DRUG TEST PRSMV CHEM ANLYZR: CPT

## 2023-04-10 PROCEDURE — 80048 BASIC METABOLIC PNL TOTAL CA: CPT

## 2023-04-10 PROCEDURE — 99222 1ST HOSP IP/OBS MODERATE 55: CPT | Performed by: STUDENT IN AN ORGANIZED HEALTH CARE EDUCATION/TRAINING PROGRAM

## 2023-04-10 PROCEDURE — 2580000003 HC RX 258: Performed by: STUDENT IN AN ORGANIZED HEALTH CARE EDUCATION/TRAINING PROGRAM

## 2023-04-10 PROCEDURE — 93005 ELECTROCARDIOGRAM TRACING: CPT

## 2023-04-10 PROCEDURE — 96376 TX/PRO/DX INJ SAME DRUG ADON: CPT

## 2023-04-10 PROCEDURE — 87641 MR-STAPH DNA AMP PROBE: CPT

## 2023-04-10 PROCEDURE — 80179 DRUG ASSAY SALICYLATE: CPT

## 2023-04-10 PROCEDURE — 82947 ASSAY GLUCOSE BLOOD QUANT: CPT

## 2023-04-10 PROCEDURE — 99285 EMERGENCY DEPT VISIT HI MDM: CPT

## 2023-04-10 PROCEDURE — 96375 TX/PRO/DX INJ NEW DRUG ADDON: CPT

## 2023-04-10 PROCEDURE — G0480 DRUG TEST DEF 1-7 CLASSES: HCPCS

## 2023-04-10 PROCEDURE — 85025 COMPLETE CBC W/AUTO DIFF WBC: CPT

## 2023-04-10 PROCEDURE — 96374 THER/PROPH/DIAG INJ IV PUSH: CPT

## 2023-04-10 PROCEDURE — 99291 CRITICAL CARE FIRST HOUR: CPT | Performed by: INTERNAL MEDICINE

## 2023-04-10 PROCEDURE — 2000000000 HC ICU R&B

## 2023-04-10 PROCEDURE — 71045 X-RAY EXAM CHEST 1 VIEW: CPT

## 2023-04-10 RX ORDER — DIPHENHYDRAMINE HYDROCHLORIDE 50 MG/ML
50 INJECTION INTRAMUSCULAR; INTRAVENOUS ONCE
Status: COMPLETED | OUTPATIENT
Start: 2023-04-10 | End: 2023-04-10

## 2023-04-10 RX ORDER — POLYETHYLENE GLYCOL 3350 17 G/17G
17 POWDER, FOR SOLUTION ORAL DAILY PRN
Status: DISCONTINUED | OUTPATIENT
Start: 2023-04-10 | End: 2023-04-10

## 2023-04-10 RX ORDER — NALOXONE HYDROCHLORIDE 1 MG/ML
2 INJECTION INTRAMUSCULAR; INTRAVENOUS; SUBCUTANEOUS PRN
Status: DISCONTINUED | OUTPATIENT
Start: 2023-04-10 | End: 2023-04-11 | Stop reason: HOSPADM

## 2023-04-10 RX ORDER — SODIUM CHLORIDE 9 MG/ML
INJECTION, SOLUTION INTRAVENOUS CONTINUOUS
Status: ACTIVE | OUTPATIENT
Start: 2023-04-10 | End: 2023-04-11

## 2023-04-10 RX ORDER — ACETAMINOPHEN 325 MG/1
650 TABLET ORAL EVERY 6 HOURS PRN
Status: DISCONTINUED | OUTPATIENT
Start: 2023-04-10 | End: 2023-04-11 | Stop reason: HOSPADM

## 2023-04-10 RX ORDER — NALOXONE HYDROCHLORIDE 1 MG/ML
INJECTION INTRAMUSCULAR; INTRAVENOUS; SUBCUTANEOUS
Status: COMPLETED
Start: 2023-04-10 | End: 2023-04-10

## 2023-04-10 RX ORDER — NALOXONE HYDROCHLORIDE 1 MG/ML
INJECTION INTRAMUSCULAR; INTRAVENOUS; SUBCUTANEOUS
Status: DISPENSED
Start: 2023-04-10 | End: 2023-04-11

## 2023-04-10 RX ORDER — ONDANSETRON 4 MG/1
4 TABLET, ORALLY DISINTEGRATING ORAL EVERY 8 HOURS PRN
Status: DISCONTINUED | OUTPATIENT
Start: 2023-04-10 | End: 2023-04-10

## 2023-04-10 RX ORDER — ONDANSETRON 4 MG/1
4 TABLET, ORALLY DISINTEGRATING ORAL EVERY 8 HOURS PRN
Status: DISCONTINUED | OUTPATIENT
Start: 2023-04-10 | End: 2023-04-11 | Stop reason: HOSPADM

## 2023-04-10 RX ORDER — ONDANSETRON 2 MG/ML
4 INJECTION INTRAMUSCULAR; INTRAVENOUS EVERY 6 HOURS PRN
Status: DISCONTINUED | OUTPATIENT
Start: 2023-04-10 | End: 2023-04-10

## 2023-04-10 RX ORDER — ACETAMINOPHEN 325 MG/1
650 TABLET ORAL EVERY 6 HOURS PRN
Status: DISCONTINUED | OUTPATIENT
Start: 2023-04-10 | End: 2023-04-10

## 2023-04-10 RX ORDER — NALOXONE HYDROCHLORIDE 0.4 MG/ML
0.4 INJECTION, SOLUTION INTRAMUSCULAR; INTRAVENOUS; SUBCUTANEOUS ONCE
Status: COMPLETED | OUTPATIENT
Start: 2023-04-10 | End: 2023-04-10

## 2023-04-10 RX ORDER — ENOXAPARIN SODIUM 100 MG/ML
40 INJECTION SUBCUTANEOUS DAILY
Status: DISCONTINUED | OUTPATIENT
Start: 2023-04-10 | End: 2023-04-11 | Stop reason: HOSPADM

## 2023-04-10 RX ORDER — SODIUM CHLORIDE 0.9 % (FLUSH) 0.9 %
5-40 SYRINGE (ML) INJECTION EVERY 12 HOURS SCHEDULED
Status: DISCONTINUED | OUTPATIENT
Start: 2023-04-10 | End: 2023-04-11 | Stop reason: HOSPADM

## 2023-04-10 RX ORDER — POLYETHYLENE GLYCOL 3350 17 G/17G
17 POWDER, FOR SOLUTION ORAL DAILY PRN
Status: DISCONTINUED | OUTPATIENT
Start: 2023-04-10 | End: 2023-04-11 | Stop reason: HOSPADM

## 2023-04-10 RX ORDER — NALOXONE HYDROCHLORIDE 1 MG/ML
2 INJECTION INTRAMUSCULAR; INTRAVENOUS; SUBCUTANEOUS ONCE
Status: COMPLETED | OUTPATIENT
Start: 2023-04-10 | End: 2023-04-10

## 2023-04-10 RX ORDER — ENOXAPARIN SODIUM 100 MG/ML
40 INJECTION SUBCUTANEOUS DAILY
Status: DISCONTINUED | OUTPATIENT
Start: 2023-04-10 | End: 2023-04-10

## 2023-04-10 RX ORDER — SODIUM CHLORIDE 0.9 % (FLUSH) 0.9 %
5-40 SYRINGE (ML) INJECTION PRN
Status: DISCONTINUED | OUTPATIENT
Start: 2023-04-10 | End: 2023-04-11 | Stop reason: HOSPADM

## 2023-04-10 RX ORDER — ONDANSETRON 2 MG/ML
4 INJECTION INTRAMUSCULAR; INTRAVENOUS EVERY 6 HOURS PRN
Status: DISCONTINUED | OUTPATIENT
Start: 2023-04-10 | End: 2023-04-11 | Stop reason: HOSPADM

## 2023-04-10 RX ORDER — SODIUM CHLORIDE 9 MG/ML
INJECTION, SOLUTION INTRAVENOUS PRN
Status: DISCONTINUED | OUTPATIENT
Start: 2023-04-10 | End: 2023-04-10

## 2023-04-10 RX ORDER — SODIUM CHLORIDE 0.9 % (FLUSH) 0.9 %
5-40 SYRINGE (ML) INJECTION PRN
Status: DISCONTINUED | OUTPATIENT
Start: 2023-04-10 | End: 2023-04-10

## 2023-04-10 RX ORDER — LORAZEPAM 2 MG/ML
2 INJECTION INTRAMUSCULAR ONCE
Status: DISCONTINUED | OUTPATIENT
Start: 2023-04-10 | End: 2023-04-10

## 2023-04-10 RX ORDER — SODIUM CHLORIDE 0.9 % (FLUSH) 0.9 %
5-40 SYRINGE (ML) INJECTION EVERY 12 HOURS SCHEDULED
Status: DISCONTINUED | OUTPATIENT
Start: 2023-04-10 | End: 2023-04-10

## 2023-04-10 RX ORDER — ONDANSETRON 2 MG/ML
4 INJECTION INTRAMUSCULAR; INTRAVENOUS ONCE
Status: COMPLETED | OUTPATIENT
Start: 2023-04-10 | End: 2023-04-10

## 2023-04-10 RX ORDER — HALOPERIDOL 5 MG/ML
5 INJECTION INTRAMUSCULAR ONCE
Status: COMPLETED | OUTPATIENT
Start: 2023-04-10 | End: 2023-04-10

## 2023-04-10 RX ORDER — ACETAMINOPHEN 650 MG/1
650 SUPPOSITORY RECTAL EVERY 6 HOURS PRN
Status: DISCONTINUED | OUTPATIENT
Start: 2023-04-10 | End: 2023-04-11 | Stop reason: HOSPADM

## 2023-04-10 RX ORDER — 0.9 % SODIUM CHLORIDE 0.9 %
1000 INTRAVENOUS SOLUTION INTRAVENOUS ONCE
Status: COMPLETED | OUTPATIENT
Start: 2023-04-10 | End: 2023-04-10

## 2023-04-10 RX ORDER — SODIUM CHLORIDE 9 MG/ML
INJECTION, SOLUTION INTRAVENOUS PRN
Status: DISCONTINUED | OUTPATIENT
Start: 2023-04-10 | End: 2023-04-11 | Stop reason: HOSPADM

## 2023-04-10 RX ORDER — ACETAMINOPHEN 650 MG/1
650 SUPPOSITORY RECTAL EVERY 6 HOURS PRN
Status: DISCONTINUED | OUTPATIENT
Start: 2023-04-10 | End: 2023-04-10

## 2023-04-10 RX ADMIN — NALOXONE HYDROCHLORIDE 0.4 MG: 0.4 INJECTION, SOLUTION INTRAMUSCULAR; INTRAVENOUS; SUBCUTANEOUS at 10:59

## 2023-04-10 RX ADMIN — SODIUM CHLORIDE 1000 ML: 9 INJECTION, SOLUTION INTRAVENOUS at 14:06

## 2023-04-10 RX ADMIN — HALOPERIDOL LACTATE 5 MG: 5 INJECTION, SOLUTION INTRAMUSCULAR at 16:07

## 2023-04-10 RX ADMIN — NALOXONE HYDROCHLORIDE 0.4 MG: 0.4 INJECTION, SOLUTION INTRAMUSCULAR; INTRAVENOUS; SUBCUTANEOUS at 11:14

## 2023-04-10 RX ADMIN — NALOXONE HYDROCHLORIDE 0.4 MG: 0.4 INJECTION, SOLUTION INTRAMUSCULAR; INTRAVENOUS; SUBCUTANEOUS at 12:13

## 2023-04-10 RX ADMIN — NALOXONE HYDROCHLORIDE 2 MG: 1 INJECTION PARENTERAL at 14:06

## 2023-04-10 RX ADMIN — DIPHENHYDRAMINE HYDROCHLORIDE 50 MG: 50 INJECTION, SOLUTION INTRAMUSCULAR; INTRAVENOUS at 16:07

## 2023-04-10 RX ADMIN — SODIUM CHLORIDE: 9 INJECTION, SOLUTION INTRAVENOUS at 17:53

## 2023-04-10 RX ADMIN — NALOXONE HYDROCHLORIDE 0.25 MG/HR: 1 INJECTION PARENTERAL at 16:08

## 2023-04-10 RX ADMIN — ONDANSETRON 4 MG: 2 INJECTION INTRAMUSCULAR; INTRAVENOUS at 11:00

## 2023-04-10 RX ADMIN — SODIUM CHLORIDE, PRESERVATIVE FREE 10 ML: 5 INJECTION INTRAVENOUS at 21:39

## 2023-04-10 ASSESSMENT — PAIN SCALES - GENERAL: PAINLEVEL_OUTOF10: 7

## 2023-04-10 ASSESSMENT — PAIN - FUNCTIONAL ASSESSMENT: PAIN_FUNCTIONAL_ASSESSMENT: 0-10

## 2023-04-10 NOTE — ED NOTES
Writer received call from Mane Cline who stated she's assisting [facility] today. Levon Teran stated they sent patient to the ED due to respiratory distress, bizarre/out of control behavior (to include howling) & seeming very high. Levon Teran stated they're concerned for possible stimulant or polysubstance use.  eLvon Teran stated if patient's medically cleared, he's welcome back to [facility] which stops taking people at [address], [address]  04/10/23 1040

## 2023-04-10 NOTE — ED NOTES
Patient refusing narcan drip. Patient agitated and pacing.  Patient still has 2L NC on     Chiquis Chandra RN  04/10/23 2239

## 2023-04-10 NOTE — H&P
Metabolic Panel    Collection Time: 04/10/23 12:48 PM   Result Value Ref Range    Glucose 136 (H) 70 - 99 mg/dL    BUN 28 (H) 6 - 20 mg/dL    Creatinine 1.08 0.70 - 1.20 mg/dL    Est, Glom Filt Rate >60 >60 mL/min/1.73m2    Calcium 9.4 8.6 - 10.4 mg/dL    Sodium 132 (L) 135 - 144 mmol/L    Potassium 4.6 3.7 - 5.3 mmol/L    Chloride 99 98 - 107 mmol/L    CO2 20 20 - 31 mmol/L    Anion Gap 13 9 - 17 mmol/L   CBC with Auto Differential    Collection Time: 04/10/23 12:48 PM   Result Value Ref Range    WBC 14.4 (H) 3.5 - 11.3 k/uL    RBC 4.77 4.21 - 5.77 m/uL    Hemoglobin 14.5 13.0 - 17.0 g/dL    Hematocrit 42.7 40.7 - 50.3 %    MCV 89.5 82.6 - 102.9 fL    MCH 30.4 25.2 - 33.5 pg    MCHC 34.0 28.4 - 34.8 g/dL    RDW 12.4 11.8 - 14.4 %    Platelets 903 014 - 219 k/uL    MPV 10.3 8.1 - 13.5 fL    NRBC Automated 0.0 0.0 per 100 WBC    Seg Neutrophils 79 (H) 36 - 65 %    Lymphocytes 8 (L) 24 - 43 %    Monocytes 10 3 - 12 %    Eosinophils % 2 1 - 4 %    Basophils 0 0 - 2 %    Immature Granulocytes 1 (H) 0 %    Segs Absolute 11.47 (H) 1.50 - 8.10 k/uL    Absolute Lymph # 1.15 1.10 - 3.70 k/uL    Absolute Mono # 1.37 (H) 0.10 - 1.20 k/uL    Absolute Eos # 0.27 0.00 - 0.44 k/uL    Basophils Absolute 0.06 0.00 - 0.20 k/uL    Absolute Immature Granulocyte 0.07 0.00 - 0.30 k/uL   TOX SCR, BLD, ED    Collection Time: 04/10/23 12:48 PM   Result Value Ref Range    Acetaminophen Level <5 (L) 10 - 30 ug/mL    Ethanol <10 <10 mg/dL    Ethanol percent <5.706 <0.787 %    Salicylate Lvl <1 (L) 3 - 10 mg/dL    Toxic Tricyclic Sc,Blood NEGATIVE NEGATIVE   POC Glucose Fingerstick    Collection Time: 04/10/23 12:48 PM   Result Value Ref Range    POC Glucose 133 (H) 75 - 110 mg/dL   POCT glucose    Collection Time: 04/10/23 12:55 PM   Result Value Ref Range    Glucose 133 mg/dL    QC OK? ok        Imaging/Diagnostics:  XR CHEST PORTABLE    Result Date: 4/10/2023  No acute cardiopulmonary disease.        Assessment :      Hospital Problems
Marijuana use    Opiate overdose, undetermined intent, initial encounter ([facility] [address]     25 yoM here for fentanyl overdose requiring narcan drip with need for supplemental nasal cannula for intermittent hypoxia with waxing and waning mentation. PLAN/MEDICAL DECISION MAKING:  Neurologic:   Neuro intact. A&Ox3. However has waxing and waning mentation. Reports of being uncooperative requiring haldol in the ED. Neuro checks per protocol  Avoid benzo's in setting of opioid withdrawal  Cardiovascular:  Hemodynamically stable. No pressors  MAP goal >65  Continue Narcan drip here in ICU  EKG in ED with QTc 434 ms  Pulmonary:  Maintain oxygen sats >92%  Pulmonary toilet  Wean nasal cannula as able. GI/Nutrition  Diet:Diet NPO. Will give diet once more awake consistently   Zofran prn for nausea in setting of opioid withdrawal  Renal/Fluid/Electrolyte  IV Fluids: normal saline 100 mL/hr for 10 hours due to diaphoresis on arrival along with NPO status  I/O: No intake/output data recorded. Monitor UOP and I's & O's  Monitor electrolytes, replace PRN. Mildly hyponatremic at 132. Renal function normal  ID  WBC:   Lab Results   Component Value Date    WBC 14.4 (H) 04/10/2023   Mild leukocytosis likely reactive  Afebrile. No concerns for active infection/antibiotics  Hematology:  Recent Labs     04/10/23  1248   HGB 14.5     No signs of bleed, and stable  Endocrine:   glucose controlled - most recent blood glucose level is   Recent Labs     04/10/23  1248 04/10/23  1255   GLUCOSE 136* 133     DVT Prophylaxis  lovenox  Discharge Needs:   social work once closer to discharge from hospital       CODE STATUS: Full Code    DISPOSITION:  [x] To remain ICU: likely overnight to monitor respiratory status while on narcan drip.  Once off drip and satting well on room air with consistently normal mentation, plan for transfer out of ICU  [] OK for out of ICU from [address] Donell Donaldson DO  Emergency Medicine Residency

## 2023-04-10 NOTE — ED PROVIDER NOTES
[address] [facility]     Emergency Department     Faculty Attestation    I performed a history and physical examination of the patient and discussed management with the resident. I reviewed the resident´s note and agree with the documented findings and plan of care. Any areas of disagreement are noted on the chart. I was personally present for the key portions of any procedures. I have documented in the chart those procedures where I was not present during the key portions. I have reviewed the emergency nurses triage note. I agree with the chief complaint, past medical history, past surgical history, allergies, medications, social and family history as documented unless otherwise noted below. For Physician Assistant/ Nurse Practitioner cases/documentation I have personally evaluated this patient and have completed at least one if not all key elements of the E/M (history, physical exam, and MDM). Additional findings are as noted. Pulse 1 mm with shallow respirations, responded to Narcan. Marylou Brito MD  04/10/23 1101       EKG Interpretation    Interpreted by emergency department physician    Rhythm: normal sinus   Rate: normal/98  Axis: normal/35  Ectopy: none  Conduction: normal  ST Segments: no acute change  T Waves: no acute change  Q Waves: none    Clinical Impression: Normal EKG    Salvador Maloney MD  04/10/23 1102    Patient continues to be lethargic after responding to multiple doses of Narcan. Marylou Brito MD  04/10/23 1251    Patient refused Narcan drip, patient is easily awakened. I asked patient why he is so drowsy and what he took today and he states that he has not slept for 4 days.        Marylou Brito MD  04/10/23 1400
[facility]  Emergency Department  Faculty Sign-Out Addendum     Care of Fbarizio Pereyra was assumed from previous attending and is being seen for Withdrawal  .  The patient's initial evaluation and plan have been discussed with the prior provider who initially evaluated the patient. Handoff taken on the following patient from prior Attending Physician:    Malathi De La Paz    I was available and discussed any additional care issues that arose and coordinated the management plans with the resident(s) caring for the patient during my duty period. Any areas of disagreement with residents documentation of care or procedures are noted on the chart. I was personally present for the key portions of any/all procedures during my duty period. I have documented in the chart those procedures where I was not present during the key portions.       EMERGENCY DEPARTMENT COURSE / MEDICAL DECISION MAKING:       MEDICATIONS GIVEN:  Orders Placed This Encounter   Medications    naloxone (NARCAN) injection 0.4 mg    ondansetron (ZOFRAN) injection 4 mg    naloxone (NARCAN) 2 MG/2ML injection     Mukesh Revering: cabinet override    naloxone (NARCAN) injection 0.4 mg    naloxone (NARCAN) injection 0.4 mg    naloxone (NARCAN) 2 MG/2ML injection     Mukesh Revering: cabinet override    naloxone (NARCAN) 4 mg in sodium chloride 0.9 % 250 mL infusion    naloxone (NARCAN) injection 2 mg    0.9 % sodium chloride bolus    naloxone (NARCAN) injection 2 mg    sodium chloride flush 0.9 % injection 5-40 mL    DISCONTD: sodium chloride flush 0.9 % injection 5-40 mL    0.9 % sodium chloride infusion    enoxaparin (LOVENOX) injection 40 mg     Order Specific Question:   Indication of Use     Answer:   Prophylaxis-DVT/PE    DISCONTD: ondansetron (ZOFRAN-ODT) disintegrating tablet 4 mg    DISCONTD: ondansetron (ZOFRAN) injection 4 mg    DISCONTD: polyethylene glycol (GLYCOLAX) packet 17 g    DISCONTD: acetaminophen (TYLENOL) tablet 650 mg
deterioration of patient's condition requiring my direct management. Critical care time 60 minutes, excluding any documented procedures. FINAL IMPRESSION      1. Opiate overdose, accidental or unintentional, initial encounter ([facility] [address]    2. Polysubstance abuse ([facility] [address]          DISPOSITION / PLAN     DISPOSITION Admitted 04/10/2023 04:32:32 PM      PATIENT REFERRED TO:  No follow-up provider specified.     DISCHARGE MEDICATIONS:  New Prescriptions    No medications on file       Almaz Tripp DO  Emergency Medicine Resident    (Please note that portions of thisnote were completed with a voice recognition program.  Efforts were made to edit the dictations but occasionally words are mis-transcribed.)       Gisell Michele DO  Resident  04/10/23 0005

## 2023-04-10 NOTE — DISCHARGE INSTRUCTIONS
-You were seen and evaluated by emergency medicine physicians at [facility].    -Please follow-up with your primary care physician and/or with the referrals to specialist.    -You were diagnosed with: Opiate overdose, polysubstance abuse    -You received 2 doses of Narcan and 1 of Zofran for concern of opiate overdose. Following the second dose you return to a normal oxygen saturation and your respiratory rate improved to the mid teens. On multiple reassessments you are protecting her airway and breathing comfortably. You were able to wake up to stimulation without any other symptoms of withdrawal.    -Please refrain from using illicit narcotics such as opiates or cocaine. These put your life at risk    -Please return to the Emergency Department if you are experiencing the following symptoms acutely: Headache, fever, chills, nausea, vomiting, chest pain, shortness of breath, abdominal pain, change with urination, change with bowel movements, change in your skin/hair/nail, weakness, fatigue, altered mental status and/or any change from baseline health.     -Thank you for coming to [facility].

## 2023-04-10 NOTE — ED NOTES
Patient registered with the social security number given to nurse. Patient verified identity with 2 identifying factors.        Yahaira Kramer RN  04/10/23 1047

## 2023-04-10 NOTE — ED NOTES
Dr. Esthela Joy notified of patient's continued lethargy and bradynea.       Marky Wasserman RN  04/10/23 1200

## 2023-04-10 NOTE — ED NOTES
Pt presents to the ED with c/o withdrawal. Patient states he uses fentanyl and cocaine and was at the [facility] yesterday where they instructed he be seen here due to his withdrawal symptoms. Patient states his last use of fentanyl/cocaine was four days ago. Patient denies any use of drugs today. Patient is alert and oriented x4, answering questions appropriately. Patient is changed into a gown, placed on cardiac monitor, EKG done, IV established, will continue to monitor. On arrival, patient lethargic, difficult to arouse, arousable to verbal stimuli, patient desat to upper 80's when asleep. Patient's pupils pinpoint. Patient placed on 3L nasal cannula. Dr. Charline Smiley at bedside assessing patient.         Becky Rabago RN  04/10/23 4458

## 2023-04-10 NOTE — ED NOTES
Pt continuing to desat without O2, placed back on 2L nasal cannula, patient alert and oriented x4. Dr. Nunez Press notified and at bedside.         Dajuan Sarabia RN  04/10/23 6486

## 2023-04-10 NOTE — ED NOTES
Patient resting comfortably, even and non-labored RR about 12 respirations per minute, will continue to monitor.       Pat Grajeda RN  04/10/23 0936

## 2023-04-11 VITALS
HEIGHT: 70 IN | RESPIRATION RATE: 11 BRPM | HEART RATE: 70 BPM | DIASTOLIC BLOOD PRESSURE: 79 MMHG | OXYGEN SATURATION: 93 % | BODY MASS INDEX: 24.84 KG/M2 | WEIGHT: 173.5 LBS | SYSTOLIC BLOOD PRESSURE: 128 MMHG | TEMPERATURE: 98 F

## 2023-04-11 PROBLEM — T40.601A OPIATE OVERDOSE (HCC): Status: ACTIVE | Noted: 2023-04-10

## 2023-04-11 LAB
ABSOLUTE EOS #: 0.44 K/UL (ref 0–0.44)
ABSOLUTE IMMATURE GRANULOCYTE: 0.03 K/UL (ref 0–0.3)
ABSOLUTE LYMPH #: 1.53 K/UL (ref 1.1–3.7)
ABSOLUTE MONO #: 1.02 K/UL (ref 0.1–1.2)
ANION GAP SERPL CALCULATED.3IONS-SCNC: 5 MMOL/L (ref 9–17)
BASOPHILS # BLD: 0 % (ref 0–2)
BASOPHILS ABSOLUTE: 0.03 K/UL (ref 0–0.2)
BUN SERPL-MCNC: 15 MG/DL (ref 6–20)
CALCIUM SERPL-MCNC: 8.2 MG/DL (ref 8.6–10.4)
CHLORIDE SERPL-SCNC: 104 MMOL/L (ref 98–107)
CO2 SERPL-SCNC: 26 MMOL/L (ref 20–31)
CREAT SERPL-MCNC: 0.9 MG/DL (ref 0.7–1.2)
EKG ATRIAL RATE: 98 BPM
EKG P AXIS: 56 DEGREES
EKG P-R INTERVAL: 138 MS
EKG Q-T INTERVAL: 340 MS
EKG QRS DURATION: 92 MS
EKG QTC CALCULATION (BAZETT): 434 MS
EKG R AXIS: 35 DEGREES
EKG T AXIS: 45 DEGREES
EKG VENTRICULAR RATE: 98 BPM
EOSINOPHILS RELATIVE PERCENT: 4 % (ref 1–4)
GFR SERPL CREATININE-BSD FRML MDRD: >60 ML/MIN/1.73M2
GLUCOSE SERPL-MCNC: 104 MG/DL (ref 70–99)
HCT VFR BLD AUTO: 37.6 % (ref 40.7–50.3)
HGB BLD-MCNC: 12.8 G/DL (ref 13–17)
IMMATURE GRANULOCYTES: 0 %
LYMPHOCYTES # BLD: 15 % (ref 24–43)
MCH RBC QN AUTO: 30.5 PG (ref 25.2–33.5)
MCHC RBC AUTO-ENTMCNC: 34 G/DL (ref 28.4–34.8)
MCV RBC AUTO: 89.7 FL (ref 82.6–102.9)
MONOCYTES # BLD: 10 % (ref 3–12)
MRSA, DNA, NASAL: NEGATIVE
NRBC AUTOMATED: 0 PER 100 WBC
PDW BLD-RTO: 12.7 % (ref 11.8–14.4)
PLATELET # BLD AUTO: 181 K/UL (ref 138–453)
PMV BLD AUTO: 10.1 FL (ref 8.1–13.5)
POTASSIUM SERPL-SCNC: 4 MMOL/L (ref 3.7–5.3)
RBC # BLD: 4.19 M/UL (ref 4.21–5.77)
SEG NEUTROPHILS: 71 % (ref 36–65)
SEGMENTED NEUTROPHILS ABSOLUTE COUNT: 7.27 K/UL (ref 1.5–8.1)
SODIUM SERPL-SCNC: 135 MMOL/L (ref 135–144)
SPECIMEN DESCRIPTION: NORMAL
WBC # BLD AUTO: 10.3 K/UL (ref 3.5–11.3)

## 2023-04-11 PROCEDURE — 99239 HOSP IP/OBS DSCHRG MGMT >30: CPT | Performed by: INTERNAL MEDICINE

## 2023-04-11 PROCEDURE — 2580000003 HC RX 258: Performed by: INTERNAL MEDICINE

## 2023-04-11 PROCEDURE — 51798 US URINE CAPACITY MEASURE: CPT

## 2023-04-11 PROCEDURE — 80048 BASIC METABOLIC PNL TOTAL CA: CPT

## 2023-04-11 PROCEDURE — 85025 COMPLETE CBC W/AUTO DIFF WBC: CPT

## 2023-04-11 PROCEDURE — 93010 ELECTROCARDIOGRAM REPORT: CPT | Performed by: INTERNAL MEDICINE

## 2023-04-11 PROCEDURE — 36415 COLL VENOUS BLD VENIPUNCTURE: CPT

## 2023-04-11 RX ADMIN — SODIUM CHLORIDE: 9 INJECTION, SOLUTION INTRAVENOUS at 04:05

## 2023-04-11 RX ADMIN — SODIUM CHLORIDE, PRESERVATIVE FREE 10 ML: 5 INJECTION INTRAVENOUS at 08:00

## 2023-04-11 ASSESSMENT — PAIN SCALES - GENERAL: PAINLEVEL_OUTOF10: 0

## 2023-04-11 NOTE — PLAN OF CARE
Problem: Discharge Planning  Goal: Discharge to home or other facility with appropriate resources  4/11/2023 0953 by Maddie Zhong RN  Outcome: Progressing  4/10/2023 2214 by Teresa Cee RN  Outcome: Progressing     Problem: Pain  Goal: Verbalizes/displays adequate comfort level or baseline comfort level  4/11/2023 0953 by Maddie Zhong RN  Outcome: Progressing  4/10/2023 2214 by Teresa Cee RN  Outcome: Progressing     Problem: Safety - Adult  Goal: Free from fall injury  4/11/2023 0953 by Maddie Zhong RN  Outcome: Progressing  4/10/2023 2214 by Teresa Cee RN  Outcome: Progressing     Problem: Skin/Tissue Integrity  Goal: Absence of new skin breakdown  Description: 1. Monitor for areas of redness and/or skin breakdown  2. Assess vascular access sites hourly  3. Every 4-6 hours minimum:  Change oxygen saturation probe site  4. Every 4-6 hours:  If on nasal continuous positive airway pressure, respiratory therapy assess nares and determine need for appliance change or resting period.   Outcome: Progressing

## 2023-04-11 NOTE — PROGRESS NOTES
INTENSIVE CARE UNIT  Resident Physician Progress Note    Patient - Danie Serrano  Date of Admission -  4/10/2023 10:44 AM  Date of Evaluation -  2023  Room and Bed Number -  2561/7298-20   Hospital Day - 1    HPI:     Danie Serrano is a 25 y.o. male with PMH including substance abuse, anxiety, and depression who presents to the ED after taking \"10 tabs of fentanyl\" which is quantified as more than his normal \"bump per day. \" He was requiring hourly dosing of Narcan in the ED. He is A&Ox3 for me in the ED, however frequently becoming hypoxic when dozing off with sats in the mid 80's. He is not able to tell me why he needs the oxygen, but is able alert to person, place, time, and president. Minimal history due to mild confusion and conversational dyspnea. He is unable to tell me why he took more fentanyl than his baseline. Per chart review, he was sent [facility] the ED from the [facility] due to respiratory distress and \"bizarre behavior. \"      While in the ED, acetaminophen and salicylate levels unremarkable. Mild hyponatremia and leukocytosis. Otherwise unremarkable labs. CXR unremarkable. Initially required NRB, but titrated down to nasal cannula    SUBJECTIVE:     OVERNIGHT EVENTS:    no acute events overnight    TODAY:  remains on 2L NC early this morning and on narcan gtt.  Will attempt to discontinue the narcan gtt and wean NC    AWAKE & FOLLOWING COMMANDS:  [] No   [x] Yes    SECRETIONS Amount:  [x] Small [] Moderate  [] Large  [] None  Color:     [] White [] Colored  [] Bloody    SEDATION:  RAAS Score:  [] Propofol gtt  [] Versed gtt  [] Ativan gtt   [x] No Sedation    PARALYZED:  [x] No    [] Yes    VASOPRESSORS:  [x] No    [] Yes  [] Levophed [] Dopamine [] Vasopressin  [] Dobutamine [] Phenylephrine [] Epinephrine      OBJECTIVE:     VITAL SIGNS:  /64   Pulse 80   Temp 97.9 °F (36.6 °C) (Axillary)   Resp 10   SpO2 95%   Tmax over 24 hours:  Temp (24hrs), Av.9 °F (36.6 °C), Min:97.2 °F

## 2023-04-11 NOTE — DISCHARGE SUMMARY
[facility]     Department of Internal Medicine - Critical Care Service    INPATIENT DISCHARGE SUMMARY      PATIENT IDENTIFICATION:  NAME:  Cintia Jefferson   :   1995  MRN:    [id number]     Acct:    [de-identified]   Admit Date:  4/10/2023  Discharge date:  No discharge date for patient encounter. Attending Provider: Luis Enrique Singh MD                                     Principal Problem:    Opiate overdose, undetermined intent, initial encounter [facility])  Active Problems:    Cocaine use    Marijuana use    Hypoxia    Opioid overdose, accidental or unintentional, initial encounter [facility])  Resolved Problems:    * No resolved hospital problems. *       REASON FOR HOSPITALIZATION:   Chief Complaint   Patient presents with    [address]  25 yoM with PMH including substance abuse, anxiety, and depression who presents to the ED after an opioid overdose. Required hourly narcan in ED so started on narcan gtt and required low flow nasal cannula due to AMS with hypoxia. Admitted to MICU where he was successfully weaned off NC and the narcan gtt was discontinued. He is ambulating, voiding, and tolerating PO intake without nausea. He is A&Ox3. Medically stable for discharge back to [facility].  Appropriate follow-up given    Consults:   none    Procedures:  n/a    Any Hospital Acquired Infections: n/a    PATIENT'S DISCHARGE CONDITION:      PATIENT/FAMILY INSTRUCTIONS:   Current Discharge Medication List        STOP taking these medications       ibuprofen (ADVIL;MOTRIN) 600 MG tablet Comments:   Reason for Stopping:         gabapentin (NEURONTIN) 400 MG capsule Comments:   Reason for Stopping:         buprenorphine-naloxone (SUBOXONE) 8-2 MG FILM SL film Comments:   Reason for Stopping:             Activity: activity as tolerated    Diet: regular diet    Disposition: home / [facility]    Follow-up:  with [facility] outpatient clinic as needed    Electronically signed by Fermín Persaud

## 2023-04-11 NOTE — CARE COORDINATION
Consult received for positive urine tox screen. Met with pt who stated he was sent here from [facility]. Stated he had been at [facility] and left for a few days and then went to Detox and was then sent here. Pt reports he does not use alcohol or any other substances. Stated heroin/fentanyl is DOC. Stated he has not used since Nov, until yest.  Stated he smoked it yest. Stated prior to Nov, he was using daily, snorting. Pt stated he has been thru tx several times - [place] [facility] x2, [facility] x2 and [facility] x2. Discussed plans at discharge. Pt stated he wants to go back to [facility] for the 30 day program.  Pt declined SW assistance in getting back into tx and stated he plans to go to the walk in assessment clinic (8a-1p) once he is discharged. Pt will need to be to [facility] before 1:00 on day of discharge. RN/CM updated and CM will cab to [facility] at discharge.
Discharge [address] [facility]  Clinical Case Management Department  Written by: Jordan Moore RN    Patient Name: Lynnette Richards  Attending Provider: No att. providers found  Admit Date: 4/10/2023 10:44 AM  MRN: [id number]  Account: [de-identified]                     : 1995  Discharge Date: 2023      Disposition: cab transport to [facility] Jesus, RN
associated with the providers was provided to: (P) Patient   Patient Representative Name:       The Patient and/or Patient Representative Agree with the Discharge Plan?  (P) Yes    Mary Araiza RN  Case Management Department  Ph: [phone] Fax: [phone]

## 2023-04-11 NOTE — PROGRESS NOTES
Physician Progress Note      Precious Stinson  [facility] #:                  [id number]  :                       1995  ADMIT DATE:       4/10/2023 10:44 AM  DISCH DATE:  RESPONDING  PROVIDER #:        Franchesca Gore          QUERY TEXT:    Pt admitted with drug overdose. Pt noted to have altered mentation with   hypoxemia. If possible, please clarify the mentation for the patient from any   of the following: The medical record reflects the following:  Risk Factors: drug overdose. Clinical Indicators: cofusion with low sats, hypoxemia, fallling asleep. neuro   evaluations disoreinted to all initially. But by  @ 000- - alert and   oriented to all. Treatment: monitor, Narcan, and the IV- Narcan gtt, [facility] control [place],   IVF- NS @ 100. Oxygen therpay  - low dose. Thank you, please contact me for any questions.   Kristopher Millan RN, CDS  cell- [phone]  office hours - 630A-300P  Options provided:  -- Drug-induced encephalopathy due to Amphetamines, Cocaine, Fentanyl, Opiates  -- Other - I will add my own diagnosis  -- Disagree - Not applicable / Not valid  -- Disagree - Clinically unable to determine / Unknown  -- Refer to Clinical Documentation Reviewer    PROVIDER RESPONSE TEXT:    AMS secondary to overdose with hypoxia    Query created by: Ewa De Jesus on 2023 8:45 AM      Electronically signed by:  Franchesca Gore 2023 10:18 AM

## 2023-04-11 NOTE — PROGRESS NOTES
SPIRITUAL CARE DEPARTMENT - [facility] [address]  PROGRESS NOTE    Shift date: 4.10.2023  Shift day: Monday   Shift # 2    Room # 5398/4644-05   Name: Ananda Wasserman                Restorationist: unknown   Place of Anabaptism: unknown    Referral: Routine Visit    Admit Date & Time: 4/10/2023 10:44 AM    Assessment:  Ananda Wasserman is a 25 y.o. male in the hospital because of a possible overdose. Upon entering the room writer observes patient calm but not responding. Patient appears tired and passive. Intervention:  Writer introduced self and title as  Writer offered space for the patient  to express feelings, needs, and concerns and provided a ministry presence. Outcome:  Nurse stated no family present at this time. Patient does not seem to engage in conversation. Plan:  Chaplains will remain available to offer spiritual and emotional support as needed.       Electronically signed by Nedra Apple on 4/10/2023 at 8:00 PM.  [facility]  [phone]       04/10/23 1745   Encounter Summary   Service Provided For: Patient   Referral/Consult From: [facility]   Support System Unknown   Last Encounter  04/10/23   Complexity of Encounter Low   Begin Time 1745   End Time  1755   Total Time Calculated 10 min   Encounter    Type Initial Screen/Assessment   Assessment/Intervention/Outcome   Assessment Calm;Passive   Intervention Active listening;Explored Coping Skills/Resources   Outcome Did not respond     Electronically signed by Roseanne Alfaro on 4/10/2023 at 8:00 PM

## 2023-04-21 NOTE — PROGRESS NOTES
Physician Progress Note      Keven Olsen  [facility] #:                  [id number]  :                       1995  ADMIT DATE:       4/10/2023 10:44 AM  DISCH DATE:        2023 12:15 PM  RESPONDING  PROVIDER #:        Gertrude Reyes MD          QUERY TEXT:    Pt admitted with drug overdose. Pt noted to have altered mentation with   hypoxemia. If possible, please clarify the altered mental status from any of   the following: The medical record reflects the following:  Risk Factors: drug overdose. Clinical Indicators: confusion with low sats, hypoxemia, fallling asleep. Neuro evaluations disoreinted to all initially. Per assessment from Nursing   Notes noted that by  @ 0000- - alert and oriented to person, place, time   and situation. Treatment: monitor, Narcan, and [facility],   IVF- NS @ 100. Oxygen therpay  - low dose. Thank you, please contact me for any questions. Ivania Harris RN, [facility]  cell- [phone]  office hours - 630A-300P  Options provided:  -- Altered mental status due to toxic encephalopathy due to Amphetamines,   Cocaine, Opiates  -- Altered mental status due to drug induced encephalopathy due to   Amphetamines, Cocaine, Opiates  -- Other - I will add my own diagnosis  -- Disagree - Not applicable / Not valid  -- Disagree - Clinically unable to determine / Unknown  -- Refer to Clinical Documentation Reviewer    PROVIDER RESPONSE TEXT:    This patient has Altered mental status due to drug induced encephalopathy due   to Amphetamines, Cocaine, Opiates.     Query created by: Uma Crystal on 2023 8:27 AM      Electronically signed by:  Gertrude Reyes MD 2023 1:32 PM

## 2023-07-07 ENCOUNTER — HOSPITAL ENCOUNTER (EMERGENCY)
Age: 28
Discharge: HOME | End: 2023-07-07
Attending: EMERGENCY MEDICINE
Payer: OTHER GOVERNMENT

## 2023-07-07 VITALS
BODY MASS INDEX: 32.21 KG/M2 | HEART RATE: 70 BPM | RESPIRATION RATE: 16 BRPM | SYSTOLIC BLOOD PRESSURE: 137 MMHG | OXYGEN SATURATION: 92 % | DIASTOLIC BLOOD PRESSURE: 93 MMHG | TEMPERATURE: 98.1 F | HEIGHT: 70 IN | WEIGHT: 225 LBS

## 2023-07-07 VITALS
DIASTOLIC BLOOD PRESSURE: 62 MMHG | SYSTOLIC BLOOD PRESSURE: 121 MMHG | OXYGEN SATURATION: 94 % | TEMPERATURE: 98.2 F | HEART RATE: 60 BPM | WEIGHT: 225 LBS | BODY MASS INDEX: 32.28 KG/M2 | RESPIRATION RATE: 12 BRPM

## 2023-07-07 DIAGNOSIS — T50.904A DRUG OVERDOSE OF UNDETERMINED INTENT, INITIAL ENCOUNTER: Primary | ICD-10-CM

## 2023-07-07 DIAGNOSIS — Z13.9 ENCOUNTER FOR MEDICAL SCREENING EXAMINATION: Primary | ICD-10-CM

## 2023-07-07 LAB
ALBUMIN SERPL-MCNC: 4.8 G/DL (ref 3.5–5.2)
ALBUMIN/GLOB SERPL: 2.1 {RATIO} (ref 1–2.5)
ALP SERPL-CCNC: 68 U/L (ref 40–129)
ALT SERPL-CCNC: 20 U/L (ref 5–41)
ANION GAP SERPL CALCULATED.3IONS-SCNC: 12 MMOL/L (ref 9–17)
AST SERPL-CCNC: 25 U/L
BASOPHILS # BLD: 0.06 K/UL (ref 0–0.2)
BASOPHILS NFR BLD: 1 % (ref 0–2)
BILIRUB SERPL-MCNC: 0.6 MG/DL (ref 0.3–1.2)
BUN SERPL-MCNC: 14 MG/DL (ref 6–20)
CALCIUM SERPL-MCNC: 9.6 MG/DL (ref 8.6–10.4)
CHLORIDE SERPL-SCNC: 102 MMOL/L (ref 98–107)
CO2 SERPL-SCNC: 23 MMOL/L (ref 20–31)
CREAT SERPL-MCNC: 1.09 MG/DL (ref 0.7–1.2)
EOSINOPHIL # BLD: 0.38 K/UL (ref 0–0.44)
EOSINOPHILS RELATIVE PERCENT: 4 % (ref 1–4)
ERYTHROCYTE [DISTWIDTH] IN BLOOD BY AUTOMATED COUNT: 12.6 % (ref 11.8–14.4)
GFR SERPL CREATININE-BSD FRML MDRD: >60 ML/MIN/1.73M2
GLUCOSE SERPL-MCNC: 88 MG/DL (ref 70–99)
HCT VFR BLD AUTO: 40.7 % (ref 40.7–50.3)
HGB BLD-MCNC: 13.7 G/DL (ref 13–17)
IMM GRANULOCYTES # BLD AUTO: 0.03 K/UL (ref 0–0.3)
IMM GRANULOCYTES NFR BLD: 0 %
LYMPHOCYTES # BLD: 25 % (ref 24–43)
LYMPHOCYTES NFR BLD: 2.31 K/UL (ref 1.1–3.7)
MCH RBC QN AUTO: 30.2 PG (ref 25.2–33.5)
MCHC RBC AUTO-ENTMCNC: 33.7 G/DL (ref 28.4–34.8)
MCV RBC AUTO: 89.8 FL (ref 82.6–102.9)
MONOCYTES NFR BLD: 0.69 K/UL (ref 0.1–1.2)
MONOCYTES NFR BLD: 8 % (ref 3–12)
NEUTROPHILS NFR BLD: 62 % (ref 36–65)
NEUTS SEG NFR BLD: 5.79 K/UL (ref 1.5–8.1)
NRBC BLD-RTO: 0 PER 100 WBC
PLATELET # BLD AUTO: 190 K/UL (ref 138–453)
PMV BLD AUTO: 11.5 FL (ref 8.1–13.5)
POTASSIUM SERPL-SCNC: 3.4 MMOL/L (ref 3.7–5.3)
PROT SERPL-MCNC: 7.1 G/DL (ref 6.4–8.3)
RBC # BLD AUTO: 4.53 M/UL (ref 4.21–5.77)
SODIUM SERPL-SCNC: 137 MMOL/L (ref 135–144)
WBC OTHER # BLD: 9.3 K/UL (ref 3.5–11.3)

## 2023-07-07 PROCEDURE — 6370000000 HC RX 637 (ALT 250 FOR IP): Performed by: STUDENT IN AN ORGANIZED HEALTH CARE EDUCATION/TRAINING PROGRAM

## 2023-07-07 PROCEDURE — 80053 COMPREHEN METABOLIC PANEL: CPT

## 2023-07-07 PROCEDURE — 99283 EMERGENCY DEPT VISIT LOW MDM: CPT

## 2023-07-07 PROCEDURE — 85027 COMPLETE CBC AUTOMATED: CPT

## 2023-07-07 PROCEDURE — 93005 ELECTROCARDIOGRAM TRACING: CPT | Performed by: STUDENT IN AN ORGANIZED HEALTH CARE EDUCATION/TRAINING PROGRAM

## 2023-07-07 RX ORDER — HYDROXYZINE HYDROCHLORIDE 25 MG/1
25 TABLET, FILM COATED ORAL ONCE
Status: COMPLETED | OUTPATIENT
Start: 2023-07-07 | End: 2023-07-07

## 2023-07-07 RX ORDER — POTASSIUM CHLORIDE 20 MEQ/1
40 TABLET, EXTENDED RELEASE ORAL ONCE
Status: COMPLETED | OUTPATIENT
Start: 2023-07-07 | End: 2023-07-07

## 2023-07-07 RX ADMIN — HYDROXYZINE HYDROCHLORIDE 25 MG: 25 TABLET, FILM COATED ORAL at 14:51

## 2023-07-07 RX ADMIN — POTASSIUM CHLORIDE 40 MEQ: 1500 TABLET, EXTENDED RELEASE ORAL at 19:52

## 2023-07-07 ASSESSMENT — ENCOUNTER SYMPTOMS
EYE REDNESS: 0
ABDOMINAL PAIN: 0
SHORTNESS OF BREATH: 0

## 2023-07-07 ASSESSMENT — PAIN SCALES - GENERAL: PAINLEVEL_OUTOF10: 6

## 2023-07-07 ASSESSMENT — LIFESTYLE VARIABLES
HOW OFTEN DO YOU HAVE A DRINK CONTAINING ALCOHOL: NEVER
HOW MANY STANDARD DRINKS CONTAINING ALCOHOL DO YOU HAVE ON A TYPICAL DAY: PATIENT DOES NOT DRINK

## 2023-07-07 ASSESSMENT — PAIN - FUNCTIONAL ASSESSMENT
PAIN_FUNCTIONAL_ASSESSMENT: NONE - DENIES PAIN
PAIN_FUNCTIONAL_ASSESSMENT: 0-10

## 2023-07-07 NOTE — ED PROVIDER NOTES
[address] [place] [address] ED  Emergency Department Encounter  Emergency Medicine Resident     Pt Name:Suraj Noel  MRN: [id number]  [facility] 1995  Date of evaluation: 7/7/23  PCP:  No primary care provider on file. Note Started: 2:31 PM EDT    CHIEF COMPLAINT       Chief Complaint   Patient presents with    Other     HISTORY OF PRESENT ILLNESS  (Location/Symptom, Timing/Onset, Context/Setting, Quality, Duration, Modifying Factors, Severity.)      Martina Sanon is a 25 y.o. male who presents with concern over ingestion of unknown substance between transfer to another facility while in [employer] custody. Patient denies taking any substance, but officers are concerned because he is extremely sleepy, fidgety and his pupils are pinpoint. They state that they almost gave him Narcan on the way to the emergency department. Patient denies substance use but does state that he is extremely itchy and has a rash on his back. Patient states that he is tired because he \"was not allowed to sleep last night. \"  He denies chest pain, shortness of breath abdominal pain and headache. He is denying IV at this time and also denying further medical assessment. PAST MEDICAL / SURGICAL / SOCIAL / FAMILY HISTORY      has a past medical history of Anxiety, Depression, and History of substance abuse ([address] has a past surgical history that includes knee surgery; laparoscopic appendectomy (N/A, 01/08/2020); Humerus fracture surgery (Right, 09/22/2021); and Forearm surgery (Left, 4/12/2022).     Social History     Socioeconomic History    Marital status: Single     Spouse name: Not on file    Number of children: Not on file    Years of education: Not on file    Highest education level: Not on file   Occupational History    Not on file   Tobacco Use    Smoking status: Every Day     Packs/day: 0.25     Types: Cigarettes    Smokeless tobacco: Never   Vaping Use    Vaping Use: Former    Substances: Nicotine    Devices: Disposable

## 2023-07-07 NOTE — DISCHARGE INSTRUCTIONS
Call [facility] [phone] or go to [facility] at 7am Monday - Friday to be evaluated (this is first come first serve) to be evaluated for assistance with stopping. You can also contact Narcotics Anonymous for assistance. Stop using drugs. You can use diphenhydramine (Benadryl) for any feeling of anxiousness. PLEASE RETURN TO THE EMERGENCY DEPARTMENT IMMEDIATELY for worsening symptoms, or if you develop any concerning symptoms such as: high fever not relieved by acetaminophen (Tylenol) and/or ibuprofen (Motrin / Advil), chills, shortness of breath, chest pain, feeling of your heart fluttering or racing, persistent nausea and/or vomiting, vomiting up blood, blood in your stool, numbness, loss of consciousness, weakness or tingling in the arms or legs or change in color of the extremities, changes in mental status, persistent headache, blurry vision, loss of bladder / bowel control, unable to follow up with your physician, or other any other care or concern.

## 2023-07-07 NOTE — ED NOTES
Pt resting sitting up in stretcher. RR even and non labored. Correction officers at bedside.      Maria Luisa Elaine RN  07/07/23 4370

## 2023-07-07 NOTE — ED TRIAGE NOTES
Pt presents from [facility] escorted by two officers. Pt apparently was acting \"off\" high and so the officers did a strip search and pt sat down on the cell floor. Pt was then given 4 of intranasal narcan by nurse. Then brought to the ER. Pt was placed on full cardiac monitor, stretcher in lowest position. Pt remains in custody and being watched by group home personnel.

## 2023-07-07 NOTE — DISCHARGE INSTRUCTIONS
You were monitored in the emergency department given concern of ingestion. Further medical screening was declined. You may return to the emergency department for reevaluation if you decide to change her mind or if symptoms worsen.

## 2023-07-07 NOTE — ED TRIAGE NOTES
Patient brought in by [employer] police custody for medical clearance  Per officer patient was transferred from another facility and they believe the patient took an unknown substance    The officer told the Rn that they almost had to Narcan him on the way to the ER     Patient is here for Medical clearance     Patient is fidgety, falling \"asleep\" but easily wakes up   Clear speech

## 2023-07-09 LAB
EKG ATRIAL RATE: 66 BPM
EKG P AXIS: 53 DEGREES
EKG P-R INTERVAL: 162 MS
EKG Q-T INTERVAL: 380 MS
EKG QRS DURATION: 104 MS
EKG QTC CALCULATION (BAZETT): 398 MS
EKG R AXIS: 28 DEGREES
EKG T AXIS: 39 DEGREES
EKG VENTRICULAR RATE: 66 BPM

## 2023-07-09 PROCEDURE — 93010 ELECTROCARDIOGRAM REPORT: CPT | Performed by: INTERNAL MEDICINE

## 2023-07-09 ASSESSMENT — ENCOUNTER SYMPTOMS
VOMITING: 0
DIARRHEA: 0
RHINORRHEA: 0
SHORTNESS OF BREATH: 0
SORE THROAT: 0
NAUSEA: 0
COUGH: 0
ABDOMINAL PAIN: 0
BACK PAIN: 0

## 2023-09-27 ENCOUNTER — HOSPITAL ENCOUNTER (EMERGENCY)
Age: 28
Discharge: HOME | End: 2023-09-27
Attending: EMERGENCY MEDICINE
Payer: MEDICAID

## 2023-09-27 VITALS
SYSTOLIC BLOOD PRESSURE: 148 MMHG | HEIGHT: 70 IN | WEIGHT: 220 LBS | OXYGEN SATURATION: 93 % | DIASTOLIC BLOOD PRESSURE: 93 MMHG | HEART RATE: 116 BPM | RESPIRATION RATE: 24 BRPM | BODY MASS INDEX: 31.5 KG/M2

## 2023-09-27 VITALS
TEMPERATURE: 97.7 F | RESPIRATION RATE: 20 BRPM | OXYGEN SATURATION: 93 % | DIASTOLIC BLOOD PRESSURE: 59 MMHG | HEART RATE: 80 BPM | SYSTOLIC BLOOD PRESSURE: 108 MMHG

## 2023-09-27 DIAGNOSIS — T40.601A OPIATE OVERDOSE, ACCIDENTAL OR UNINTENTIONAL, INITIAL ENCOUNTER (HCC): Primary | ICD-10-CM

## 2023-09-27 DIAGNOSIS — F11.90 OPIOID USE DISORDER: Primary | ICD-10-CM

## 2023-09-27 LAB
AMPHET UR QL SCN: NEGATIVE
BARBITURATES UR QL SCN: NEGATIVE
BENZODIAZ UR QL: NEGATIVE
CANNABINOIDS UR QL SCN: NEGATIVE
COCAINE UR QL SCN: NEGATIVE
EKG ATRIAL RATE: 99 BPM
EKG P AXIS: 39 DEGREES
EKG P-R INTERVAL: 148 MS
EKG Q-T INTERVAL: 342 MS
EKG QRS DURATION: 106 MS
EKG QTC CALCULATION (BAZETT): 438 MS
EKG R AXIS: 46 DEGREES
EKG T AXIS: 32 DEGREES
EKG VENTRICULAR RATE: 99 BPM
FENTANYL UR QL: POSITIVE
METHADONE UR QL: NEGATIVE
OPIATES UR QL SCN: NEGATIVE
OXYCODONE UR QL SCN: NEGATIVE
PCP UR QL SCN: NEGATIVE
TEST INFORMATION: ABNORMAL

## 2023-09-27 PROCEDURE — 93005 ELECTROCARDIOGRAM TRACING: CPT | Performed by: EMERGENCY MEDICINE

## 2023-09-27 PROCEDURE — 99283 EMERGENCY DEPT VISIT LOW MDM: CPT

## 2023-09-27 PROCEDURE — 80307 DRUG TEST PRSMV CHEM ANLYZR: CPT

## 2023-09-27 PROCEDURE — 99284 EMERGENCY DEPT VISIT MOD MDM: CPT

## 2023-09-27 PROCEDURE — 93010 ELECTROCARDIOGRAM REPORT: CPT | Performed by: INTERNAL MEDICINE

## 2023-09-27 ASSESSMENT — PAIN - FUNCTIONAL ASSESSMENT: PAIN_FUNCTIONAL_ASSESSMENT: NONE - DENIES PAIN

## 2023-09-27 NOTE — ED PROVIDER NOTES
EMERGENCY DEPARTMENT ENCOUNTER    Pt Name: Ritu Kramer  MRN: [id number]  [facility] 1995  Date of evaluation: 9/27/23  CHIEF COMPLAINT       Chief Complaint   Patient presents with    Drug Overdose     HISTORY OF PRESENT ILLNESS   HPI  Brought in by EMS. Concern for drug abuse and overdose. Initially found by police, they found him walking on the road, he was agitated. They brought him in for psych eval.  Patient denies any suicidal homicidal ideations. He states he was just walking from [facility] to [employer]. [facility]. Patient states he has not slept all weekend. He denies any drug use. REVIEW OF SYSTEMS     Review of Systems   All other systems reviewed and are negative. PASTMEDICAL HISTORY     Past Medical History:   Diagnosis Date    Anxiety     Depression     History of substance abuse [facility])      Past Problem List  Patient Active Problem List   Diagnosis Code    Fracture of left radius S52. 92XA    Fracture of left ulna S52.202A    Cocaine use F14.90    Marijuana use F12.90    Opiate overdose (HCC) T40.601A    Hypoxia R09.02    Opioid overdose, accidental or unintentional, initial encounter ([facility]) T40.2X1A     SURGICAL HISTORY       Past Surgical History:   Procedure Laterality Date    FOREARM SURGERY Left 4/12/2022    DISTAL RADIUS OPEN REDUCTION INTERNAL FIXATION  TRIMED, C-ARM, APPLICATION OF SPLINT performed by Jazlyn Milligan DO at [address] [facility] Right 09/22/2021    ORIF DISTAL HUMERUS performed by Margie Sanchez MD at [facility] N/A 01/08/2020    APPENDECTOMY LAPAROSCOPIC performed by Bria Avila MD at [facility]       There are no discharge medications for this patient. ALLERGIES     is allergic to shellfish allergy and shellfish-derived products.   FAMILY HISTORY     He indicated that the status of

## 2023-09-27 NOTE — ED PROVIDER NOTES
EDT        Vitals Reviewed:    Vitals:    09/27/23 1330 09/27/23 1430 09/27/23 1500 09/27/23 1504   BP:  (!) 107/56 (!) 108/59    Pulse: 83 86 80    Resp: (!) 32 16 20    Temp:       TempSrc:       SpO2: (!) 87% (!) 88% (!) 89% 93%     MEDICATIONS GIVEN TO PATIENT THIS ENCOUNTER:  Orders Placed This Encounter   Medications    naloxone (NARCAN TO-GO) opiate overdose kit 1 each     DISCHARGE PRESCRIPTIONS:  There are no discharge medications for this patient. PHYSICIAN CONSULTS ORDERED THIS ENCOUNTER:  None  FINAL IMPRESSION      1.  Opiate overdose, accidental or unintentional, initial encounter [facility])          DISPOSITION/PLAN   DISPOSITION Decision To Discharge 09/27/2023 02:59:00 PM      OUTPATIENT FOLLOW UP THE PATIENT:  [facility] directly to [place] recovery now            MD Tato Clark MD  09/27/23 215 Noah Huber MD  09/27/23 3985

## 2023-09-27 NOTE — ED TRIAGE NOTES
Mode of arrival (squad #, walk in, police, etc) : ems        Chief complaint(s): suspected drug overdose        Arrival Note (brief scenario, treatment PTA, etc). : brought in by EMS because the police called because patient was walking down the road and not acting right        C= \"Have you ever felt that you should Cut down on your drinking? \"  No  A= \"Have people Annoyed you by criticizing your drinking? \"  No  G= \"Have you ever felt bad or Guilty about your drinking? \"  No  E= \"Have you ever had a drink as an Eye-opener first thing in the morning to steady your nerves or to help a hangover? \"  No      Deferred []      Reason for deferring: N/A    *If yes to two or more: probable alcohol abuse. *

## 2023-09-27 NOTE — ED TRIAGE NOTES
Mode of arrival (squad #, walk in, police, etc) : [facility]io Valdez        Chief complaint(s): overdose/ addiction problem        Arrival Note (brief scenario, treatment PTA, etc). : pt was found by EMS 2 respirations per minute. Pt given 2 mg narcan IN, then IV was established and pt was given 2 additional mg of narcan via IV. Pt then be came alert and breathing spontaneously. Pt wants help with his fentanyl addiction. Pt denies IV use, admits to snorting. Denies ETOH., occasionally  smokes marijuana        C= \"Have you ever felt that you should Cut down on your drinking? \"  No  A= \"Have people Annoyed you by criticizing your drinking? \"  No  G= \"Have you ever felt bad or Guilty about your drinking? \"  No  E= \"Have you ever had a drink as an Eye-opener first thing in the morning to steady your nerves or to help a hangover? \"  No      Deferred []      Reason for deferring: N/A    *If yes to two or more: probable alcohol abuse. *

## 2024-01-17 ENCOUNTER — HOSPITAL ENCOUNTER (EMERGENCY)
Age: 29
Discharge: NURSING, REHAB OR LONG-TERM CARE | End: 2024-01-18
Attending: EMERGENCY MEDICINE
Payer: MEDICAID

## 2024-01-17 DIAGNOSIS — T40.601A OPIATE OVERDOSE, ACCIDENTAL OR UNINTENTIONAL, INITIAL ENCOUNTER (HCC): Primary | ICD-10-CM

## 2024-01-17 PROCEDURE — 96361 HYDRATE IV INFUSION ADD-ON: CPT | Performed by: EMERGENCY MEDICINE

## 2024-01-17 PROCEDURE — 99284 EMERGENCY DEPT VISIT MOD MDM: CPT | Performed by: EMERGENCY MEDICINE

## 2024-01-17 PROCEDURE — 96374 THER/PROPH/DIAG INJ IV PUSH: CPT | Performed by: EMERGENCY MEDICINE

## 2024-01-18 VITALS
WEIGHT: 220 LBS | DIASTOLIC BLOOD PRESSURE: 77 MMHG | HEART RATE: 73 BPM | TEMPERATURE: 98.6 F | BODY MASS INDEX: 31.57 KG/M2 | RESPIRATION RATE: 13 BRPM | OXYGEN SATURATION: 94 % | SYSTOLIC BLOOD PRESSURE: 147 MMHG

## 2024-01-18 LAB
ALBUMIN SERPL-MCNC: 4.4 G/DL (ref 3.5–5.2)
ALBUMIN/GLOB SERPL: 1.6 {RATIO} (ref 1–2.5)
ALP SERPL-CCNC: 74 U/L (ref 40–129)
ALT SERPL-CCNC: 26 U/L (ref 5–41)
AMPHET UR QL SCN: NEGATIVE
ANION GAP SERPL CALCULATED.3IONS-SCNC: 15 MMOL/L (ref 9–17)
APAP SERPL-MCNC: <5 UG/ML (ref 10–30)
AST SERPL-CCNC: 22 U/L
BARBITURATES UR QL SCN: NEGATIVE
BASOPHILS # BLD: 0.05 K/UL (ref 0–0.2)
BASOPHILS NFR BLD: 1 % (ref 0–2)
BENZODIAZ UR QL: NEGATIVE
BILIRUB SERPL-MCNC: 0.4 MG/DL (ref 0.3–1.2)
BUN SERPL-MCNC: 19 MG/DL (ref 6–20)
CALCIUM SERPL-MCNC: 9.1 MG/DL (ref 8.6–10.4)
CANNABINOIDS UR QL SCN: POSITIVE
CHLORIDE SERPL-SCNC: 106 MMOL/L (ref 98–107)
CO2 SERPL-SCNC: 21 MMOL/L (ref 20–31)
COCAINE UR QL SCN: POSITIVE
CREAT SERPL-MCNC: 1.1 MG/DL (ref 0.7–1.2)
EOSINOPHIL # BLD: 0.33 K/UL (ref 0–0.44)
EOSINOPHILS RELATIVE PERCENT: 5 % (ref 1–4)
ERYTHROCYTE [DISTWIDTH] IN BLOOD BY AUTOMATED COUNT: 12.9 % (ref 11.8–14.4)
ETHANOL PERCENT: <0.01 %
ETHANOLAMINE SERPL-MCNC: <10 MG/DL
FENTANYL UR QL: POSITIVE
GFR SERPL CREATININE-BSD FRML MDRD: >60 ML/MIN/1.73M2
GLUCOSE BLD-MCNC: 109 MG/DL (ref 75–110)
GLUCOSE SERPL-MCNC: 63 MG/DL (ref 70–99)
HCT VFR BLD AUTO: 40 % (ref 40.7–50.3)
HGB BLD-MCNC: 13.1 G/DL (ref 13–17)
IMM GRANULOCYTES # BLD AUTO: <0.03 K/UL (ref 0–0.3)
IMM GRANULOCYTES NFR BLD: 0 %
LYMPHOCYTES NFR BLD: 2.41 K/UL (ref 1.1–3.7)
LYMPHOCYTES RELATIVE PERCENT: 38 % (ref 24–43)
MCH RBC QN AUTO: 29.4 PG (ref 25.2–33.5)
MCHC RBC AUTO-ENTMCNC: 32.8 G/DL (ref 28.4–34.8)
MCV RBC AUTO: 89.9 FL (ref 82.6–102.9)
METHADONE UR QL: NEGATIVE
MONOCYTES NFR BLD: 0.58 K/UL (ref 0.1–1.2)
MONOCYTES NFR BLD: 9 % (ref 3–12)
NEUTROPHILS NFR BLD: 47 % (ref 36–65)
NEUTS SEG NFR BLD: 3.02 K/UL (ref 1.5–8.1)
NRBC BLD-RTO: 0 PER 100 WBC
OPIATES UR QL SCN: NEGATIVE
OXYCODONE UR QL SCN: NEGATIVE
PCP UR QL SCN: NEGATIVE
PLATELET # BLD AUTO: 200 K/UL (ref 138–453)
PMV BLD AUTO: 10.3 FL (ref 8.1–13.5)
POTASSIUM SERPL-SCNC: 3.5 MMOL/L (ref 3.7–5.3)
PROT SERPL-MCNC: 7.2 G/DL (ref 6.4–8.3)
RBC # BLD AUTO: 4.45 M/UL (ref 4.21–5.77)
SALICYLATES SERPL-MCNC: <1 MG/DL (ref 3–10)
SARS-COV-2 RDRP RESP QL NAA+PROBE: NOT DETECTED
SODIUM SERPL-SCNC: 142 MMOL/L (ref 135–144)
SPECIMEN DESCRIPTION: NORMAL
TEST INFORMATION: ABNORMAL
TOXIC TRICYCLIC SC,BLOOD: NEGATIVE
WBC OTHER # BLD: 6.4 K/UL (ref 3.5–11.3)

## 2024-01-18 PROCEDURE — 80143 DRUG ASSAY ACETAMINOPHEN: CPT

## 2024-01-18 PROCEDURE — 80179 DRUG ASSAY SALICYLATE: CPT

## 2024-01-18 PROCEDURE — 80053 COMPREHEN METABOLIC PANEL: CPT

## 2024-01-18 PROCEDURE — 80307 DRUG TEST PRSMV CHEM ANLYZR: CPT

## 2024-01-18 PROCEDURE — 6360000002 HC RX W HCPCS: Performed by: STUDENT IN AN ORGANIZED HEALTH CARE EDUCATION/TRAINING PROGRAM

## 2024-01-18 PROCEDURE — G0480 DRUG TEST DEF 1-7 CLASSES: HCPCS

## 2024-01-18 PROCEDURE — 2580000003 HC RX 258: Performed by: STUDENT IN AN ORGANIZED HEALTH CARE EDUCATION/TRAINING PROGRAM

## 2024-01-18 PROCEDURE — 87635 SARS-COV-2 COVID-19 AMP PRB: CPT

## 2024-01-18 PROCEDURE — 85025 COMPLETE CBC W/AUTO DIFF WBC: CPT

## 2024-01-18 PROCEDURE — 82947 ASSAY GLUCOSE BLOOD QUANT: CPT

## 2024-01-18 RX ORDER — NALOXONE HYDROCHLORIDE 4 MG/.1ML
1 SPRAY NASAL ONCE
Status: DISCONTINUED | OUTPATIENT
Start: 2024-01-18 | End: 2024-01-18 | Stop reason: HOSPADM

## 2024-01-18 RX ORDER — ONDANSETRON 2 MG/ML
4 INJECTION INTRAMUSCULAR; INTRAVENOUS ONCE
Status: COMPLETED | OUTPATIENT
Start: 2024-01-18 | End: 2024-01-18

## 2024-01-18 RX ORDER — 0.9 % SODIUM CHLORIDE 0.9 %
1000 INTRAVENOUS SOLUTION INTRAVENOUS ONCE
Status: COMPLETED | OUTPATIENT
Start: 2024-01-18 | End: 2024-01-18

## 2024-01-18 RX ADMIN — SODIUM CHLORIDE 1000 ML: 9 INJECTION, SOLUTION INTRAVENOUS at 00:09

## 2024-01-18 RX ADMIN — ONDANSETRON 4 MG: 2 INJECTION INTRAMUSCULAR; INTRAVENOUS at 00:09

## 2024-01-18 ASSESSMENT — ENCOUNTER SYMPTOMS
NAUSEA: 0
VOMITING: 0
ABDOMINAL PAIN: 0
SHORTNESS OF BREATH: 0
BACK PAIN: 0

## 2024-01-18 ASSESSMENT — PAIN - FUNCTIONAL ASSESSMENT: PAIN_FUNCTIONAL_ASSESSMENT: NONE - DENIES PAIN

## 2024-01-18 NOTE — ED NOTES
SW received call from Mary at [facility] regarding pt transportation update. Per Mary, [facility] [employer] [facility] will be transporting pt. ETA 8am

## 2024-01-18 NOTE — ED NOTES
DAVE received call from Adry at [facility]. Pt accepted to [facility] by Dr. Joseph. DAVE to schedule pt transport. Per Adry, request pt be transported after 8am.

## 2024-01-18 NOTE — ED NOTES
DAVE faxed pt documentation and lab results to [facility]. Awaiting response from [facility].

## 2024-01-18 NOTE — ED NOTES
SW met with pt per consult request from Dr. Max. Per pt, he is using about 1/2 gram of fentanyl daily and denies other substance use. Pt reports he has been using on and off for the last 9 yrs and reports he is currently on suboxone through [facility]. Pt reports he is compliant with suboxone appointments. Pt reports he is wanting to go to treatment for his substance use. SW discussed pt options with pt, pt agreeable to [place], [facility], or [facility] for detox. Pt denies SI/HI

## 2024-01-18 NOTE — ED NOTES
SW met with pt per consult request from Dr. Max. Per pt, he is using about 1/2 gram of fentanyl daily and denies other substance use. Pt reports he has been using on and off for the last 9 yrs and reports he is currently on suboxone through [facility]. Pt reports he is compliant with suboxone appointments. Pt reports he is wanting to go to treatment for his substance use. SW discussed pt options with pt, pt agreeable to [place], [facility], or [facility] for detox. Pt denies SI/HI

## 2024-01-18 NOTE — DISCHARGE INSTRUCTIONS
You are medically cleared and stable. Proceed to [facility] for treatment of your opioid disorder.

## 2024-01-18 NOTE — ED NOTES
Pt presents to the ED via EMS with c/o of drug overdose.   Pt states he was \"shooting\" heroine with fentanyl at [facility] where patient stays and overdosed.  Pt received 1 dose of narcan by EMS and was alert and oriented by the time they arrived.   Pt is requesting treatment for drug abuse.   Pt states this is approximately his 5th overdose. Pt states he has been to treatment in the past.   Pt denies other drug use, denies alcohol use.   Pt is alert and oriented x4, ambulatory.   Pt place on full cardiac monitor, Call light in reach, white board updated.

## 2024-01-18 NOTE — ED NOTES
[facility]. Per Mary, requested SW to send medical necessity form and face sheet, and will contact SW when she has an ETA for pt transport. Medical necessity documentation faxed.

## 2024-01-18 NOTE — ED PROVIDER NOTES
[facility]     Emergency Department     Faculty Attestation    I performed a history and physical examination of the patient and discussed management with the resident. I have reviewed and agree with the resident’s findings including all diagnostic interpretations, and treatment plans as written. Any areas of disagreement are noted on the chart. I was personally present for the key portions of any procedures. I have documented in the chart those procedures where I was not present during the key portions. I have reviewed the emergency nurses triage note. I agree with the chief complaint, past medical history, past surgical history, allergies, medications, social and family history as documented unless otherwise noted below. Documentation of the HPI, Physical Exam and Medical Decision Making performed by marianaibkaren is based on my personal performance of the HPI, PE and MDM. For Physician Assistant/ Nurse Practitioner cases/documentation I have personally evaluated this patient and have completed at least one if not all key elements of the E/M (history, physical exam, and MDM). Additional findings are as noted.    Note Started: 12:26 AM EST     29 yo M accidental overdose / injecting fentanyl, no suicidal or homicidal ideation, no nausea, no injury, no fever,   PE vss gcs 15 SANDRA, radial pulse =,   No indication of acute psychosis, no pressured speech,    Labs / social work to evaluate  Attempting placement to [facility] /care turned over to day shift    EKG Interpretation    Interpreted by me  Normal sinus, heart rate 85, no ischemia, normal axis, QT corrected 416, incomplete rbbb, compared to ECG from 9/27/2023, stable,    CRITICAL CARE: There was a high probability of clinically significant/life threatening deterioration in this patient's condition which required my urgent intervention.  Total critical care time was 0 minutes.  This excludes any time for 
        [facility] ED  Emergency Department  Emergency Medicine Sign-out     Care of Suraj Mullins was assumed from Dr. Max and is being seen for Drug Overdose (2mg narcan given by staff ) and Drug / Alcohol Assessment (Requesting treatment for fentanyl+heroin  abuse )  .  The patient's initial evaluation and plan have been discussed with the prior attending who initially evaluated the patient.     EMERGENCY DEPARTMENT COURSE / MEDICAL DECISION MAKING:       MEDICATIONS GIVEN:  Orders Placed This Encounter   Medications    ondansetron (ZOFRAN) injection 4 mg    sodium chloride 0.9 % bolus 1,000 mL    naloxone opiate overdose kit 4mg       LABS / RADIOLOGY:     Labs Reviewed   CBC WITH AUTO DIFFERENTIAL - Abnormal; Notable for the following components:       Result Value    Hematocrit 40.0 (*)     Eosinophils % 5 (*)     All other components within normal limits   COMPREHENSIVE METABOLIC PANEL - Abnormal; Notable for the following components:    Potassium 3.5 (*)     Glucose 63 (*)     All other components within normal limits   TOX SCR, BLD, ED - Abnormal; Notable for the following components:    Acetaminophen Level <5 (*)     Salicylate Lvl <1.0 (*)     All other components within normal limits   URINE DRUG SCREEN - Abnormal; Notable for the following components:    Cocaine Metabolite, Urine POSITIVE (*)     Cannabinoid Scrn, Ur POSITIVE (*)     Fentanyl, Ur POSITIVE (*)     All other components within normal limits   COVID-19, RAPID   POC GLUCOSE FINGERSTICK       No results found.    RECENT VITALS:     Temp: 98.6 °F (37 °C),  Pulse: 73, Respirations: 13, BP: (!) 147/77, SpO2: 94 %    This patient is a 28 y.o. Male with unintentional opioid overdose. No SI/HI. He is requesting detox. Accepted to [facility]. Awaiting transport.     ED Course as of 01/18/24 0819   Thu Jan 18, 2024   0007 Unintentional opioid overdose.  Patient injects fentanyl.  Denies any other drug or alcohol use.  
[facility]  FACULTY HANDOFF     7:35 AM EST  Handoff taken on the following patient from prior Attending Physician:  Pt Name: Suraj Danya Susanna  PCP:  No primary care provider on file.    Attestation  I was available and discussed any additional care issues that arose and coordinated the management plans with the resident(s) caring for the patient during my duty period. Any areas of disagreement with resident's documentation of care or procedures are noted on the chart. I was personally present for the key portions of any/all procedures during my duty period. I have documented in the chart those procedures where I was not present during the key portions.         CHIEF COMPLAINT       Chief Complaint   Patient presents with    Drug Overdose     2mg narcan given by staff     Drug / Alcohol Assessment     Requesting treatment for fentanyl+heroin  abuse          CURRENT MEDICATIONS     Previous Medications  Previous Medications    No medications on file       Encounter Medications  Orders Placed This Encounter   Medications    ondansetron (ZOFRAN) injection 4 mg    sodium chloride 0.9 % bolus 1,000 mL       ALLERGIES     is allergic to shellfish allergy and shellfish-derived products.      RECENT VITALS:   Temp: 98.6 °F (37 °C),  Pulse: 73, Respirations: 13, BP: (!) 147/77    RADIOLOGY:   No orders to display       LABS:  Labs Reviewed   CBC WITH AUTO DIFFERENTIAL - Abnormal; Notable for the following components:       Result Value    Hematocrit 40.0 (*)     Eosinophils % 5 (*)     All other components within normal limits   COMPREHENSIVE METABOLIC PANEL - Abnormal; Notable for the following components:    Potassium 3.5 (*)     Glucose 63 (*)     All other components within normal limits   TOX SCR, BLD, ED - Abnormal; Notable for the following components:    Acetaminophen Level <5 (*)     Salicylate Lvl <1.0 (*)     All other components within normal limits   URINE DRUG SCREEN - Abnormal; 
acute distress asymptomatic here no signs of trauma or other coingestions medically cleared and stable excepted at [facility] for inpatient opioid treatment.  Patient discharged    Amount and/or Complexity of Data Reviewed  Labs: ordered. Decision-making details documented in ED Course.    Risk  Prescription drug management.        EKG      All EKG's are interpreted by the Emergency Department Physician who either signs or Co-signs this chart in the absence of a cardiologist.    EMERGENCY DEPARTMENT COURSE:  ED Course as of 01/18/24 0624   Thu Jan 18, 2024   0007 Unintentional opioid overdose.  Patient injects fentanyl.  Denies any other drug or alcohol use.  Denies any thoughts of harming himself.  He denies any pain or injury.  No nausea or vomiting.  He is requesting opioid detox.  Nontoxic no acute distress will give fluids Zofran have social work evaluate [ZE]   0047 Patient medically cleared and stable for evaluation by inpatient detox [ZE]   0338 Patient awaiting response from [facility] for admission [ZE]   0623 Accepted at [facility] transport 0800 Dr. Joseph [ZE]      ED Course User Index  [ZE] Fran Max DO       PROCEDURES:  Procedures    CONSULTS:  IP CONSULT TO SOCIAL WORK    CRITICAL CARE:  See MDM    FINAL IMPRESSION      1. Opiate overdose, accidental or unintentional, initial encounter (HCC)          DISPOSITION / PLAN     DISPOSITION  transport 0800 discharge      PATIENT REFERRED TO:  [facility] ED  [address] [address]  [phone]    If symptoms worsen      DISCHARGE MEDICATIONS:  New Prescriptions    No medications on file       Fran Max DO  Emergency Medicine Resident    (Please note that portions of thisnote were completed with a voice recognition program.  Efforts were made to edit the dictations but occasionally words are mis-transcribed.)

## 2024-01-18 NOTE — ED NOTES
The following labs were labeled with appropriate pt sticker and tubed to lab:     [] Blue     [] Lavender   [] on ice  [] Green/yellow  [] Green/black [] on ice  [] Grey  [] on ice  [] Yellow  [] Red  [] Pink  [] Type/ Screen  [] ABG  [] VBG    [x] COVID-19 swab    [x] Rapid  [] PCR  [] Flu swab  [] Peds Viral Panel     [] Urine Sample  [] Fecal Sample  [] Pelvic Cultures  [] Blood Cultures  [] X 2  [] STREP Cultures  [] Wound Cultures

## 2024-01-18 NOTE — ED NOTES
The following labs labeled with pt sticker and tubed to lab:     [] Blue     [] Lavender   [] on ice  [] Green/yellow  [] Green/black [] on ice  [] Yellow  [] Red  [] Pink      [] COVID-19 swab    [] Rapid  [] PCR  [] Flu Swab  [] Strep Swab  [] Peds Viral Panel     [x] Urine Sample  [] Pelvic Cultures  [] Blood Cultures   [] Wound Cultures

## 2024-01-27 ENCOUNTER — HOSPITAL ENCOUNTER (EMERGENCY)
Age: 29
Discharge: HOME | End: 2024-01-27
Attending: STUDENT IN AN ORGANIZED HEALTH CARE EDUCATION/TRAINING PROGRAM
Payer: MEDICAID

## 2024-01-27 VITALS
TEMPERATURE: 98.2 F | HEART RATE: 94 BPM | HEIGHT: 70 IN | DIASTOLIC BLOOD PRESSURE: 87 MMHG | WEIGHT: 210 LBS | OXYGEN SATURATION: 96 % | SYSTOLIC BLOOD PRESSURE: 152 MMHG | BODY MASS INDEX: 30.06 KG/M2 | RESPIRATION RATE: 16 BRPM

## 2024-01-27 DIAGNOSIS — F11.10 OPIOID ABUSE (HCC): Primary | ICD-10-CM

## 2024-01-27 PROCEDURE — 99282 EMERGENCY DEPT VISIT SF MDM: CPT

## 2024-01-27 ASSESSMENT — ENCOUNTER SYMPTOMS
EYE DISCHARGE: 0
RHINORRHEA: 0
SHORTNESS OF BREATH: 0
SORE THROAT: 0
ABDOMINAL PAIN: 0
VOMITING: 0
EYE REDNESS: 0
DIARRHEA: 0
CHEST TIGHTNESS: 0
NAUSEA: 0

## 2024-01-27 ASSESSMENT — PAIN - FUNCTIONAL ASSESSMENT
PAIN_FUNCTIONAL_ASSESSMENT: NONE - DENIES PAIN
PAIN_FUNCTIONAL_ASSESSMENT: NONE - DENIES PAIN

## 2024-01-27 NOTE — ED TRIAGE NOTES
Mode of arrival (squad #, walk in, police, etc) : [employer]        Chief complaint(s): mental health problems         Arrival Note (brief scenario, treatment PTA, etc).: upset wants to talk to someone under a lot of stress and been using drugs         C= \"Have you ever felt that you should Cut down on your drinking?\"  No  A= \"Have people Annoyed you by criticizing your drinking?\"  No  G= \"Have you ever felt bad or Guilty about your drinking?\"  No  E= \"Have you ever had a drink as an Eye-opener first thing in the morning to steady your nerves or to help a hangover?\"  No      Deferred []      Reason for deferring: N/A    *If yes to two or more: probable alcohol abuse.*

## 2024-01-27 NOTE — ED PROVIDER NOTES
EMERGENCY DEPARTMENT ENCOUNTER    Pt Name: Suraj Mullins  MRN: [id number]  Birthdate 1995  Date of evaluation: 1/27/24  CHIEF COMPLAINT       Chief Complaint   Patient presents with    Mental Health Problem     HISTORY OF PRESENT ILLNESS   29-year-old with history of anxiety and depression and opioid abuse presenting stating he wants to talk with someone    States that he has had issues with opiate abuse and he would like to talk with someone about this    Denies any suicidal homicidal ideation    Want some resources for rehab              REVIEW OF SYSTEMS     Review of Systems   Constitutional:  Negative for chills and fever.   HENT:  Negative for rhinorrhea and sore throat.    Eyes:  Negative for discharge and redness.   Respiratory:  Negative for chest tightness and shortness of breath.    Cardiovascular:  Negative for chest pain.   Gastrointestinal:  Negative for abdominal pain, diarrhea, nausea and vomiting.   Genitourinary:  Negative for dysuria and frequency.   Musculoskeletal:  Negative for arthralgias and myalgias.   Skin:  Negative for rash.   Neurological:  Negative for weakness and numbness.   Psychiatric/Behavioral:  Negative for suicidal ideas.    All other systems reviewed and are negative.    PASTMEDICAL HISTORY     Past Medical History:   Diagnosis Date    Anxiety     Depression     History of substance abuse ([facility])      Past Problem List  Patient Active Problem List   Diagnosis Code    Fracture of left radius S52.92XA    Fracture of left ulna S52.202A    Cocaine use F14.90    Marijuana use F12.90    Opiate overdose ([facility]) T40.601A    Hypoxia R09.02    Opioid overdose, accidental or unintentional, initial encounter ([facility]) T40.2X1A     SURGICAL HISTORY       Past Surgical History:   Procedure Laterality Date    FOREARM SURGERY Left 4/12/2022    DISTAL RADIUS OPEN REDUCTION INTERNAL FIXATION  TRIMED, C-ARM, APPLICATION OF SPLINT performed by Hipolito Padilla DO at [facility] OR    HUMERUS

## 2024-01-27 NOTE — ED NOTES
Patient presented to ED via [employer] because he was \"screaming and yelling in an alley behind someone's house\".  Per [employer] he \"seemed upset\" and they encouraged him to come here to \"talk to someone\".  Patient denies any SI or HI.  Patient denies hallucinations.  Patient has a significant drug abuse problem and is seeking support and assistance for ongoing help.  Patient reports he was just discharged from [facility] and it \"didn't go well\" because as soon as he was discharged he used opiates again.  Patient does present under the influence, but reports his last use was \"earlier yesterday\".  Patient reports he wants to get sober for his daughter and the reason he was upset earlier was because his \"baby momma\" is seeing both a rohini and a girl at the same time and he is fearful of what is daughter is being exposed to.    Patient vented to this writer about his frustrations and identified the desire to go to an inpatient detox facility that isn't in [place].  Patient reports everything is \"too accessible\" and that he \"knows everyone\" in [place].  Patient believes that if he were to go to another facility that is not [place] he may be more successful.  Patient does report several previous failed inpatient rehabilitation attempts.    This writer provided patient with a list of inpatient detox facilities and outpatient programs that accept his insurance.  Patient was provided with food and drink and then discharged.  Patient was educated that his insurance will pay for any transportation to an inpatient detox facility.  Patient is agreeable with this discharge plan.

## 2024-03-13 ENCOUNTER — HOSPITAL ENCOUNTER (EMERGENCY)
Age: 29
Discharge: NURSING, REHAB OR LONG-TERM CARE | End: 2024-03-13
Attending: EMERGENCY MEDICINE
Payer: MEDICAID

## 2024-03-13 VITALS
RESPIRATION RATE: 22 BRPM | HEART RATE: 90 BPM | DIASTOLIC BLOOD PRESSURE: 68 MMHG | SYSTOLIC BLOOD PRESSURE: 124 MMHG | TEMPERATURE: 97.7 F | OXYGEN SATURATION: 95 % | BODY MASS INDEX: 29.41 KG/M2 | WEIGHT: 205 LBS

## 2024-03-13 DIAGNOSIS — T40.601A OPIATE OVERDOSE, ACCIDENTAL OR UNINTENTIONAL, INITIAL ENCOUNTER (HCC): Primary | ICD-10-CM

## 2024-03-13 LAB — GLUCOSE BLD-MCNC: 105 MG/DL (ref 75–110)

## 2024-03-13 PROCEDURE — 2580000003 HC RX 258: Performed by: STUDENT IN AN ORGANIZED HEALTH CARE EDUCATION/TRAINING PROGRAM

## 2024-03-13 PROCEDURE — 93005 ELECTROCARDIOGRAM TRACING: CPT | Performed by: EMERGENCY MEDICINE

## 2024-03-13 PROCEDURE — 96374 THER/PROPH/DIAG INJ IV PUSH: CPT

## 2024-03-13 PROCEDURE — 6360000002 HC RX W HCPCS: Performed by: STUDENT IN AN ORGANIZED HEALTH CARE EDUCATION/TRAINING PROGRAM

## 2024-03-13 PROCEDURE — 82947 ASSAY GLUCOSE BLOOD QUANT: CPT

## 2024-03-13 PROCEDURE — 99284 EMERGENCY DEPT VISIT MOD MDM: CPT

## 2024-03-13 RX ORDER — 0.9 % SODIUM CHLORIDE 0.9 %
1000 INTRAVENOUS SOLUTION INTRAVENOUS ONCE
Status: COMPLETED | OUTPATIENT
Start: 2024-03-13 | End: 2024-03-13

## 2024-03-13 RX ORDER — NALOXONE HYDROCHLORIDE 1 MG/ML
1 INJECTION INTRAMUSCULAR; INTRAVENOUS; SUBCUTANEOUS ONCE
Status: COMPLETED | OUTPATIENT
Start: 2024-03-13 | End: 2024-03-13

## 2024-03-13 RX ADMIN — NALOXONE HYDROCHLORIDE 1 MG: 1 INJECTION PARENTERAL at 06:37

## 2024-03-13 RX ADMIN — SODIUM CHLORIDE 1000 ML: 9 INJECTION, SOLUTION INTRAVENOUS at 04:12

## 2024-03-13 ASSESSMENT — ENCOUNTER SYMPTOMS
NAUSEA: 0
VOMITING: 0
ABDOMINAL PAIN: 0
SHORTNESS OF BREATH: 0
COUGH: 0

## 2024-03-13 ASSESSMENT — PAIN - FUNCTIONAL ASSESSMENT: PAIN_FUNCTIONAL_ASSESSMENT: NONE - DENIES PAIN

## 2024-03-13 NOTE — ED PROVIDER NOTES
Faculty Sign-Out Attestation  Handoff taken on the following patient from prior Attending Physician: Shaun  Note Started: 1:51 AM EDT    I was available and discussed any additional care issues that arose and coordinated the management plans with the resident(s) caring for the patient during my duty period. Any areas of disagreement with resident’s documentation of care or procedures are noted on the chart. I was personally present for the key portions of any/all procedures during my duty period. I have documented in the chart those procedures where I was not present during the key portions.    Snorted fentanyl, OD at [facility],   1.5 hour observation,     Pt awake, ambulatory, tolerating po, talkative  Plan return to [facility], vss gcs 15     Zay Melton, DO  03/13/24 0715    
recorded by the APC.    David Dunn MD  Attending Emergency  Physician        David Dunn MD  03/13/24 0106    
answering questions appropriately.  [CH]   0635 Patient reassessed, continues to be somnolent but arousable to physical stimulation.  Patient goes immediately back to sleep when not stimulated.  Narcan 1 mg IV ordered. [CH]   0703 Patient reassessed, fully awake and interactive, ambulating in room.  Will discharge at this time with transportation to [facility] for rehab services.  Counseled on signs/symptoms of necessitate immediate return to ED. [CH]      ED Course User Index  [CH] Altaf Jenkins MD       PROCEDURES:      CONSULTS:  None    CRITICAL CARE:  There was significant risk of life threatening deterioration of patient's condition requiring my direct management. Critical care time 0 minutes, excluding any documented procedures.    FINAL IMPRESSION      1. Opiate overdose, accidental or unintentional, initial encounter ([facility])          DISPOSITION / PLAN     DISPOSITION Decision To Discharge 03/13/2024 06:59:39 AM      PATIENT REFERRED TO:  No follow-up provider specified.    DISCHARGE MEDICATIONS:  New Prescriptions    No medications on file       Altaf Jenkins MD  Emergency Medicine Resident    (Please note that portions of thisnote were completed with a voice recognition program.  Efforts were made to edit the dictations but occasionally words are mis-transcribed.)

## 2024-03-13 NOTE — ED NOTES
Pt arrived to ED through EMS with c/o of heroine/fentanyl use  Pt denies any medical hx  Pt stated he does not know if it was laced   Pt alert and oriented but falls asleep easily  Pt connected to monitor, bp and pulse ox  IV access started with labs drawn  Pt appears to be in no acute distress at this time

## 2024-03-13 NOTE — ED NOTES
Writer arranged transport for patient back to [facility] this morning. Writer informed patient of B/W i HOOD in 2 minutes, patient expressed understanding. Writer just now received word from Bernadette of [facility] that patient never showed up & his belongings are still there. Bernadette stated patient may get his belongings anytime.

## 2024-03-13 NOTE — DISCHARGE INSTRUCTIONS
Transportation to [facility] is being arranged for you, be sure to continue with rehab treatment.  Avoid further drug use.  Return to emergency department for any acute concerns.

## 2024-03-14 LAB
EKG ATRIAL RATE: 97 BPM
EKG P AXIS: 36 DEGREES
EKG P-R INTERVAL: 136 MS
EKG Q-T INTERVAL: 348 MS
EKG QRS DURATION: 98 MS
EKG QTC CALCULATION (BAZETT): 441 MS
EKG R AXIS: 31 DEGREES
EKG T AXIS: 62 DEGREES
EKG VENTRICULAR RATE: 97 BPM

## 2024-03-27 ENCOUNTER — HOSPITAL ENCOUNTER (EMERGENCY)
Age: 29
Discharge: HOME | End: 2024-03-28
Attending: EMERGENCY MEDICINE
Payer: MEDICAID

## 2024-03-27 ENCOUNTER — APPOINTMENT (OUTPATIENT)
Dept: GENERAL RADIOLOGY | Age: 29
End: 2024-03-27
Payer: MEDICAID

## 2024-03-27 DIAGNOSIS — L03.116 CELLULITIS OF LEFT LEG: Primary | ICD-10-CM

## 2024-03-27 LAB
BASOPHILS # BLD: 0.1 K/UL (ref 0–0.2)
BASOPHILS NFR BLD: 1 % (ref 0–2)
EOSINOPHIL # BLD: 0.4 K/UL (ref 0–0.4)
EOSINOPHILS RELATIVE PERCENT: 3 % (ref 0–4)
ERYTHROCYTE [DISTWIDTH] IN BLOOD BY AUTOMATED COUNT: 14.3 % (ref 11.5–14.9)
ERYTHROCYTE [SEDIMENTATION RATE] IN BLOOD BY PHOTOMETRIC METHOD: 4 MM/HR (ref 0–15)
HCT VFR BLD AUTO: 42 % (ref 41–53)
HGB BLD-MCNC: 14.1 G/DL (ref 13.5–17.5)
LYMPHOCYTES NFR BLD: 1.6 K/UL (ref 1–4.8)
LYMPHOCYTES RELATIVE PERCENT: 14 % (ref 24–44)
MCH RBC QN AUTO: 28.8 PG (ref 26–34)
MCHC RBC AUTO-ENTMCNC: 33.5 G/DL (ref 31–37)
MCV RBC AUTO: 85.9 FL (ref 80–100)
MONOCYTES NFR BLD: 0.7 K/UL (ref 0.1–1.3)
MONOCYTES NFR BLD: 7 % (ref 1–7)
NEUTROPHILS NFR BLD: 75 % (ref 36–66)
NEUTS SEG NFR BLD: 8.2 K/UL (ref 1.3–9.1)
PLATELET # BLD AUTO: 252 K/UL (ref 150–450)
PMV BLD AUTO: 8.1 FL (ref 6–12)
RBC # BLD AUTO: 4.89 M/UL (ref 4.5–5.9)
WBC OTHER # BLD: 10.9 K/UL (ref 3.5–11)

## 2024-03-27 PROCEDURE — 85652 RBC SED RATE AUTOMATED: CPT

## 2024-03-27 PROCEDURE — 36415 COLL VENOUS BLD VENIPUNCTURE: CPT

## 2024-03-27 PROCEDURE — 99284 EMERGENCY DEPT VISIT MOD MDM: CPT

## 2024-03-27 PROCEDURE — 85025 COMPLETE CBC W/AUTO DIFF WBC: CPT

## 2024-03-27 PROCEDURE — 80053 COMPREHEN METABOLIC PANEL: CPT

## 2024-03-27 PROCEDURE — 86140 C-REACTIVE PROTEIN: CPT

## 2024-03-27 PROCEDURE — 73590 X-RAY EXAM OF LOWER LEG: CPT

## 2024-03-27 ASSESSMENT — LIFESTYLE VARIABLES
HOW MANY STANDARD DRINKS CONTAINING ALCOHOL DO YOU HAVE ON A TYPICAL DAY: PATIENT DOES NOT DRINK
HOW OFTEN DO YOU HAVE A DRINK CONTAINING ALCOHOL: NEVER

## 2024-03-27 ASSESSMENT — PAIN SCALES - GENERAL: PAINLEVEL_OUTOF10: 8

## 2024-03-27 ASSESSMENT — PAIN - FUNCTIONAL ASSESSMENT: PAIN_FUNCTIONAL_ASSESSMENT: 0-10

## 2024-03-28 VITALS
DIASTOLIC BLOOD PRESSURE: 87 MMHG | OXYGEN SATURATION: 91 % | BODY MASS INDEX: 35.79 KG/M2 | TEMPERATURE: 98.5 F | WEIGHT: 250 LBS | HEART RATE: 94 BPM | RESPIRATION RATE: 20 BRPM | SYSTOLIC BLOOD PRESSURE: 141 MMHG | HEIGHT: 70 IN

## 2024-03-28 LAB
ALBUMIN SERPL-MCNC: 4.9 G/DL (ref 3.5–5.2)
ALP SERPL-CCNC: 70 U/L (ref 40–129)
ALT SERPL-CCNC: 16 U/L (ref 5–41)
ANION GAP SERPL CALCULATED.3IONS-SCNC: 14 MMOL/L (ref 9–17)
AST SERPL-CCNC: 29 U/L
BILIRUB SERPL-MCNC: 0.8 MG/DL (ref 0.3–1.2)
BUN SERPL-MCNC: 17 MG/DL (ref 6–20)
CALCIUM SERPL-MCNC: 9.6 MG/DL (ref 8.6–10.4)
CHLORIDE SERPL-SCNC: 94 MMOL/L (ref 98–107)
CO2 SERPL-SCNC: 26 MMOL/L (ref 20–31)
CREAT SERPL-MCNC: 0.9 MG/DL (ref 0.7–1.2)
CRP SERPL HS-MCNC: 53.1 MG/L (ref 0–5)
GFR SERPL CREATININE-BSD FRML MDRD: >90 ML/MIN/1.73M2
GLUCOSE SERPL-MCNC: 93 MG/DL (ref 70–99)
POTASSIUM SERPL-SCNC: 3.8 MMOL/L (ref 3.7–5.3)
PROT SERPL-MCNC: 7.5 G/DL (ref 6.4–8.3)
SODIUM SERPL-SCNC: 134 MMOL/L (ref 135–144)

## 2024-03-28 RX ORDER — DOXYCYCLINE HYCLATE 100 MG
100 TABLET ORAL 2 TIMES DAILY
Qty: 14 TABLET | Refills: 0 | Status: SHIPPED | OUTPATIENT
Start: 2024-03-28 | End: 2024-04-04

## 2024-03-28 ASSESSMENT — ENCOUNTER SYMPTOMS
COUGH: 0
COLOR CHANGE: 1
NAUSEA: 0
VOMITING: 0

## 2024-03-28 NOTE — ED PROVIDER NOTES
EMERGENCY DEPARTMENT ENCOUNTER    Pt Name: Suraj Mullins  MRN: [id number]  Birthdate 1995  Date of evaluation: 3/27/24  CHIEF COMPLAINT       Chief Complaint   Patient presents with    Leg Pain     LLE, appears like possible cellulitis  Started about a week ago  Ambulatory, denies injuries. Has had this problem before and was deemed a \"blood infection\"     HISTORY OF PRESENT ILLNESS   Presenting with chief complaint of left leg pain over the last few days.  Patient denies any wound to the affected area.  He said is it is warm to the touch and swollen.  He is able to ambulate on it but says that it is painful to the touch.    The history is provided by the patient.           REVIEW OF SYSTEMS     Review of Systems   Constitutional:  Negative for chills and fever.   Eyes:  Negative for visual disturbance.   Respiratory:  Negative for cough.    Gastrointestinal:  Negative for nausea and vomiting.   Musculoskeletal:  Positive for arthralgias. Negative for gait problem and myalgias.   Skin:  Positive for color change.   Neurological:  Negative for dizziness, light-headedness and headaches.     PASTMEDICAL HISTORY     Past Medical History:   Diagnosis Date    Anxiety     Depression     History of substance abuse ([facility])      Past Problem List  Patient Active Problem List   Diagnosis Code    Fracture of left radius S52.92XA    Fracture of left ulna S52.202A    Cocaine use F14.90    Marijuana use F12.90    Opiate overdose ([facility]) T40.601A    Hypoxia R09.02    Opioid overdose, accidental or unintentional, initial encounter ([facility]) T40.2X1A     SURGICAL HISTORY       Past Surgical History:   Procedure Laterality Date    FOREARM SURGERY Left 4/12/2022    DISTAL RADIUS OPEN REDUCTION INTERNAL FIXATION  TRIMED, C-ARM, APPLICATION OF SPLINT performed by Hipolito Padilla DO at [facility] OR    HUMERUS FRACTURE SURGERY Right 09/22/2021    ORIF DISTAL HUMERUS performed by Gabriella Alanis MD at [facility] OR    KNEE SURGERY

## 2024-03-28 NOTE — ED NOTES
Registration called for nurse to front, pt had locked self in bathroom and was shouting   Unable to get an answer, security called to unlock door and ensure safety  Pt was found splashing water to face and continued shouting  Needle found in baby changing area, pt states it is not his  Pt appears under the influence of intoxicating substance  Security assessed pt and belongings and found no further sharp objects  Pt safely placed in room 4 and visible from nurses station

## 2024-03-28 NOTE — ED NOTES
ED staff was called to the [facility] restroom by registration for a patient locked in the restroom scream for help. Upon knocking on door there was no reply from patient locked in restroom. At this time security was called to unlock the restroom. Upon entering restroom staff found patient standing in front of the mirror with no shirt or shoes on and his pant legs rolled up to his knees. A needle was found stuck in the baby changing station that the patient stated was not his. Patient was escorted back to waiting room by security and clothing was put back on by patient.

## 2024-04-03 ENCOUNTER — HOSPITAL ENCOUNTER (EMERGENCY)
Age: 29
Discharge: HOME | End: 2024-04-03
Attending: EMERGENCY MEDICINE
Payer: MEDICAID

## 2024-04-03 ENCOUNTER — APPOINTMENT (OUTPATIENT)
Dept: VASCULAR LAB | Age: 29
End: 2024-04-03
Payer: MEDICAID

## 2024-04-03 ENCOUNTER — APPOINTMENT (OUTPATIENT)
Dept: GENERAL RADIOLOGY | Age: 29
End: 2024-04-03
Payer: MEDICAID

## 2024-04-03 VITALS
SYSTOLIC BLOOD PRESSURE: 128 MMHG | DIASTOLIC BLOOD PRESSURE: 69 MMHG | TEMPERATURE: 97.3 F | HEART RATE: 69 BPM | RESPIRATION RATE: 17 BRPM | OXYGEN SATURATION: 96 %

## 2024-04-03 DIAGNOSIS — L03.116 CELLULITIS OF LEFT LOWER EXTREMITY: Primary | ICD-10-CM

## 2024-04-03 LAB — D DIMER PPP FEU-MCNC: 1.37 UG/ML FEU (ref 0–0.57)

## 2024-04-03 PROCEDURE — 99284 EMERGENCY DEPT VISIT MOD MDM: CPT

## 2024-04-03 PROCEDURE — 73590 X-RAY EXAM OF LOWER LEG: CPT

## 2024-04-03 PROCEDURE — 6370000000 HC RX 637 (ALT 250 FOR IP): Performed by: STUDENT IN AN ORGANIZED HEALTH CARE EDUCATION/TRAINING PROGRAM

## 2024-04-03 PROCEDURE — 85379 FIBRIN DEGRADATION QUANT: CPT

## 2024-04-03 PROCEDURE — 93970 EXTREMITY STUDY: CPT

## 2024-04-03 RX ORDER — CEPHALEXIN 500 MG/1
500 CAPSULE ORAL 4 TIMES DAILY
Qty: 28 CAPSULE | Refills: 0 | Status: SHIPPED | OUTPATIENT
Start: 2024-04-03 | End: 2024-04-10

## 2024-04-03 RX ORDER — CEPHALEXIN 500 MG/1
500 CAPSULE ORAL ONCE
Status: COMPLETED | OUTPATIENT
Start: 2024-04-03 | End: 2024-04-03

## 2024-04-03 RX ADMIN — CEPHALEXIN 500 MG: 500 CAPSULE ORAL at 20:15

## 2024-04-03 ASSESSMENT — PAIN SCALES - GENERAL: PAINLEVEL_OUTOF10: 6

## 2024-04-03 ASSESSMENT — PAIN DESCRIPTION - LOCATION: LOCATION: LEG

## 2024-04-03 ASSESSMENT — PAIN DESCRIPTION - ORIENTATION: ORIENTATION: LEFT

## 2024-04-03 ASSESSMENT — PAIN - FUNCTIONAL ASSESSMENT: PAIN_FUNCTIONAL_ASSESSMENT: 0-10

## 2024-04-03 NOTE — ED NOTES
Pt to ED via triage with complaints of left lower leg pain and swelling. Pt states this began a week ago, and that he has been taking tylenol and motrin for pain. Pt denies any injuries current or past. Pt is on stretcher with stable vitals no other needs.

## 2024-04-03 NOTE — ED PROVIDER NOTES
[facility]  Emergency Department  Faculty Attestation     I performed a history and physical examination of the patient and discussed management with the resident. I reviewed the resident’s note and agree with the documented findings and plan of care. Any areas of disagreement are noted on the chart. I was personally present for the key portions of any procedures. I have documented in the chart those procedures where I was not present during the key portions. I have reviewed the emergency nurses triage note. I agree with the chief complaint, past medical history, past surgical history, allergies, medications, social and family history as documented unless otherwise noted below.    For Physician Assistant/ Nurse Practitioner cases/documentation I have personally evaluated this patient and have completed at least one if not all key elements of the E/M (history, physical exam, and MDM). Additional findings are as noted.    Preliminary note started at 6:04 PM EDT    Primary Care Physician:  No primary care provider on file.    Screenings:  [unfilled]    CHIEF COMPLAINT       Chief Complaint   Patient presents with    Leg Pain     left       RECENT VITALS:   /69   Pulse 69   Temp 97.3 °F (36.3 °C)   Resp 17   SpO2 96%     LABS:  Labs Reviewed   D-DIMER, QUANTITATIVE       Radiology  XR TIBIA FIBULA LEFT (2 VIEWS)   Final Result   Unremarkable radiographs of the left tibia and fibula.               Attending Physician Additional  Notes    Patient is had 1 week of discomfort in the left anterior shin without trauma.  There is slight warmth redness and swelling.  No posterior pain.  No swelling to the foot.  No history of venous thromboembolism or risk factors for this.  No chest pain shortness of breath or hemoptysis.  On exam he is nontoxic afebrile vital signs normal.  There is no bony deformity or tenderness.  Full range of the ankle.  Normal pulses.  Normal cap refill.   There is no calf tenderness.  No swelling to the foot.  He does however have some shyness and warmth and swelling to the left anterior shin.  There is no crepitus.  Compartments are soft.  He does have pain with passive plantarflexion and active dorsiflexion of the ankle.  Impression is possible cellulitis, less likely clot versus myositis.  Plan is simple analgesics, plain films, D-dimer, consider ultrasound if elevated D-dimer, follow-up to PCP, will start antibiotics such as Keflex.            Jose Enrique Clemente MD, FACEP  Attending Emergency  Physician                Jose Enrique Clemente MD  04/03/24 8844

## 2024-04-03 NOTE — ED PROVIDER NOTES
[facility] ED  Emergency Department Encounter  Emergency Medicine Resident     Pt Name:Suraj Mullins  MRN: [id number]  Birthdate 1995  Date of evaluation: 4/3/24  PCP:  No primary care provider on file.  Note Started: 3:59 PM EDT      CHIEF COMPLAINT       Chief Complaint   Patient presents with    Leg Pain     left       HISTORY OF PRESENT ILLNESS  (Location/Symptom, Timing/Onset, Context/Setting, Quality, Duration, Modifying Factors, Severity.)      Suraj Mullins is a 29 y.o. male who presents with left lower extremity pain and swelling.  Patient states for the past week he has been having pain and swelling on his left lower leg near his shin.  Denies any injuries.  No previous injuries to this leg, no hardware in place.  No fevers or chills.  Still able to ambulate without difficulty.  Has not been trying anything at home for his symptoms.  No previous history of blood clots.  Not on any blood thinners.    PAST MEDICAL / SURGICAL / SOCIAL / FAMILY HISTORY      has a past medical history of Anxiety, Depression, and History of substance abuse (HCC).       has a past surgical history that includes knee surgery; laparoscopic appendectomy (N/A, 01/08/2020); Humerus fracture surgery (Right, 09/22/2021); and Forearm surgery (Left, 4/12/2022).      Social History     Socioeconomic History    Marital status: Single     Spouse name: Not on file    Number of children: Not on file    Years of education: Not on file    Highest education level: Not on file   Occupational History    Not on file   Tobacco Use    Smoking status: Every Day     Current packs/day: 0.25     Types: Cigarettes    Smokeless tobacco: Never   Vaping Use    Vaping Use: Former    Substances: Nicotine    Devices: Disposable   Substance and Sexual Activity    Alcohol use: Not Currently     Comment: none since 2017    Drug use: Yes     Types: Opiates , Marijuana (Weed)     Comment: fentanyl    Sexual activity: Not on  present.      Mental Status: He is alert and oriented to person, place, and time.      Cranial Nerves: No cranial nerve deficit.      Comments: Strength 5-5 in bilateral lower extremities, sensation intact   Psychiatric:         Mood and Affect: Mood normal.           DDX/DIAGNOSTIC RESULTS / EMERGENCY DEPARTMENT COURSE / [facility]     Medical Decision Making  DDx: Cellulitis, fracture, musculoskeletal pain, DVT    29-year-old male presents with left lower extremity swelling and pain.  Noticed it a few days ago, has been getting progressively worse.  No fevers or chills.  Able to walk without difficulty.  No previous injuries to this leg.  No previous blood clots.  Patient appears well on initial evaluation, afebrile, stable vital signs.  Mild swelling noted just proximal to left ankle, tender to palpation, no posterior calf tenderness.  Will plan to obtain x-ray imaging to rule out any underlying abnormalities.  Due to asymmetric leg swelling will obtain D-dimer.  If elevated will obtain venous Dopplers.  Will plan to cover with antibiotics for cellulitis at a minimum.    Amount and/or Complexity of Data Reviewed  Labs: ordered. Decision-making details documented in ED Course.  Radiology: ordered. Decision-making details documented in ED Course.    Risk  Prescription drug management.        EKG  None    All EKG's are interpreted by the Emergency Department Physician who either signs or Co-signs this chart in the absence of a cardiologist.    EMERGENCY DEPARTMENT COURSE:      ED Course as of 04/04/24 0133   Wed Apr 03, 2024   1755 XR TIBIA FIBULA LEFT (2 VIEWS)  IMPRESSION:  Unremarkable radiographs of the left tibia and fibula.   [AB]   1823 D-Dimer, Quant(!): 1.37  Will add on venous Dopplers [AB]   2014 Venous Dopplers unremarkable.  Will start patient on antibiotics for concern for cellulitis.  Will plan on discharge at this time with close outpatient follow-up with primary care.  Given strict return precautions.

## 2024-04-04 PROCEDURE — 93970 EXTREMITY STUDY: CPT | Performed by: STUDENT IN AN ORGANIZED HEALTH CARE EDUCATION/TRAINING PROGRAM

## 2024-04-04 NOTE — DISCHARGE INSTRUCTIONS
We evaluated you for your leg swelling.  Your ultrasound did not show any blood clots.  Your x-ray imaging was unremarkable.  You most likely have a skin infection.  Please take the antibiotics as prescribed.    Please follow closely with your primary care doctor.    Please return to the emergency department you develop any worsening or concerning symptoms.

## 2024-06-18 ENCOUNTER — HOSPITAL ENCOUNTER (EMERGENCY)
Age: 29
Discharge: HOME | End: 2024-06-19
Attending: EMERGENCY MEDICINE
Payer: MEDICAID

## 2024-06-18 VITALS
BODY MASS INDEX: 34.36 KG/M2 | SYSTOLIC BLOOD PRESSURE: 116 MMHG | DIASTOLIC BLOOD PRESSURE: 67 MMHG | TEMPERATURE: 99.2 F | WEIGHT: 240 LBS | HEART RATE: 99 BPM | RESPIRATION RATE: 12 BRPM | OXYGEN SATURATION: 97 % | HEIGHT: 70 IN

## 2024-06-18 DIAGNOSIS — F11.90 OPIOID USE DISORDER: Primary | ICD-10-CM

## 2024-06-18 PROCEDURE — 99283 EMERGENCY DEPT VISIT LOW MDM: CPT

## 2024-06-18 RX ORDER — NALOXONE HYDROCHLORIDE 0.4 MG/ML
0.4 INJECTION, SOLUTION INTRAMUSCULAR; INTRAVENOUS; SUBCUTANEOUS ONCE
Status: DISCONTINUED | OUTPATIENT
Start: 2024-06-18 | End: 2024-06-19 | Stop reason: HOSPADM

## 2024-06-18 ASSESSMENT — PAIN - FUNCTIONAL ASSESSMENT: PAIN_FUNCTIONAL_ASSESSMENT: 0-10

## 2024-06-18 ASSESSMENT — PAIN SCALES - GENERAL: PAINLEVEL_OUTOF10: 8

## 2024-06-19 ASSESSMENT — ENCOUNTER SYMPTOMS
NAUSEA: 0
CHEST TIGHTNESS: 0
WHEEZING: 0
DIARRHEA: 0
COLOR CHANGE: 0
ABDOMINAL PAIN: 0
SHORTNESS OF BREATH: 0
BACK PAIN: 0
VOMITING: 0

## 2024-06-19 NOTE — DISCHARGE INSTRUCTIONS
Seen in the emergency department for opioid use disorder.  Received Narcan 0.4 mg.  Medically stable to go to [facility].  Social work involved.  Arranging your acceptance there.  Please return to the emergency department if you develop chest pain, shortness of breath, episodes of passing out, severe headaches, or any other concerning symptoms.  Please do your best to refrain from using opioids. Given Narcan to go kit.

## 2024-06-19 NOTE — ED NOTES
Pt arrived to ED through EMS with c/o of snorting fentanyl   Pt stated he was in recovery and relapsed  Pt stated he also smoked marijuana today   Pt connected to monitor, bp and pulse ox  Iv access started with labs drawn  Pt appears to be in no acute distress at this time

## 2024-06-19 NOTE — ED NOTES
Patient is presenting to ED for snorting Fentanyl and using Marijuana. Patient stated he was at [facility] for 1 week then [facility] for 2 months. Patient stated he was discharged from the program one month ago and was to start IOP. Patient stated he relapsed 2 weeks ago and has used about 2 grams of Fentanyl. Patient denied any overdoses, blackouts or injuries. He stated he is tired after using. Patient requested to go to [facility] for an assessment into detox/recovery.  SW called [facility] and they are willing to complete as assessment with him. [employer] will be scheduled to transport.

## 2024-06-21 NOTE — ED PROVIDER NOTES
[facility]   Emergency Department  Faculty Attestation       I performed a history and physical examination of the patient and discussed management with the resident. I reviewed the resident’s note and agree with the documented findings including all diagnostic interpretations and plan of care. Any areas of disagreement are noted on the chart. I was personally present for the key portions of any procedures. I have documented in the chart those procedures where I was not present during the key portions. I have reviewed the emergency nurses triage note. I agree with the chief complaint, past medical history, past surgical history, allergies, medications, social and family history as documented unless otherwise noted below.  For Physician Assistant/ Nurse Practitioner cases/documentation I have personally evaluated this patient and have completed at least one if not all key elements of the E/M (history, physical exam, and MDM). Additional findings are as noted.    Patient Name: Suraj Mullins  MRN: [id number]  : 1995  Primary Care Physician: No primary care provider on file.    Date of evaluationa: 2024   Note Started: 8:46 AM EDT    Pertinent Comments     Chief Complaint:   Chief Complaint   Patient presents with    Addiction Problem        Initial vitals: (If not listed, please see nursing documentation)  ED Triage Vitals   BP Temp Temp Source Pulse Respirations SpO2 Height Weight - Scale   24 2311 24 2309 24 2309 24 2309 24 2309 24 2309 24 2309 24 2309   139/87 99.2 °F (37.3 °C) Oral (!) 112 26 99 % 1.778 m (5' 10\") 108.9 kg (240 lb)        HPI/PE/Impression:  This is a 29 y.o. male who presents to the Emergency Department w/ fentanyl/opioid OD. On my exam patient has no complaints and is requesting that he go to [facility] for detox.  On exma he is awake and alert in no acute distress. NC/AT. Heart sounds regular and ambulating 
Prescriptions    NALOXONE (NARCAN TO-GO)    1 each by Nasal route once for 1 dose       Bobo Hernandes DO  Emergency Medicine Resident    (Please note that portions of this note were completed with a voice recognition program.  Efforts were made to edit the dictations but occasionally words are mis-transcribed.)

## 2024-07-11 ENCOUNTER — HOSPITAL ENCOUNTER (EMERGENCY)
Age: 29
Discharge: HOME | End: 2024-07-12
Attending: EMERGENCY MEDICINE
Payer: MEDICAID

## 2024-07-11 DIAGNOSIS — F19.90 DRUG USE: Primary | ICD-10-CM

## 2024-07-11 LAB
ALBUMIN SERPL-MCNC: 4.9 G/DL (ref 3.5–5.2)
ALBUMIN/GLOB SERPL: 2 {RATIO} (ref 1–2.5)
ALP SERPL-CCNC: 77 U/L (ref 40–129)
ALT SERPL-CCNC: 20 U/L (ref 10–50)
ANION GAP SERPL CALCULATED.3IONS-SCNC: 18 MMOL/L (ref 9–16)
AST SERPL-CCNC: 33 U/L (ref 10–50)
BASOPHILS # BLD: 0.05 K/UL (ref 0–0.2)
BASOPHILS NFR BLD: 1 % (ref 0–2)
BILIRUB SERPL-MCNC: 0.7 MG/DL (ref 0–1.2)
BUN SERPL-MCNC: 21 MG/DL (ref 6–20)
CALCIUM SERPL-MCNC: 9.7 MG/DL (ref 8.6–10.4)
CHLORIDE SERPL-SCNC: 100 MMOL/L (ref 98–107)
CK SERPL-CCNC: 411 U/L (ref 39–308)
CO2 SERPL-SCNC: 21 MMOL/L (ref 20–31)
CREAT SERPL-MCNC: 1.1 MG/DL (ref 0.7–1.2)
EOSINOPHIL # BLD: 0.31 K/UL (ref 0–0.44)
EOSINOPHILS RELATIVE PERCENT: 4 % (ref 1–4)
ERYTHROCYTE [DISTWIDTH] IN BLOOD BY AUTOMATED COUNT: 13.1 % (ref 11.8–14.4)
GFR, ESTIMATED: >90 ML/MIN/1.73M2
GLUCOSE BLD-MCNC: 74 MG/DL (ref 75–110)
GLUCOSE SERPL-MCNC: 69 MG/DL (ref 74–99)
HCT VFR BLD AUTO: 39.7 % (ref 40.7–50.3)
HGB BLD-MCNC: 13.3 G/DL (ref 13–17)
IMM GRANULOCYTES # BLD AUTO: <0.03 K/UL (ref 0–0.3)
IMM GRANULOCYTES NFR BLD: 0 %
LYMPHOCYTES NFR BLD: 2.43 K/UL (ref 1.1–3.7)
LYMPHOCYTES RELATIVE PERCENT: 27 % (ref 24–43)
MCH RBC QN AUTO: 28.8 PG (ref 25.2–33.5)
MCHC RBC AUTO-ENTMCNC: 33.5 G/DL (ref 28.4–34.8)
MCV RBC AUTO: 85.9 FL (ref 82.6–102.9)
MONOCYTES NFR BLD: 0.85 K/UL (ref 0.1–1.2)
MONOCYTES NFR BLD: 10 % (ref 3–12)
MYOGLOBIN SERPL-MCNC: 73 NG/ML (ref 28–72)
NEUTROPHILS NFR BLD: 58 % (ref 36–65)
NEUTS SEG NFR BLD: 5.25 K/UL (ref 1.5–8.1)
NRBC BLD-RTO: 0 PER 100 WBC
PLATELET # BLD AUTO: 186 K/UL (ref 138–453)
PMV BLD AUTO: 10.9 FL (ref 8.1–13.5)
POTASSIUM SERPL-SCNC: 3.9 MMOL/L (ref 3.7–5.3)
PROT SERPL-MCNC: 7.8 G/DL (ref 6.6–8.7)
RBC # BLD AUTO: 4.62 M/UL (ref 4.21–5.77)
SODIUM SERPL-SCNC: 139 MMOL/L (ref 136–145)
WBC OTHER # BLD: 8.9 K/UL (ref 3.5–11.3)

## 2024-07-11 PROCEDURE — 82550 ASSAY OF CK (CPK): CPT

## 2024-07-11 PROCEDURE — 6360000002 HC RX W HCPCS

## 2024-07-11 PROCEDURE — 85025 COMPLETE CBC W/AUTO DIFF WBC: CPT

## 2024-07-11 PROCEDURE — 2580000003 HC RX 258

## 2024-07-11 PROCEDURE — 93005 ELECTROCARDIOGRAM TRACING: CPT

## 2024-07-11 PROCEDURE — 99284 EMERGENCY DEPT VISIT MOD MDM: CPT

## 2024-07-11 PROCEDURE — 82947 ASSAY GLUCOSE BLOOD QUANT: CPT

## 2024-07-11 PROCEDURE — 80053 COMPREHEN METABOLIC PANEL: CPT

## 2024-07-11 PROCEDURE — 96376 TX/PRO/DX INJ SAME DRUG ADON: CPT

## 2024-07-11 PROCEDURE — 96374 THER/PROPH/DIAG INJ IV PUSH: CPT

## 2024-07-11 PROCEDURE — 83874 ASSAY OF MYOGLOBIN: CPT

## 2024-07-11 RX ORDER — SODIUM CHLORIDE, SODIUM LACTATE, POTASSIUM CHLORIDE, AND CALCIUM CHLORIDE .6; .31; .03; .02 G/100ML; G/100ML; G/100ML; G/100ML
1000 INJECTION, SOLUTION INTRAVENOUS ONCE
Status: COMPLETED | OUTPATIENT
Start: 2024-07-11 | End: 2024-07-12

## 2024-07-11 RX ORDER — LORAZEPAM 0.5 MG/1
1 TABLET ORAL ONCE
Status: DISCONTINUED | OUTPATIENT
Start: 2024-07-11 | End: 2024-07-11

## 2024-07-11 RX ORDER — LORAZEPAM 2 MG/ML
1 INJECTION INTRAMUSCULAR ONCE
Status: COMPLETED | OUTPATIENT
Start: 2024-07-11 | End: 2024-07-11

## 2024-07-11 RX ORDER — LORAZEPAM 2 MG/ML
INJECTION INTRAMUSCULAR
Status: COMPLETED
Start: 2024-07-11 | End: 2024-07-11

## 2024-07-11 RX ORDER — LORAZEPAM 2 MG/ML
2 INJECTION INTRAMUSCULAR ONCE
Status: COMPLETED | OUTPATIENT
Start: 2024-07-11 | End: 2024-07-11

## 2024-07-11 RX ADMIN — LORAZEPAM 1 MG: 2 INJECTION INTRAMUSCULAR; INTRAVENOUS at 21:44

## 2024-07-11 RX ADMIN — Medication 2 MG: at 22:30

## 2024-07-11 RX ADMIN — LORAZEPAM 2 MG: 2 INJECTION INTRAMUSCULAR at 22:30

## 2024-07-11 RX ADMIN — SODIUM CHLORIDE, POTASSIUM CHLORIDE, SODIUM LACTATE AND CALCIUM CHLORIDE 1000 ML: 600; 310; 30; 20 INJECTION, SOLUTION INTRAVENOUS at 21:46

## 2024-07-12 VITALS
OXYGEN SATURATION: 98 % | HEART RATE: 58 BPM | BODY MASS INDEX: 31.57 KG/M2 | WEIGHT: 220 LBS | TEMPERATURE: 98.5 F | DIASTOLIC BLOOD PRESSURE: 72 MMHG | SYSTOLIC BLOOD PRESSURE: 127 MMHG | RESPIRATION RATE: 14 BRPM

## 2024-07-12 LAB
EKG ATRIAL RATE: 97 BPM
EKG P AXIS: 31 DEGREES
EKG P-R INTERVAL: 112 MS
EKG Q-T INTERVAL: 358 MS
EKG QRS DURATION: 94 MS
EKG QTC CALCULATION (BAZETT): 454 MS
EKG R AXIS: 47 DEGREES
EKG T AXIS: 57 DEGREES
EKG VENTRICULAR RATE: 97 BPM
GLUCOSE BLD-MCNC: 78 MG/DL (ref 75–110)

## 2024-07-12 PROCEDURE — 82947 ASSAY GLUCOSE BLOOD QUANT: CPT

## 2024-07-12 ASSESSMENT — ENCOUNTER SYMPTOMS
RHINORRHEA: 0
SHORTNESS OF BREATH: 0
COUGH: 0
VOMITING: 0
ABDOMINAL PAIN: 0
NAUSEA: 0

## 2024-07-12 ASSESSMENT — PAIN - FUNCTIONAL ASSESSMENT: PAIN_FUNCTIONAL_ASSESSMENT: 0-10

## 2024-07-12 ASSESSMENT — PAIN SCALES - GENERAL: PAINLEVEL_OUTOF10: 2
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
## 2024-07-12 NOTE — ED NOTES
Arrived patient to ED escorted by EMS presents with anxiety. As per EMS patient was seen walking on the street wandering. Patient states that he is having anxiety because of recent loss of close friend. Patient admits that he is using drugs most of the time, patient states that he uses weeds, cocaine and fentanyl. Patient has a history of drug abuse, depression and anxiety. Patient denies any shortness of breath, denies any chest pain, denies any suicidal/ homicidal ideation. Hooked to cardiac monitor at 109bpm. Bloods collected, bed on lowest position. Call light provided.

## 2024-07-12 NOTE — ED PROVIDER NOTES
[facility]   Emergency Department  Emergency Medicine Attending Sign-out   Note started: 8:26 AM EDT    Care of Suraj Mullins was assumed from previous attending Dr. Bhat at 11 PM and is being seen for Addiction Problem and Anxiety  .  The patient's initial evaluation and plan have been discussed with the prior provider who initially evaluated the patient.     Attestation  I was available and discussed any additional care issues that arose and coordinated the management plans with the resident(s) caring for the patient during my duty period. Any areas of disagreement with resident's documentation of care or procedures are noted on the chart. I was personally present for the key portions of any/all procedures, during my duty period. I have documented in the chart those procedures where I was not present during the key portions.     BRIEF PATIENT SUMMARY/MDM COURSE PER INITIAL PROVIDER:   RECENT VITALS:     Temp: 98.5 °F (36.9 °C),  Pulse: 58, Respirations: 14, BP: 127/72, SpO2: 98 %    This patient is a 29 y.o. Male with found wandering around outside.  Initially was more oriented, but then was found in the room yelling at things, responding to internal stimuli, appeared to be under the influence of a sympathomimetic substance.  Given Ativan and now patient sleeping.  Awaiting patient reevaluation once more awake and alert.    DIAGNOSTICS/MEDICATIONS:     MEDICATIONS GIVEN:  ED Medication Orders (From admission, onward)      Start Ordered     Status Ordering Provider    07/11/24 2245 07/11/24 2232  LORazepam (ATIVAN) injection 2 mg  ONCE         Last MAR action: Given - by VADIM LEDEZMA on 07/11/24 at 2230 VIOLETTA NUNEZ    07/11/24 2130 07/11/24 2126  lactated ringers bolus 1,000 mL  ONCE         Last MAR action: Stopped - by DEREK BELLAMY on 07/12/24 at 0022 VIOLETTA NUNEZ    07/11/24 2130 07/11/24 2127  LORazepam (ATIVAN) injection 1 mg  ONCE

## 2024-07-12 NOTE — ED NOTES
Pt up at bedside, unsteady on his feet mumbling random phrases. Dr. Bhat and Dr. Stratton at bedside with verbal order for 2mg IV Ativan.

## 2024-07-12 NOTE — ED PROVIDER NOTES
[facility] ED     Emergency Department     Faculty Attestation    I performed a history and physical examination of the patient and discussed management with the resident. I reviewed the resident’s note and agree with the documented findings and plan of care. Any areas of disagreement are noted on the chart. I was personally present for the key portions of any procedures. I have documented in the chart those procedures where I was not present during the key portions. I have reviewed the emergency nurses triage note. I agree with the chief complaint, past medical history, past surgical history, allergies, medications, social and family history as documented unless otherwise noted below. For Physician Assistant/ Nurse Practitioner cases/documentation I have personally evaluated this patient and have completed at least one if not all key elements of the E/M (history, physical exam, and MDM). Additional findings are as noted.    Note Started: 9:46 PM EDT    Patient arrived by EMS for altered mental status.  Patient was per report independent history found wandering the street.  On arrival patient was conversant, cooperative.  Patient does admit to multiple polysubstance abuse states he is actually supposed to be going to a treatment facility tomorrow.  Normal pupils normal speech.  Will observe probable discharge    10:32 PM EDT  Called back to bedside patient extremely agitated responding to internal stimuli, stating the corner yelling it something on the floor smacking him self in the thighs.  He was redirected back to the bed will give additional Ativan and will expand workup monitor closely    EKG interpretation: Sinus rhythm 97 normal intervals normal axis no acute ST or T changes no acute findings      Critical Care     none    Jose Enrique Bhat MD, FACEP, FAAEM  Attending Emergency  Physician           Jose Enrique Bhat MD  07/11/24 9591

## 2024-07-12 NOTE — ED PROVIDER NOTES
[facility] ED  Emergency Department Encounter  Emergency Medicine Resident     Pt Name:Suraj Mullins  MRN: [id number]  Birthdate 1995  Date of evaluation: 7/11/24  PCP:  No primary care provider on file.  Note Started: 9:14 PM EDT      CHIEF COMPLAINT       Chief Complaint   Patient presents with    Addiction Problem    Anxiety       HISTORY OF PRESENT ILLNESS  (Location/Symptom, Timing/Onset, Context/Setting, Quality, Duration, Modifying Factors, Severity.)      Suraj Mullins is a 29 y.o. male who presents with concerns that he is too high.  Patient was brought in by EMS after seeing walking in traffic.  States that he was recently clean for approximately 4 months but relapsed after friend's death.  Does admit to using fentanyl yesterday but no drug use today.  Denies alcohol use.  His multidrug user with a history of using cocaine, amphetamines, opioids and marijuana.  States that he use fentanyl yesterday but unsure what was actually ended.  Does have things set up to go to a sober house tomorrow.  Currently no complaints with no chest pain or shortness of breath minimal agitation.    PAST MEDICAL / SURGICAL / SOCIAL / FAMILY HISTORY      has a past medical history of Anxiety, Depression, and History of substance abuse (HCC).     has a past surgical history that includes knee surgery; laparoscopic appendectomy (N/A, 01/08/2020); Humerus fracture surgery (Right, 09/22/2021); and Forearm surgery (Left, 4/12/2022).    Social History     Socioeconomic History    Marital status: Single     Spouse name: Not on file    Number of children: Not on file    Years of education: Not on file    Highest education level: Not on file   Occupational History    Not on file   Tobacco Use    Smoking status: Every Day     Current packs/day: 0.25     Types: Cigarettes    Smokeless tobacco: Never   Vaping Use    Vaping Use: Former    Substances: Nicotine    Devices: Disposable   Substance and

## 2024-07-12 NOTE — DISCHARGE INSTRUCTIONS
You are seen in the emergency department for drug use.  You were going to sober living today.  Please go directly there from the emergency department.    PLEASE RETURN TO THE EMERGENCY DEPARTMENT IMMEDIATELY for worsening symptoms of increasing pain, shortness of breath, feeling of your heart fluttering or racing, swelling to your feet, unable to lay flat, or if you develop any concerning symptoms such as: high fever not relieved by acetaminophen (Tylenol) and/or ibuprofen (Motrin / Advil), chills, persistent nausea and/or vomiting, loss of consciousness, numbness, weakness or tingling in the arms or legs or change in color of the extremities, changes in mental status, persistent headache, blurry vision, loss of bladder / bowel control, unable to follow up with your physician, or other any other care or concern.

## 2024-07-15 LAB
EKG ATRIAL RATE: 97 BPM
EKG P AXIS: 31 DEGREES
EKG P-R INTERVAL: 112 MS
EKG Q-T INTERVAL: 358 MS
EKG QRS DURATION: 94 MS
EKG QTC CALCULATION (BAZETT): 454 MS
EKG R AXIS: 47 DEGREES
EKG T AXIS: 57 DEGREES
EKG VENTRICULAR RATE: 97 BPM

## 2024-11-17 ENCOUNTER — HOSPITAL ENCOUNTER (EMERGENCY)
Age: 29
Discharge: HOME | End: 2024-11-17
Attending: EMERGENCY MEDICINE
Payer: MEDICAID

## 2024-11-17 VITALS
WEIGHT: 220 LBS | HEART RATE: 86 BPM | RESPIRATION RATE: 20 BRPM | DIASTOLIC BLOOD PRESSURE: 86 MMHG | BODY MASS INDEX: 31.5 KG/M2 | OXYGEN SATURATION: 100 % | TEMPERATURE: 98.4 F | SYSTOLIC BLOOD PRESSURE: 157 MMHG | HEIGHT: 70 IN

## 2024-11-17 DIAGNOSIS — H10.9 CONJUNCTIVITIS OF LEFT EYE, UNSPECIFIED CONJUNCTIVITIS TYPE: Primary | ICD-10-CM

## 2024-11-17 PROCEDURE — 6370000000 HC RX 637 (ALT 250 FOR IP): Performed by: NURSE PRACTITIONER

## 2024-11-17 PROCEDURE — 99283 EMERGENCY DEPT VISIT LOW MDM: CPT

## 2024-11-17 RX ORDER — TETRACAINE HYDROCHLORIDE 5 MG/ML
2 SOLUTION OPHTHALMIC ONCE
Status: COMPLETED | OUTPATIENT
Start: 2024-11-17 | End: 2024-11-17

## 2024-11-17 RX ORDER — OFLOXACIN 3 MG/ML
2 SOLUTION/ DROPS OPHTHALMIC 4 TIMES DAILY
Qty: 5 ML | Refills: 0 | Status: SHIPPED | OUTPATIENT
Start: 2024-11-17 | End: 2024-11-27

## 2024-11-17 RX ADMIN — FLUORESCEIN SODIUM 1 MG: 1 STRIP OPHTHALMIC at 18:08

## 2024-11-17 RX ADMIN — TETRACAINE HYDROCHLORIDE 2 DROP: 5 SOLUTION OPHTHALMIC at 18:08

## 2024-11-17 ASSESSMENT — PAIN - FUNCTIONAL ASSESSMENT: PAIN_FUNCTIONAL_ASSESSMENT: 0-10

## 2024-11-17 ASSESSMENT — PAIN SCALES - GENERAL: PAINLEVEL_OUTOF10: 8

## 2024-11-17 ASSESSMENT — VISUAL ACUITY
OU: 20/40
OS: 20/70
OD: 20/70

## 2024-11-17 NOTE — ED PROVIDER NOTES
eMERGENCY dEPARTMENT  Attending Physician Attestation     Pt Name: Suraj Mullins  MRN: [id number]  Birthdate 1995  Date of evaluation: 11/17/24     Suraj Mullins is a 29 y.o. male with CC: Eye Pain (Left, when woke up this am, feels likea  FB. Usually wears glasses)      Based on the medical record the care appears appropriate.  I was personally available for consultation in the Emergency Department.    Eleno Sweeney DO  Attending Emergency Physician                  Eleno Sweeney DO  11/17/24 2031

## 2024-11-17 NOTE — ED PROVIDER NOTES
[facility] ED  eMERGENCY dEPARTMENT eNCOUnter      Pt Name: Suraj Mullins  MRN: [id number]  Birthdate 1995  Date of evaluation: 11/17/2024  Provider: CHIVO Mcmanus CNP    CHIEF COMPLAINT       Chief Complaint   Patient presents with    Eye Pain     Left, when woke up this am, feels likea  FB. Usually wears glasses         HISTORY OF PRESENT ILLNESS  (Location/Symptom, Timing/Onset, Context/Setting, Quality, Duration, Modifying Factors, Severity.)   Suraj Mullins is a 29 y.o. male who presents to the emergency department. C/o left eye irritation, erythema, drainage. Onset was this morning upon waking. Denies fever, chills, injury, vision changes. He does not wear contacts. Rates his pain 8/10.      Nursing Notes were reviewed.    ALLERGIES     Shellfish allergy and Shellfish-derived products    CURRENT MEDICATIONS       Discharge Medication List as of 11/17/2024  5:38 PM          PAST MEDICAL HISTORY         Diagnosis Date    Anxiety     Depression     History of substance abuse (HCC)        SURGICAL HISTORY           Procedure Laterality Date    FOREARM SURGERY Left 4/12/2022    DISTAL RADIUS OPEN REDUCTION INTERNAL FIXATION  TRIMED, C-ARM, APPLICATION OF SPLINT performed by Hipolito Padilla DO at [facility] OR    HUMERUS FRACTURE SURGERY Right 09/22/2021    ORIF DISTAL HUMERUS performed by Gabriella Alanis MD at [facility] OR    KNEE SURGERY      LAPAROSCOPIC APPENDECTOMY N/A 01/08/2020    APPENDECTOMY LAPAROSCOPIC performed by Ijeoma Asher MD at [facility] OR         FAMILY HISTORY           Problem Relation Age of Onset    Hypertension Other      Family Status   Relation Name Status    Other  (Not Specified)   No partnership data on file        SOCIAL HISTORY      reports that he has been smoking cigarettes. He has never used smokeless tobacco. He reports that he does not currently use alcohol. He reports current drug use. Drugs: Opiates  and Marijuana

## 2024-11-17 NOTE — ED NOTES
Pt presents to ER from home due to EYE pain.Pt states Pt states he woke up today with left eye irration.Pt's left eye is pink and watery. Pt is A/O x 4, equal chest expansion with non labored breathing, wheels locked, bed in lowest position,call light in reach.

## (undated) DEVICE — SOLUTION ANTIFOG VIS SYS CLEARIFY LAPSCP

## (undated) DEVICE — INTENDED FOR TISSUE SEPARATION, AND OTHER PROCEDURES THAT REQUIRE A SHARP SURGICAL BLADE TO PUNCTURE OR CUT.: Brand: BARD-PARKER ® CARBON RIB-BACK BLADES

## (undated) DEVICE — GLOVE SURG SZ 65 THK91MIL LTX FREE SYN POLYISOPRENE

## (undated) DEVICE — ADHESIVE SKIN CLOSURE TOP 36 CC HI VISC DERMBND MINI

## (undated) DEVICE — SUTURE VCRL SZ 0 L27IN ABSRB UD L36MM CT-1 1/2 CIR J260H

## (undated) DEVICE — SOLUTION SCRB 4OZ 4% CHG H2O AIDED FOR PREOPERATIVE SKIN

## (undated) DEVICE — DISPOSABLE LAPAROSCOPIC CORDS, 1 PER POUCH: Brand: A&E MEDICAL / DISPOSABLE LAPAROSCOPIC CORDS

## (undated) DEVICE — DRAPE,REIN 53X77,STERILE: Brand: MEDLINE

## (undated) DEVICE — CHLORAPREP 26ML ORANGE

## (undated) DEVICE — BANDAGE COBAN 4 IN COMPR W4INXL5YD FOAM COHESIVE QUIK STK SELF ADH SFT

## (undated) DEVICE — TROCAR: Brand: KII FIOS FIRST ENTRY

## (undated) DEVICE — STOCKINETTE,IMPERVIOUS,12X48,STERILE: Brand: MEDLINE

## (undated) DEVICE — TOTAL TRAY, 16FR 10ML SIL FOLEY, URN: Brand: MEDLINE

## (undated) DEVICE — APPLICATOR MEDICATED 26 CC SOLUTION HI LT ORNG CHLORAPREP

## (undated) DEVICE — SPONGE LAP W18XL18IN WHT COT 4 PLY FLD STRUNG RADPQ DISP ST

## (undated) DEVICE — CORD ES L12FT BPLR FRCP

## (undated) DEVICE — STERILE POLYISOPRENE POWDER-FREE SURGICAL GLOVES WITH EMOLLIENT COATING: Brand: PROTEXIS

## (undated) DEVICE — SUTURE VCRL SZ 2-0 L27IN ABSRB UD L22MM X-1 1/2 CIR REV CUT J459H

## (undated) DEVICE — DRESSING FOAM W4XL10IN AG SIL ADH ANTIMIC POSTOP OPTIFOAM

## (undated) DEVICE — BAG SPEC REM 224ML W4XL6IN DIA10MM 1 HND GYN DISP ENDOPCH

## (undated) DEVICE — SUTURE STRATAFIX SPRL SZ 3-0 L5IN ABSRB UD FS L26MM 3/8 CIR SXMP2B411

## (undated) DEVICE — GOWN,SIRUS,NONRNF,SETINSLV,XL,20/CS: Brand: MEDLINE

## (undated) DEVICE — GLOVE ORANGE PI 8   MSG9080

## (undated) DEVICE — BNDG,ELSTC,MATRIX,STRL,4"X5YD,LF,HOOK&LP: Brand: MEDLINE

## (undated) DEVICE — GOWN,AURORA,NONREINFORCED,LARGE: Brand: MEDLINE

## (undated) DEVICE — BIT DRL L80MM DIA2.3MM FLUT DISP

## (undated) DEVICE — Device

## (undated) DEVICE — RELOAD STPL L45MM H1-2.6MM MESENTERY THN TISS WHT GRIPPING

## (undated) DEVICE — SINGLE USE DEVICE INTENDED TO COVER EXPOSED ENDS OF ORTHOPEDIC PIN AND K-WIRES TO HELP PROTECT THE EXPOSED WIRE FROM SNAGGING ON CLOTHING.: Brand: OXBORO™ PIN COVER

## (undated) DEVICE — K WIRE FIX L100MM DIA1.1MM S STL DORS HK PLT
Type: IMPLANTABLE DEVICE | Site: WRIST | Status: NON-FUNCTIONAL
Removed: 2022-04-12

## (undated) DEVICE — GLOVE,EXAM,NITRILE,RESTORE,OAT SENSE,L: Brand: MEDLINE

## (undated) DEVICE — ADHESIVE SKIN CLSR 0.7ML TOP DERMBND ADV

## (undated) DEVICE — GOWN,AURORA,NONRNF,XL,30/CS: Brand: MEDLINE

## (undated) DEVICE — GLOVE SURG SZ 6 THK91MIL LTX FREE SYN POLYISOPRENE ANTI

## (undated) DEVICE — GAUZE,SPONGE,FLUFF,6"X6.75",STRL,5/TRAY: Brand: MEDLINE

## (undated) DEVICE — CONNECTOR,TUBING,5-IN-1,NON-STERILE: Brand: MEDLINE INDUSTRIES, INC.

## (undated) DEVICE — GLOVE ORANGE PI 7   MSG9070

## (undated) DEVICE — STAPLER INT XL 12MM UNIV TI DISP ENDO GIA

## (undated) DEVICE — BLADE ES ELASTOMERIC COAT INSUL DURABLE BEND UPTO 90DEG

## (undated) DEVICE — DRAPE,U/ SHT,SPLIT,PLAS,STERIL: Brand: MEDLINE

## (undated) DEVICE — C-ARM: Brand: UNBRANDED

## (undated) DEVICE — SPLINT ORTH W3XL15IN PLSTR OF PARIS LO EXOTHERM SMOOTH

## (undated) DEVICE — Z DISCONTINUED USE 2272124 DRAPE SURG XL N INVASIVE 2 LAYR DISP

## (undated) DEVICE — MITT SURG PREP L ADH DISPOSABLE

## (undated) DEVICE — PADDING,UNDERCAST,COTTON, 4"X4YD STERILE: Brand: MEDLINE

## (undated) DEVICE — WAX SURG 2.5GM HEMSTAT BNE BEESWAX PARAFFIN ISO PALMITATE

## (undated) DEVICE — SUTURE SZ 0 27IN 5/8 CIR UR-6  TAPER PT VIOLET ABSRB VICRYL J603H

## (undated) DEVICE — 3M™ IOBAN™ 2 ANTIMICROBIAL INCISE DRAPE 6650EZ: Brand: IOBAN™ 2

## (undated) DEVICE — ORTHO EXT PK

## (undated) DEVICE — TUBING SUCT 12FR MAL ALUM SHFT FN CAP VENT UNIV CONN W/ OBT

## (undated) DEVICE — SEALER ENDOSCP L37CM NANO COAT BLNT TIP LAP DIV

## (undated) DEVICE — SCISSOR SURG METZ CRV TIP

## (undated) DEVICE — TROCARS: Brand: KII® BALLOON BLUNT TIP SYSTEM

## (undated) DEVICE — BIT DRL L110MM DIA2.5MM G QUIK CPL W/O STP REUSE

## (undated) DEVICE — GLOVE ORANGE PI 7 1/2   MSG9075

## (undated) DEVICE — BANDAGE,ELASTIC,ESMARK,STERILE,4"X9',LF: Brand: MEDLINE

## (undated) DEVICE — CUFF REPROC TRNQT DPSB W/PLC RED 18IN

## (undated) DEVICE — BIT DRL L110MM DIA3.5MM QUIK CPL W/O STP REUSE

## (undated) DEVICE — PACK LAP BASIC

## (undated) DEVICE — GARMENT,MEDLINE,DVT,INT,CALF,MED, GEN2: Brand: MEDLINE

## (undated) DEVICE — IMMOBILIZER HND L W24.13XL21.59CM ALUM RADPQ ALUMI-HND

## (undated) DEVICE — PENCIL ES L3M BTTN SWCH HOLSTER W/ BLDE ELECTRD EDGE

## (undated) DEVICE — SUTURE VCRL SZ 2-0 L18IN ABSRB UD CT-1 L36MM 1/2 CIR J839D

## (undated) DEVICE — SUTURE VCRL SZ 0 L18IN ABSRB UD L36MM CT-1 1/2 CIR J840D

## (undated) DEVICE — SUTURE MCRYL SZ 4-0 L18IN ABSRB UD L16MM PC-3 3/8 CIR PRIM Y845G

## (undated) DEVICE — TUBING, SUCTION, 9/32" X 20', STRAIGHT: Brand: MEDLINE INDUSTRIES, INC.

## (undated) DEVICE — TOWEL,OR,DSP,ST,NATURAL,DLX,4/PK,20PK/CS: Brand: MEDLINE

## (undated) DEVICE — SUTURE MCRYL SZ 3-0 L27IN ABSRB UD L24MM PS-1 3/8 CIR PRIM Y936H

## (undated) DEVICE — RELOAD STPL L45MM H1.5-3.6MM REG TISS BLU GRIPPING SURF B

## (undated) DEVICE — PADDING,UNDERCAST,COTTON, 3X4YD STERILE: Brand: MEDLINE

## (undated) DEVICE — TROCAR: Brand: KII SHIELDED BLADED ACCESS SYSTEM

## (undated) DEVICE — BLADE CLIPPER GEN PURP NS

## (undated) DEVICE — GLOVE ORANGE PI 8 1/2   MSG9085

## (undated) DEVICE — 1016 S-DRAPE IRRIG POUCH 10/BOX: Brand: STERI-DRAPE™

## (undated) DEVICE — BIT DRL L165MM DIA2.8MM QUIK CPL W/O STP REUSE